# Patient Record
Sex: MALE | Race: WHITE | NOT HISPANIC OR LATINO | Employment: OTHER | URBAN - METROPOLITAN AREA
[De-identification: names, ages, dates, MRNs, and addresses within clinical notes are randomized per-mention and may not be internally consistent; named-entity substitution may affect disease eponyms.]

---

## 2019-04-22 ENCOUNTER — APPOINTMENT (OUTPATIENT)
Dept: CT IMAGING | Facility: CLINIC | Age: 59
DRG: 020 | End: 2019-04-22
Attending: PHYSICIAN ASSISTANT
Payer: COMMERCIAL

## 2019-04-22 ENCOUNTER — APPOINTMENT (OUTPATIENT)
Dept: CT IMAGING | Facility: CLINIC | Age: 59
DRG: 020 | End: 2019-04-22
Attending: EMERGENCY MEDICINE
Payer: COMMERCIAL

## 2019-04-22 ENCOUNTER — HOSPITAL ENCOUNTER (INPATIENT)
Facility: CLINIC | Age: 59
LOS: 7 days | Discharge: NURSING, REHAB OR LONG-TERM CARE | DRG: 020 | End: 2019-04-29
Attending: EMERGENCY MEDICINE | Admitting: INTERNAL MEDICINE
Payer: COMMERCIAL

## 2019-04-22 ENCOUNTER — APPOINTMENT (OUTPATIENT)
Dept: GENERAL RADIOLOGY | Facility: CLINIC | Age: 59
DRG: 020 | End: 2019-04-22
Attending: EMERGENCY MEDICINE
Payer: COMMERCIAL

## 2019-04-22 DIAGNOSIS — G89.29 CHRONIC BILATERAL LOW BACK PAIN WITHOUT SCIATICA: ICD-10-CM

## 2019-04-22 DIAGNOSIS — G40.909 SEIZURE DISORDER (H): ICD-10-CM

## 2019-04-22 DIAGNOSIS — R33.9 URINARY RETENTION: ICD-10-CM

## 2019-04-22 DIAGNOSIS — M54.50 CHRONIC BILATERAL LOW BACK PAIN WITHOUT SCIATICA: ICD-10-CM

## 2019-04-22 DIAGNOSIS — I62.9 INTRACRANIAL HEMORRHAGE (H): Primary | ICD-10-CM

## 2019-04-22 DIAGNOSIS — K59.00 CONSTIPATION, UNSPECIFIED CONSTIPATION TYPE: ICD-10-CM

## 2019-04-22 DIAGNOSIS — I61.9 LEFT-SIDED NONTRAUMATIC INTRACEREBRAL HEMORRHAGE, UNSPECIFIED CEREBRAL LOCATION (H): ICD-10-CM

## 2019-04-22 DIAGNOSIS — R68.2 DRY MOUTH: ICD-10-CM

## 2019-04-22 LAB
ALBUMIN SERPL-MCNC: 4.1 G/DL (ref 3.4–5)
ALBUMIN UR-MCNC: 30 MG/DL
ALP SERPL-CCNC: 101 U/L (ref 40–150)
ALT SERPL W P-5'-P-CCNC: 43 U/L (ref 0–70)
AMPHETAMINES UR QL SCN: NEGATIVE
ANION GAP SERPL CALCULATED.3IONS-SCNC: 24 MMOL/L (ref 3–14)
APPEARANCE UR: CLEAR
AST SERPL W P-5'-P-CCNC: 25 U/L (ref 0–45)
BARBITURATES UR QL: NEGATIVE
BASE DEFICIT BLDA-SCNC: 0 MMOL/L
BASOPHILS # BLD AUTO: 0.1 10E9/L (ref 0–0.2)
BASOPHILS NFR BLD AUTO: 0.6 %
BENZODIAZ UR QL: NEGATIVE
BILIRUB SERPL-MCNC: 0.4 MG/DL (ref 0.2–1.3)
BILIRUB UR QL STRIP: NEGATIVE
BUN SERPL-MCNC: 20 MG/DL (ref 7–30)
BURR CELLS BLD QL SMEAR: SLIGHT
CALCIUM SERPL-MCNC: 9.5 MG/DL (ref 8.5–10.1)
CANNABINOIDS UR QL SCN: NEGATIVE
CHLORIDE SERPL-SCNC: 106 MMOL/L (ref 94–109)
CO2 SERPL-SCNC: 14 MMOL/L (ref 20–32)
COCAINE UR QL: NEGATIVE
COLOR UR AUTO: YELLOW
CREAT SERPL-MCNC: 1.1 MG/DL (ref 0.66–1.25)
DIFFERENTIAL METHOD BLD: ABNORMAL
EOSINOPHIL # BLD AUTO: 0.6 10E9/L (ref 0–0.7)
EOSINOPHIL NFR BLD AUTO: 4.4 %
ERYTHROCYTE [DISTWIDTH] IN BLOOD BY AUTOMATED COUNT: 13.9 % (ref 10–15)
ETHANOL SERPL-MCNC: <0.01 G/DL
GFR SERPL CREATININE-BSD FRML MDRD: 73 ML/MIN/{1.73_M2}
GLUCOSE BLDC GLUCOMTR-MCNC: 142 MG/DL (ref 70–99)
GLUCOSE BLDC GLUCOMTR-MCNC: 151 MG/DL (ref 70–99)
GLUCOSE BLDC GLUCOMTR-MCNC: 95 MG/DL (ref 70–99)
GLUCOSE BLDC GLUCOMTR-MCNC: 98 MG/DL (ref 70–99)
GLUCOSE SERPL-MCNC: 178 MG/DL (ref 70–99)
GLUCOSE UR STRIP-MCNC: NEGATIVE MG/DL
GRAN CASTS #/AREA URNS LPF: 2 /LPF
HCO3 BLD-SCNC: 24 MMOL/L (ref 21–28)
HCT VFR BLD AUTO: 50.2 % (ref 40–53)
HGB BLD-MCNC: 16.8 G/DL (ref 13.3–17.7)
HGB UR QL STRIP: ABNORMAL
IMM GRANULOCYTES # BLD: 0.2 10E9/L (ref 0–0.4)
IMM GRANULOCYTES NFR BLD: 1.6 %
INR PPP: 1.17 (ref 0.86–1.14)
INTERPRETATION ECG - MUSE: NORMAL
KETONES UR STRIP-MCNC: 5 MG/DL
LACTATE BLD-SCNC: 1.6 MMOL/L (ref 0.7–2)
LEUKOCYTE ESTERASE UR QL STRIP: NEGATIVE
LYMPHOCYTES # BLD AUTO: 5.6 10E9/L (ref 0.8–5.3)
LYMPHOCYTES NFR BLD AUTO: 39.7 %
MCH RBC QN AUTO: 30.4 PG (ref 26.5–33)
MCHC RBC AUTO-ENTMCNC: 33.5 G/DL (ref 31.5–36.5)
MCV RBC AUTO: 91 FL (ref 78–100)
MONOCYTES # BLD AUTO: 1 10E9/L (ref 0–1.3)
MONOCYTES NFR BLD AUTO: 6.9 %
MUCOUS THREADS #/AREA URNS LPF: PRESENT /LPF
NEUTROPHILS # BLD AUTO: 6.6 10E9/L (ref 1.6–8.3)
NEUTROPHILS NFR BLD AUTO: 46.8 %
NITRATE UR QL: NEGATIVE
NRBC # BLD AUTO: 0.1 10*3/UL
NRBC BLD AUTO-RTO: 1 /100
O2/TOTAL GAS SETTING VFR VENT: ABNORMAL %
OPIATES UR QL SCN: NEGATIVE
OVALOCYTES BLD QL SMEAR: SLIGHT
PCO2 BLD: 38 MM HG (ref 35–45)
PCP UR QL SCN: NEGATIVE
PH BLD: 7.41 PH (ref 7.35–7.45)
PH UR STRIP: 6 PH (ref 5–7)
PLATELET # BLD AUTO: 191 10E9/L (ref 150–450)
PO2 BLD: 108 MM HG (ref 80–105)
POTASSIUM SERPL-SCNC: 3.8 MMOL/L (ref 3.4–5.3)
PROT SERPL-MCNC: 8.5 G/DL (ref 6.8–8.8)
RBC # BLD AUTO: 5.52 10E12/L (ref 4.4–5.9)
RBC #/AREA URNS AUTO: 78 /HPF (ref 0–2)
SALICYLATES SERPL-MCNC: <2 MG/DL
SODIUM SERPL-SCNC: 144 MMOL/L (ref 133–144)
SOURCE: ABNORMAL
SP GR UR STRIP: 1.02 (ref 1–1.03)
SQUAMOUS #/AREA URNS AUTO: <1 /HPF (ref 0–1)
UROBILINOGEN UR STRIP-MCNC: NORMAL MG/DL (ref 0–2)
WBC # BLD AUTO: 14.1 10E9/L (ref 4–11)
WBC #/AREA URNS AUTO: 1 /HPF (ref 0–5)

## 2019-04-22 PROCEDURE — 25000128 H RX IP 250 OP 636: Performed by: EMERGENCY MEDICINE

## 2019-04-22 PROCEDURE — 82803 BLOOD GASES ANY COMBINATION: CPT | Performed by: INTERNAL MEDICINE

## 2019-04-22 PROCEDURE — 99285 EMERGENCY DEPT VISIT HI MDM: CPT | Mod: 25

## 2019-04-22 PROCEDURE — 40000275 ZZH STATISTIC RCP TIME EA 10 MIN

## 2019-04-22 PROCEDURE — 25000128 H RX IP 250 OP 636

## 2019-04-22 PROCEDURE — 25800030 ZZH RX IP 258 OP 636: Performed by: INTERNAL MEDICINE

## 2019-04-22 PROCEDURE — 80320 DRUG SCREEN QUANTALCOHOLS: CPT | Performed by: EMERGENCY MEDICINE

## 2019-04-22 PROCEDURE — 99221 1ST HOSP IP/OBS SF/LOW 40: CPT | Performed by: PHYSICIAN ASSISTANT

## 2019-04-22 PROCEDURE — 71045 X-RAY EXAM CHEST 1 VIEW: CPT

## 2019-04-22 PROCEDURE — 85610 PROTHROMBIN TIME: CPT | Performed by: EMERGENCY MEDICINE

## 2019-04-22 PROCEDURE — 25000125 ZZHC RX 250: Performed by: INTERNAL MEDICINE

## 2019-04-22 PROCEDURE — 20000003 ZZH R&B ICU

## 2019-04-22 PROCEDURE — 96374 THER/PROPH/DIAG INJ IV PUSH: CPT

## 2019-04-22 PROCEDURE — 36415 COLL VENOUS BLD VENIPUNCTURE: CPT | Performed by: INTERNAL MEDICINE

## 2019-04-22 PROCEDURE — 36600 WITHDRAWAL OF ARTERIAL BLOOD: CPT

## 2019-04-22 PROCEDURE — 83605 ASSAY OF LACTIC ACID: CPT | Performed by: INTERNAL MEDICINE

## 2019-04-22 PROCEDURE — 70450 CT HEAD/BRAIN W/O DYE: CPT | Mod: 77

## 2019-04-22 PROCEDURE — 93005 ELECTROCARDIOGRAM TRACING: CPT

## 2019-04-22 PROCEDURE — 81001 URINALYSIS AUTO W/SCOPE: CPT | Performed by: EMERGENCY MEDICINE

## 2019-04-22 PROCEDURE — 25000125 ZZHC RX 250: Performed by: EMERGENCY MEDICINE

## 2019-04-22 PROCEDURE — 25000128 H RX IP 250 OP 636: Performed by: PHYSICIAN ASSISTANT

## 2019-04-22 PROCEDURE — 96375 TX/PRO/DX INJ NEW DRUG ADDON: CPT

## 2019-04-22 PROCEDURE — 99223 1ST HOSP IP/OBS HIGH 75: CPT | Performed by: PSYCHIATRY & NEUROLOGY

## 2019-04-22 PROCEDURE — 25800030 ZZH RX IP 258 OP 636: Performed by: EMERGENCY MEDICINE

## 2019-04-22 PROCEDURE — 80053 COMPREHEN METABOLIC PANEL: CPT | Performed by: EMERGENCY MEDICINE

## 2019-04-22 PROCEDURE — 80307 DRUG TEST PRSMV CHEM ANLYZR: CPT | Performed by: EMERGENCY MEDICINE

## 2019-04-22 PROCEDURE — 99223 1ST HOSP IP/OBS HIGH 75: CPT | Mod: AI | Performed by: INTERNAL MEDICINE

## 2019-04-22 PROCEDURE — 25000128 H RX IP 250 OP 636: Performed by: INTERNAL MEDICINE

## 2019-04-22 PROCEDURE — 00000146 ZZHCL STATISTIC GLUCOSE BY METER IP

## 2019-04-22 PROCEDURE — 80329 ANALGESICS NON-OPIOID 1 OR 2: CPT | Performed by: INTERNAL MEDICINE

## 2019-04-22 PROCEDURE — 85025 COMPLETE CBC W/AUTO DIFF WBC: CPT | Performed by: EMERGENCY MEDICINE

## 2019-04-22 PROCEDURE — 70498 CT ANGIOGRAPHY NECK: CPT | Mod: XS

## 2019-04-22 PROCEDURE — 70498 CT ANGIOGRAPHY NECK: CPT

## 2019-04-22 PROCEDURE — 70450 CT HEAD/BRAIN W/O DYE: CPT

## 2019-04-22 PROCEDURE — 96372 THER/PROPH/DIAG INJ SC/IM: CPT

## 2019-04-22 PROCEDURE — 25000125 ZZHC RX 250

## 2019-04-22 RX ORDER — NALOXONE HYDROCHLORIDE 0.4 MG/ML
.1-.4 INJECTION, SOLUTION INTRAMUSCULAR; INTRAVENOUS; SUBCUTANEOUS
Status: DISCONTINUED | OUTPATIENT
Start: 2019-04-22 | End: 2019-04-29 | Stop reason: HOSPADM

## 2019-04-22 RX ORDER — OLANZAPINE 10 MG/2ML
10 INJECTION, POWDER, FOR SOLUTION INTRAMUSCULAR DAILY PRN
Status: DISCONTINUED | OUTPATIENT
Start: 2019-04-22 | End: 2019-04-29 | Stop reason: HOSPADM

## 2019-04-22 RX ORDER — SODIUM CHLORIDE, SODIUM LACTATE, POTASSIUM CHLORIDE, CALCIUM CHLORIDE 600; 310; 30; 20 MG/100ML; MG/100ML; MG/100ML; MG/100ML
INJECTION, SOLUTION INTRAVENOUS CONTINUOUS
Status: DISCONTINUED | OUTPATIENT
Start: 2019-04-22 | End: 2019-04-25

## 2019-04-22 RX ORDER — PROCHLORPERAZINE 25 MG
25 SUPPOSITORY, RECTAL RECTAL EVERY 12 HOURS PRN
Status: DISCONTINUED | OUTPATIENT
Start: 2019-04-22 | End: 2019-04-29 | Stop reason: HOSPADM

## 2019-04-22 RX ORDER — AMLODIPINE BESYLATE 5 MG/1
5 TABLET ORAL DAILY
Status: ON HOLD | COMMUNITY
End: 2019-05-04

## 2019-04-22 RX ORDER — METOCLOPRAMIDE 10 MG/1
10 TABLET ORAL EVERY 6 HOURS PRN
Status: DISCONTINUED | OUTPATIENT
Start: 2019-04-22 | End: 2019-04-29 | Stop reason: HOSPADM

## 2019-04-22 RX ORDER — WATER 10 ML/10ML
INJECTION INTRAMUSCULAR; INTRAVENOUS; SUBCUTANEOUS
Status: COMPLETED
Start: 2019-04-22 | End: 2019-04-22

## 2019-04-22 RX ORDER — METFORMIN HCL 500 MG
500-1000 TABLET, EXTENDED RELEASE 24 HR ORAL
COMMUNITY

## 2019-04-22 RX ORDER — SODIUM CHLORIDE 9 MG/ML
1000 INJECTION, SOLUTION INTRAVENOUS CONTINUOUS
Status: DISCONTINUED | OUTPATIENT
Start: 2019-04-22 | End: 2019-04-23

## 2019-04-22 RX ORDER — OLANZAPINE 10 MG/2ML
INJECTION, POWDER, FOR SOLUTION INTRAMUSCULAR
Status: COMPLETED
Start: 2019-04-22 | End: 2019-04-22

## 2019-04-22 RX ORDER — LABETALOL 20 MG/4 ML (5 MG/ML) INTRAVENOUS SYRINGE
10-20 EVERY 10 MIN PRN
Status: DISCONTINUED | OUTPATIENT
Start: 2019-04-22 | End: 2019-04-29 | Stop reason: HOSPADM

## 2019-04-22 RX ORDER — ONDANSETRON 2 MG/ML
4 INJECTION INTRAMUSCULAR; INTRAVENOUS EVERY 6 HOURS PRN
Status: DISCONTINUED | OUTPATIENT
Start: 2019-04-22 | End: 2019-04-29 | Stop reason: HOSPADM

## 2019-04-22 RX ORDER — ONDANSETRON 4 MG/1
4 TABLET, ORALLY DISINTEGRATING ORAL EVERY 6 HOURS PRN
Status: DISCONTINUED | OUTPATIENT
Start: 2019-04-22 | End: 2019-04-29 | Stop reason: HOSPADM

## 2019-04-22 RX ORDER — LEVETIRACETAM 10 MG/ML
1000 INJECTION INTRAVASCULAR ONCE
Status: COMPLETED | OUTPATIENT
Start: 2019-04-22 | End: 2019-04-22

## 2019-04-22 RX ORDER — PROCHLORPERAZINE MALEATE 5 MG
10 TABLET ORAL EVERY 6 HOURS PRN
Status: DISCONTINUED | OUTPATIENT
Start: 2019-04-22 | End: 2019-04-29 | Stop reason: HOSPADM

## 2019-04-22 RX ORDER — METOCLOPRAMIDE HYDROCHLORIDE 5 MG/ML
10 INJECTION INTRAMUSCULAR; INTRAVENOUS EVERY 6 HOURS PRN
Status: DISCONTINUED | OUTPATIENT
Start: 2019-04-22 | End: 2019-04-29 | Stop reason: HOSPADM

## 2019-04-22 RX ORDER — MORPHINE SULFATE 4 MG/ML
INJECTION, SOLUTION INTRAMUSCULAR; INTRAVENOUS
Status: COMPLETED
Start: 2019-04-22 | End: 2019-04-22

## 2019-04-22 RX ORDER — HYDRALAZINE HYDROCHLORIDE 20 MG/ML
10-20 INJECTION INTRAMUSCULAR; INTRAVENOUS
Status: DISCONTINUED | OUTPATIENT
Start: 2019-04-22 | End: 2019-04-23

## 2019-04-22 RX ORDER — IOPAMIDOL 755 MG/ML
70 INJECTION, SOLUTION INTRAVASCULAR ONCE
Status: COMPLETED | OUTPATIENT
Start: 2019-04-22 | End: 2019-04-22

## 2019-04-22 RX ORDER — HYDROMORPHONE HYDROCHLORIDE 1 MG/ML
0.2 INJECTION, SOLUTION INTRAMUSCULAR; INTRAVENOUS; SUBCUTANEOUS
Status: DISCONTINUED | OUTPATIENT
Start: 2019-04-22 | End: 2019-04-23

## 2019-04-22 RX ORDER — LEVETIRACETAM 5 MG/ML
500 INJECTION INTRAVASCULAR EVERY 12 HOURS
Status: DISCONTINUED | OUTPATIENT
Start: 2019-04-22 | End: 2019-04-29

## 2019-04-22 RX ORDER — IOPAMIDOL 755 MG/ML
70 INJECTION, SOLUTION INTRAVASCULAR ONCE
Status: DISCONTINUED | OUTPATIENT
Start: 2019-04-22 | End: 2019-04-22

## 2019-04-22 RX ADMIN — WATER: 1 INJECTION INTRAMUSCULAR; INTRAVENOUS; SUBCUTANEOUS at 10:55

## 2019-04-22 RX ADMIN — Medication 0.2 MG: at 17:24

## 2019-04-22 RX ADMIN — MIDAZOLAM HYDROCHLORIDE 2 MG: 1 INJECTION, SOLUTION INTRAMUSCULAR; INTRAVENOUS at 11:09

## 2019-04-22 RX ADMIN — DEXMEDETOMIDINE 0.2 MCG/KG/HR: 100 INJECTION, SOLUTION, CONCENTRATE INTRAVENOUS at 12:02

## 2019-04-22 RX ADMIN — SODIUM CHLORIDE 1000 ML: 9 INJECTION, SOLUTION INTRAVENOUS at 11:37

## 2019-04-22 RX ADMIN — LEVETIRACETAM 500 MG: 5 INJECTION INTRAVENOUS at 21:21

## 2019-04-22 RX ADMIN — Medication 0.2 MG: at 13:50

## 2019-04-22 RX ADMIN — IOPAMIDOL 70 ML: 755 INJECTION, SOLUTION INTRAVENOUS at 18:16

## 2019-04-22 RX ADMIN — MIDAZOLAM HYDROCHLORIDE 2 MG: 1 INJECTION, SOLUTION INTRAMUSCULAR; INTRAVENOUS at 10:49

## 2019-04-22 RX ADMIN — SODIUM CHLORIDE 100 ML: 9 INJECTION, SOLUTION INTRAVENOUS at 18:16

## 2019-04-22 RX ADMIN — Medication 0.2 MG: at 20:20

## 2019-04-22 RX ADMIN — DEXMEDETOMIDINE 0.2 MCG/KG/HR: 100 INJECTION, SOLUTION, CONCENTRATE INTRAVENOUS at 16:35

## 2019-04-22 RX ADMIN — OLANZAPINE 10 MG: 10 INJECTION, POWDER, FOR SOLUTION INTRAMUSCULAR at 10:55

## 2019-04-22 RX ADMIN — LEVETIRACETAM 1000 MG: 10 INJECTION INTRAVENOUS at 12:01

## 2019-04-22 RX ADMIN — MORPHINE SULFATE 4 MG: 4 INJECTION INTRAVENOUS at 12:12

## 2019-04-22 RX ADMIN — SODIUM CHLORIDE, POTASSIUM CHLORIDE, SODIUM LACTATE AND CALCIUM CHLORIDE: 600; 310; 30; 20 INJECTION, SOLUTION INTRAVENOUS at 13:51

## 2019-04-22 RX ADMIN — Medication 0.2 MG: at 15:47

## 2019-04-22 ASSESSMENT — ENCOUNTER SYMPTOMS
CONFUSION: 1
HEADACHES: 0
AGITATION: 1
SHORTNESS OF BREATH: 0
ABDOMINAL PAIN: 0

## 2019-04-22 ASSESSMENT — ACTIVITIES OF DAILY LIVING (ADL)
RETIRED_EATING: 0-->INDEPENDENT
COGNITION: 0 - NO COGNITION ISSUES REPORTED
TRANSFERRING: 0-->INDEPENDENT
FALL_HISTORY_WITHIN_LAST_SIX_MONTHS: NO
BATHING: 0-->INDEPENDENT
AMBULATION: 0-->INDEPENDENT
ADLS_ACUITY_SCORE: 12
ADLS_ACUITY_SCORE: 12
SWALLOWING: 0-->SWALLOWS FOODS/LIQUIDS WITHOUT DIFFICULTY
DRESS: 0-->INDEPENDENT
RETIRED_COMMUNICATION: 0-->UNDERSTANDS/COMMUNICATES WITHOUT DIFFICULTY
TOILETING: 0-->INDEPENDENT

## 2019-04-22 NOTE — H&P
[facility]    History and Physical - Hospitalist Service       Date of Admission:  4/22/2019    Assessment & Plan   Apolinar Mcgregor is a 58 year old male with a history of HTN who presented to the ED with altered mental status and was found to have an acute parenchymal hemorrhage     Acute parenchymal hemorrhage  AMS due to above  Patient initially presented with AMS.  Found to have an acute parenchymal hemorrhage in the left parietal and occipital lobs measuring up to 4.7 cm with 4 mm rightward midline shift.  Wife does not know of any traumatic head injuries.  Does have a history of HTN but blood pressure well controlled on presentation.  UDS negative.  ED did speak with neurosurgery who recommended admission to ICU and continued monitoring  - Admission to ICU w/q1h neuro-checks  - Goal SBP <140 with PRN Labetalol and Hydralazine  - Repeat CT head  - NPO currently  - PT/OT/Speech consulted   - Neurosurgery and neurology consulted.  Appreciate their assistance.  No plans for surgical intervention at this time     Anion gap metabolic acidosis   Bicarb 14 and anion gap 24.  Unclear etiology at this time.  Given the bleed it is possible the patient had a seizure and this could have elevated his lactate.   - Lactic acid ordered  - ASA level   - Repeat BMP in AM     H/o HTN   Blood pressures well controlled at this time without intervention  - Blood pressure control as above        Diet: NPO for Medical/Clinical Reasons Except for: Other; Specify: Until Dysphagia/Stroke Swallow Screen Completed (except Status Epileptics patients)    DVT Prophylaxis: Pneumatic Compression Devices  Hinson Catheter: in place, indication:    Code Status: Full Code      Disposition Plan   Expected discharge: 2-5 days, recommended to transitional care unit once work up complete.  Entered: Bijan Hernandez DO 04/22/2019, 12:58 PM     The patient's care was discussed with the Patient, Patient's Family and ED physician  .    Bijan Hernandez,   [facility]    ______________________________________________________________________    Chief Complaint   Altered mental status    History is limited due to his altered mental status.  History was obtained from the patient's friend and wife     History of Present Illness   Apolinar Mcgregor is a 58 year old male with a history of HTN who presented to the ED with AMS. The patient's friend reports that this morning the patient called him and he felt he sounded confused so he went to  the patient to bring him to an urgent care.  By the time he arrived at the patient's house he felt as if the patient's confusion had worsened and he was now getting agitated so he brought him in to the ED instead.  On presentation the patient was given Versed x2 and placed in 5 point restraints and has since calmed down.  Patient is intermittently stating his back hurts but otherwise not answering questions appropriately or following commands.  Wife states that yesterday the patient was his normal self and had no complaints.  She also does not know of any injuries to his head.      In the ED the patient was seen by Dr. Covarrubias.  CT head showed the parenchymal hemorrhage.  Neurosurgery was consulted who did not feel surgical intervention was warranted at this time.  He was given Versed x2 and placed in 5 point restraints for agitation.  Admitted to ICU for further evaluation     Review of Systems    Unable to obtain due to the patient's altered mental status     Past Medical History    I have reviewed this patient's medical history and updated it with pertinent information if needed.   Past Medical History:   Diagnosis Date     Gastro-oesophageal reflux disease      Hypertension      Urinary retention        Past Surgical History   I have reviewed this patient's surgical history and updated it with pertinent information if needed.  Past Surgical History:   Procedure Laterality Date      CHOLECYSTECTOMY  2000     URETHROPLASTY WITH BUCCAL GRAFT  6/12/2013    Procedure: URETHROPLASTY WITH BUCCAL GRAFT;  Anterior Urethroplasty with Buccal Graft ;  Surgeon: Dmitry Moore MD;  Location: [facility] OR       Social History   I have reviewed this patient's social history and updated it with pertinent information if needed.  Social History     Tobacco Use     Smoking status: Former Smoker     Smokeless tobacco: Never Used   Substance Use Topics     Alcohol use: Yes     Comment: occasional     Drug use: No       Family History   I have reviewed this patient's family history and updated it with pertinent information if needed.   Mother- Lung cancer     Prior to Admission Medications   Prior to Admission Medications   Prescriptions Last Dose Informant Patient Reported? Taking?   Omeprazole (PRILOSEC PO) 4/21/2019 at Unknown time Self Yes Yes   Sig: Take 20 mg by mouth every morning.   amLODIPine (NORVASC) 5 MG tablet 4/21/2019 at Unknown time Pharmacy Yes Yes   Sig: Take 5 mg by mouth daily   metFORMIN (GLUCOPHAGE-XR) 500 MG 24 hr tablet 4/21/2019 at Unknown time Pharmacy Yes Yes   Sig: Take 500-1,000 mg by mouth daily (with breakfast)      Facility-Administered Medications: None     Allergies   No Known Allergies    Physical Exam   Vital Signs: Temp: 98.4  F (36.9  C) Temp src: Axillary BP: 103/69 Pulse: 101 Heart Rate: 96 Resp: 23 SpO2: 96 % O2 Device: Nasal cannula Oxygen Delivery: 3 LPM  Weight: 211 lbs 13.79 oz    General Appearance: Resting in bed.  In 5 point restraints  Eyes: Eyes shut but will occasionally look in the direction of speaker.  Normal conjunctiva  HEENT: NC/AT  Respiratory: Clear to auscultation.  No respiratory distress  Cardiovascular: Tachycardia.  No murmurs  GI: Bowel sounds present.  Non-distended  Skin: No obvious rashes  Musculoskeletal: No edema  Neurologic: Moving all extremities grossly but unable to follow commands  Psychiatric: Pleasant and alert    Data   Data reviewed  today: I reviewed all medications, new labs and imaging results over the last 24 hours. I personally reviewed   CT Head:  Left parenchymal hemorrhage seen     Recent Labs   Lab 04/22/19  1050   WBC 14.1*   HGB 16.8   MCV 91      INR 1.17*      POTASSIUM 3.8   CHLORIDE 106   CO2 14*   BUN 20   CR 1.10   ANIONGAP 24*   ALAN 9.5   *   ALBUMIN 4.1   PROTTOTAL 8.5   BILITOTAL 0.4   ALKPHOS 101   ALT 43   AST 25     Recent Results (from the past 24 hour(s))   XR Chest 1 View    Narrative    CHEST ONE VIEW SEMI-UPRIGHT 4/22/2019 11:25 AM     HISTORY: Altered level of consciousness.    COMPARISON: March 10, 2013      Impression    IMPRESSION: Low lung volumes. No definite acute infiltrates.    ALBERT MON MD   CT Head w/o Contrast    Narrative    CT SCAN OF THE HEAD WITHOUT CONTRAST   4/22/2019 11:39 AM     HISTORY: Acute confusion. Unresponsive.    TECHNIQUE:  Axial images of the head and coronal reformations without  IV contrast material. Radiation dose for this scan was reduced using  automated exposure control, adjustment of the mA and/or kV according  to patient size, or iterative reconstruction technique.    COMPARISON: None.    FINDINGS: There is an acute hyperdense parenchymal hemorrhage in the  left parietal and occipital lobes measuring 3.9 x 1.9 x 4.7 cm.  Moderate surrounding hypodensity likely representing vasogenic edema.  There is mild mass effect on the posterior horn of the left lateral  ventricle without significant dilatation of the ventricles or evidence  of hydrocephalus. There is 4 mm of rightward midline shift.    Mild scattered mucosal thickening in the paranasal sinuses. The bony  calvarium and bones of the skull base appear intact.       Impression    IMPRESSION:    1. Acute parenchymal hemorrhage in the left parietal and occipital  lobes measuring up to 4.7 cm as detailed above. Moderate surrounding  hypodense edema with mass effect on the posterior horn of the left  lateral  ventricle. No evidence of hydrocephalus.   2. 4 mm rightward midline shift.    Results discussed with Tiago Covarrubias at 11:44 AM on 4/22/2019.    GIA STRATTON MD

## 2019-04-22 NOTE — CONSULTS
"[facility]      Stroke Consult Note    Reason for Consult:  Non-emergent consult request for \"Intracranial hemorrhage\"    HPI  Apolinar Mcgregor is a 58 year old man with a history of hypertension who was in his usual state of health until this morning when a friend called him and found him to be altered, then went to his house and found him very altered, drove him to urgent care but he deteriorated and was \"flailing around\" in the car so went to the ED instead. On arrival to the ED he was still extremely agitated and restrained and given versed.  On my exam in the patient's ICU room he complained of no headache and agitation was improving a little per his family. He did complain of back pain. He has no prior neurologic history and is no no blood thinners. BP on arrival to the ED was only in the 120s.    Impression  Intracerebral Hemorrhage:   L parietal and occipital intraparenchymal hemorrhage, ICH score 0, 17cc, bleed day #1  Cerebral edema  Seizure on presentation - has R tongue bite  Agitation due to postictal state  Back pain due to seizure  Anion gap metabolic acidosis - due to seizure    Recommendations  Acute Hemorrhagic Stroke Recommendations  - Neurochecks q1 hr  - Recheck head CT 6 hrs from first scan (will be 1715 tonight). Add CTA/CTV head at that time if calm.  - Head CT also in the morning  - Agree with checking lactate level, expect will be elevated  - Seizure prophylaxis with Keppra 500mg q12 hours for at least a month. Can switch to po when taking po consistently.  - Systolic BP Goal: < 140  - Maintain normoglycemia, normothermia, normonatremia  - Head of bed elevated at least 30 degrees  - PT/OT/SLP  - Stroke Education    Patient Follow-up    - final recommendation pending work-up      Please contact the Stroke Service with any questions. Will follow.    Shayna Koch PA-C  Neurology  04/22/2019 1:07 PM  Pager: [phone]    Text Page " ([phone])  __________________________________________________    Past Medical History:   Diagnosis Date     Gastro-oesophageal reflux disease      Hypertension      Urinary retention        Past Surgical History:   Procedure Laterality Date     CHOLECYSTECTOMY  2000     URETHROPLASTY WITH BUCCAL GRAFT  6/12/2013    Procedure: URETHROPLASTY WITH BUCCAL GRAFT;  Anterior Urethroplasty with Buccal Graft ;  Surgeon: Dmitry Moore MD;  Location: [facility] OR       Medications   Home Meds  Medication Sig   amLODIPine (NORVASC) 5 MG tablet Take 5 mg by mouth daily   metFORMIN (GLUCOPHAGE-XR) 500 MG 24 hr tablet Take 500-1,000 mg by mouth daily (with breakfast)   Omeprazole (PRILOSEC PO) Take 20 mg by mouth every morning.       Scheduled meds    iopamidol  70 mL Intravenous Once     levETIRAcetam  500 mg Intravenous Q12H     sodium chloride 0.9 %  90 mL Intravenous Once       Infusion meds    dexmedetomidine Stopped (04/22/19 1213)     - MEDICATION INSTRUCTIONS -       sodium chloride         PRN meds  sodium chloride 0.9%, hydrALAZINE, labetalol, - MEDICATION INSTRUCTIONS -, metoclopramide **OR** metoclopramide, naloxone, OLANZapine, ondansetron **OR** ondansetron, prochlorperazine **OR** prochlorperazine **OR** prochlorperazine      Allergies   No Known Allergies    Family History       Social History      Social History     Tobacco Use     Smoking status: Former Smoker     Smokeless tobacco: Never Used   Substance Use Topics     Alcohol use: Yes     Comment: occasional     Drug use: No         ROS  Review of systems not obtained due to patient factors - lack of cooperation    PHYSICAL EXAMINATION  Temp:  [98.4  F (36.9  C)] 98.4  F (36.9  C)  Pulse:  [] 89  Heart Rate:  [] 90  Resp:  [14-30] 14  BP: ()/() 128/82  SpO2:  [94 %-99 %] 94 %    General: Lying in bed in NAD, gets very restless when stimulated  Heent: Has R side tongue bite consistent with seizure    Neurologic  Mental Status:  follows 1  step commands, does not answer orientation questions  Cranial Nerves:  PERRL, facial movements symmetric, hearing not formally tested but intact to conversation, shoulder shrug strong bilaterally  Motor:  participates very poorly, may have a mild R arm hemiparesis, left seems strong and legs seem strong  Reflexes:  deferred  Sensory:  unable to test as he does not participate  Coordination:  unable to test as he does not participate  Station/Gait:  deferred due to acute illness    Stroke Scales      ICH Score (at admission)  Scoring Tool Score   Age ? 80 years 1 point No   GCS score  3-4   5-12   13-15   2 point  1 point  0 point GCS 13-15   Hematoma volume, cm 3 ? 30 1 point No   Intraventricular extension 1 point No   Infratentorial location 1 point No   Total 0         Labs/Imaging  Labs and imaging were reviewed and used in developing plan; pertinent results included.  Data   CBC  WBC (10e9/L)   Date Value   04/22/2019 14.1 (H)   04/30/2013 6.6   03/10/2013 7.9    RBC Count (10e12/L)   Date Value   04/22/2019 5.52   04/30/2013 5.28   03/10/2013 5.62    Hemoglobin (g/dL)   Date Value   04/22/2019 16.8   04/30/2013 16.8   03/10/2013 17.8 (H)      Hematocrit (%)   Date Value   04/22/2019 50.2   04/30/2013 47.3   03/10/2013 50.2    Platelet Count (10e9/L)   Date Value   04/22/2019 191   04/30/2013 169   03/10/2013 168         BMP  Sodium (mmol/L)   Date Value   04/22/2019 144   04/30/2013 143   03/10/2013 141    Potassium (mmol/L)   Date Value   04/22/2019 3.8   06/12/2013 4.1   04/30/2013 4.0    Chloride (mmol/L)   Date Value   04/22/2019 106   04/30/2013 104   03/10/2013 104      Carbon Dioxide (mmol/L)   Date Value   04/22/2019 14 (L)   04/30/2013 24   03/10/2013 25    Glucose (mg/dL)   Date Value   04/22/2019 178 (H)   04/30/2013 103 (H)   03/10/2013 109 (H)    Urea Nitrogen (mg/dL)   Date Value   04/22/2019 20   04/30/2013 15   03/10/2013 17      Creatinine (mg/dL)   Date Value   04/22/2019 1.10   04/30/2013  0.89   03/10/2013 0.96    Calcium (mg/dL)   Date Value   04/22/2019 9.5   04/30/2013 9.4   03/10/2013 9.9         INR Troponin I A1C   INR (no units)   Date Value   04/22/2019 1.17 (H)   04/30/2013 1.01    Troponin I ES (ug/L)   Date Value   03/11/2013 <0.012   03/10/2013 <0.012   03/10/2013 <0.012    No results found for: A1C     Liver Panel  Protein Total (g/dL)   Date Value   04/22/2019 8.5   05/11/2008 7.7    Albumin (g/dL)   Date Value   04/22/2019 4.1   05/11/2008 4.5    Bilirubin Total (mg/dL)   Date Value   04/22/2019 0.4   05/11/2008 0.5      Alkaline Phosphatase (U/L)   Date Value   04/22/2019 101   05/11/2008 79    AST (U/L)   Date Value   04/22/2019 25   05/11/2008 40    ALT (U/L)   Date Value   04/22/2019 43   05/11/2008 60      No results found for: BILIDIRECT       Lipid Profile  No results found for: CHOL No results found for: HDL No results found for: LDL   No results found for: TRIG No results found for: CHOLHDLRATIO           I have personally spent a total of 50 minutes providing care and consulting with this patient's medical providers today, with more than 50% of this time spent in consultation, coordination of care, and discussion with the patient and/or family regarding diagnostic results, prognosis, symptom management, risks and benefits of management options, and development of plan of care.     This was a non-emergent, non-tele stroke consult.

## 2019-04-22 NOTE — CONSULTS
[facility]    Neurosurgery Consultation     Date of Admission:  4/22/2019  Date of Consult (When I saw the patient): 04/22/19    Assessment & Plan   Apolinar Mcgregor is a 58 year old male who was admitted on 4/22/2019. I was asked to see the patient for acute parenchymal hemorrhage.    Active Problems:  Parenchymal hemorrhage  Assessment: acute parenchymal hemorrhage in left parietal and occipital lobes measuring 4.7cm with moderate surrounding edema and mass effect with 4mm midline shift. Initially unable to obtain CTA due to patient agitation, but much calmer now.  Plan:  - CTA  -  Keep SBP < 160  -  Elevate HOB 30-60 degrees  -  Discontinue all blood thinners and anti-inflammatories      I have discussed the following assessment and plan with Dr. Aashish Banegas who is in agreement with initial plan and will follow up with further consultation recommendations.    Stephany Islas PA-C  [facility]  [address]  [address] [place] [id number]    Tel [phone]  Pager [phone]        Code Status    Prior    Reason for Consult   Reason for consult: I was asked by Dr. Covarrubias to evaluate this patient for parenchymal hemorrhage.    Primary Care Physician   Luis Fernando Rodriguez    Chief Complaint   AMS    History is obtained from the patient, electronic health record, emergency department physician and patient's family    History of Present Illness   Apolinar Mcgregor is a 58 year old male who presents with AMS. Was contacted by friend this AM to ask him out for drinks later, but patient did not answer his phone. He called back 30 minutes later and patient sounded odd, so he presented to house and found patient thrashing and yelling not making sense. Was brought to ED and found to have left parenchymal hemorrhage. Per family at bedside patient was feeling well yesterday and does not remember him having any falls or trauma. Was initially agitated and restless on  arrival, but is much calmer on exam at current. Unable to get CTA earlier, but will need to obtain CTA at this time. Patient is aware he is in hospital ([facility]), current year, and his . Denies headache.    Past Medical History   I have reviewed this patient's medical history and updated it with pertinent information if needed.   Past Medical History:   Diagnosis Date     Gastro-oesophageal reflux disease      Hypertension      Urinary retention        Past Surgical History   I have reviewed this patient's surgical history and updated it with pertinent information if needed.  Past Surgical History:   Procedure Laterality Date     CHOLECYSTECTOMY       URETHROPLASTY WITH BUCCAL GRAFT  2013    Procedure: URETHROPLASTY WITH BUCCAL GRAFT;  Anterior Urethroplasty with Buccal Graft ;  Surgeon: Dmitry Moore MD;  Location: [facility] OR       Prior to Admission Medications   Prior to Admission Medications   Prescriptions Last Dose Informant Patient Reported? Taking?   Omeprazole (PRILOSEC PO)  Self Yes No   Sig: Take 20 mg by mouth every morning.   UNKNOWN TO PATIENT   Yes No   Sig: Blood pressure medication      Facility-Administered Medications: None     Allergies   No Known Allergies    Social History   I have reviewed this patient's social history and updated it with pertinent information if needed. Apolinar HECTOR Mcgregor  reports that he has quit smoking. He has never used smokeless tobacco. He reports that he drinks alcohol. He reports that he does not use drugs.    Family History   I have reviewed this patient's family history and updated it with pertinent information if needed.   No family history on file.    Review of Systems   CONSTITUTIONAL: NEGATIVE for fever, chills, change in weight  INTEGUMENTARY/SKIN: NEGATIVE for worrisome rashes, moles or lesions  EYES: NEGATIVE for vision changes or irritation  ENT/MOUTH: NEGATIVE for ear, mouth and throat problems  RESP: NEGATIVE for  significant cough or SOB  BREAST: NEGATIVE for masses, tenderness or discharge  CV: NEGATIVE for chest pain, palpitations or peripheral edema  GI: NEGATIVE for nausea, abdominal pain, heartburn, or change in bowel habits  : NEGATIVE for frequency, dysuria, or hematuria  MUSCULOSKELETAL: NEGATIVE for significant arthralgias or myalgia  NEURO: NEGATIVE for weakness, dizziness or paresthesias  ENDOCRINE: NEGATIVE for temperature intolerance, skin/hair changes  HEME: NEGATIVE for bleeding problems  PSYCHIATRIC: NEGATIVE for changes in mood or affect    Physical Exam   Temp: 98.4  F (36.9  C) Temp src: Oral BP: 118/70 Pulse: 101 Heart Rate: 103 Resp: 25 SpO2: 96 % O2 Device: Nasal cannula Oxygen Delivery: 3 LPM  Vital Signs with Ranges  Temp:  [98.4  F (36.9  C)] 98.4  F (36.9  C)  Pulse:  [101-140] 101  Heart Rate:  [101-138] 103  Resp:  [18-30] 25  BP: ()/() 118/70  SpO2:  [96 %-99 %] 96 %  212 lbs 0 oz    Heart Rate: 103, Blood pressure 118/70, pulse 101, temperature 98.4  F (36.9  C), temperature source Oral, resp. rate 25, weight 212 lb (96.2 kg), SpO2 96 %.  212 lbs 0 oz  HEENT:  Normocephalic, atraumatic.  PERRL.  EOM s intact.   Neck:  Supple, non-tender, without lymphadenopathy.  Heart:  No peripheral edema  Lungs:  No SOB  Abdomen:  Soft, non-tender, non-distended.    Skin:  Warm and dry.  Extremities:  Good radial and dorsalis pedis pulses bilaterally, no edema, cyanosis or clubbing.    NEUROLOGICAL EXAMINATION:   Mental status:  Alert and Oriented x 3, speech is fluent. Some intermittent confusion.  Cranial nerves:  II-XII intact.   Motor: Moves all extremities equally, in restraints  Sensation:  intact  Reflexes:   Negative Babinski.  Negative Clonus.    Gait:  Deferred      Data   CBC RESULTS:   Recent Labs   Lab Test 04/22/19  1050   WBC 14.1*   RBC 5.52   HGB 16.8   HCT 50.2   MCV 91   MCH 30.4   MCHC 33.5   RDW 13.9        Basic Metabolic Panel:  Lab Results   Component Value Date      04/22/2019      Lab Results   Component Value Date    POTASSIUM 3.8 04/22/2019     Lab Results   Component Value Date    CHLORIDE 106 04/22/2019     Lab Results   Component Value Date    ALAN 9.5 04/22/2019     Lab Results   Component Value Date    CO2 14 04/22/2019     Lab Results   Component Value Date    BUN 20 04/22/2019     Lab Results   Component Value Date    CR 1.10 04/22/2019     Lab Results   Component Value Date     04/22/2019     INR:  Lab Results   Component Value Date    INR 1.17 04/22/2019    INR 1.01 04/30/2013

## 2019-04-22 NOTE — PHARMACY-ADMISSION MEDICATION HISTORY
Admission medication history interview status for the 4/22/2019  admission is complete. See EPIC admission navigator for prior to admission medications     Medication history source reliability:Good    Actions taken by pharmacist (provider contacted, etc):Called [facility] for doses of meds because pt and wife didn't know.     Additional medication history information not noted on PTA med list :None    Medication reconciliation/reorder completed by provider prior to medication history? No    Time spent in this activity: 10 min    Prior to Admission medications    Medication Sig Last Dose Taking? Auth Provider   amLODIPine (NORVASC) 5 MG tablet Take 5 mg by mouth daily 4/21/2019 at Unknown time Yes Unknown, Entered By History   metFORMIN (GLUCOPHAGE-XR) 500 MG 24 hr tablet Take 500-1,000 mg by mouth daily (with breakfast) 4/21/2019 at Unknown time Yes Unknown, Entered By History   Omeprazole (PRILOSEC PO) Take 20 mg by mouth every morning. 4/21/2019 at Unknown time Yes Reported, Patient

## 2019-04-22 NOTE — PLAN OF CARE
SLP: Orders received and chart reviewed. Patient admitted with a left parietal and occipital bleed. Per his nurse hold off on evaluation today. Will reschedule for 4/23/19.

## 2019-04-22 NOTE — ED PROVIDER NOTES
"  History     Chief Complaint:  Altered mental status     HPI   Apolinar Mcgregor is a 58 year old male with a history of diabetes mellitus, hypertension, and gastro esopageal reflux disease who presents with altered mental status. The patient was on the phone with a friend to schedule plans to meet up this evening when he mentioned not feeling right and sounding confused. The patient's friend visited him and found him confused and screaming nonsensical statements. He called 911. Due to concern, the patient has been taken to the ED for evaluation and treatment via ambulance. Upon arrival, the patient denies any headache, chest pain, shortness of breath, or abdominal pain. The patient is not a heavy drinker according to his friend.     Allergies:  The patient has no known drug allergies.    Medications:    Omeprazole     Past Medical History:    Gastro esopageal reflux disease  Hypertension  Urinary retention  Urethral stricture     Past Surgical History:    cholecystectomy   urethroplasty with buccal graft     Family History:    No past pertinent family history.    Social History:  Marital Status:     Smoker:   Former   Smokeless:   Never   Alcohol:   Yes   Drugs:   No   Arrives with:   Friend      Review of Systems   Unable to perform ROS: Mental status change   Respiratory: Negative for shortness of breath.    Cardiovascular: Negative for chest pain.   Gastrointestinal: Negative for abdominal pain.   Neurological: Negative for headaches.   Psychiatric/Behavioral: Positive for agitation and confusion.     Physical Exam     Patient Vitals for the past 24 hrs:   BP Temp Temp src Pulse Heart Rate Resp SpO2 Height Weight   04/22/19 1500 123/81 -- -- 79 80 18 97 % -- --   04/22/19 1430 99/65 -- -- 84 85 18 97 % -- --   04/22/19 1400 116/68 -- -- 93 99 17 100 % -- --   04/22/19 1330 122/81 -- -- 87 82 14 93 % -- --   04/22/19 1300 128/82 -- -- 89 90 14 94 % 1.778 m (5' 10\") --   04/22/19 1245 103/69 98.4  F (36.9  C) " Axillary -- 96 23 96 % -- 96.1 kg (211 lb 13.8 oz)   04/22/19 1210 118/70 -- -- 101 103 25 96 % -- --   04/22/19 1200 126/72 -- -- 104 101 22 97 % -- --   04/22/19 1150 108/65 -- -- 105 106 24 96 % -- --   04/22/19 1140 101/68 -- -- 109 107 21 99 % -- 96.2 kg (212 lb)   04/22/19 1137 -- 98.4  F (36.9  C) Oral -- -- -- -- -- --   04/22/19 1135 108/69 -- -- 121 116 18 99 % -- --   04/22/19 1105 99/61 -- -- 125 125 30 -- -- --   04/22/19 1100 103/67 -- -- 129 130 21 97 % -- --   04/22/19 1055 117/71 -- -- 134 135 21 -- -- --   04/22/19 1050 (!) 120/111 -- -- 140 138 26 -- -- --     Physical Exam  Constitutional: Middle aged white male thrashing about. Intermittently yelling out.  HENT: No signs of trauma.   Eyes: Unable to cooperate with EOM. Pupils are equal, round, and reactive to light.   Neck: Normal range of motion. No JVD present. No cervical adenopathy.  Cardiovascular: Regular rhythm.  Exam reveals no gallop and no friction rub.    No murmur heard.  Pulmonary/Chest: Bilateral breath sounds normal. No wheezes, rhonchi or rales.  Abdominal: Soft. No tenderness. No rebound or guarding.   Musculoskeletal: No edema. No tenderness.   Lymphadenopathy: No lymphadenopathy.   Neurological: Awake, alert, able to state his name. Normal strength. Coordination normal. Does not answer questions about time or place. No facial asymmetry. Moves all extremities well.  Skin: Skin is warm and dry. No rash noted. No erythema.   Psych: Yelling out, confused, agitated. No overt psychosis.     Emergency Department Course   ECG:  Indication: altered mental status   Time: 1054  Vent. Rate 136 bpm. VA interval 134. QRS duration 94. QT/QTc 306/460. P-R-T axis 45 -33 40. Sinus tachycardia. Left axis deviation. Voltage criteria for left ventricular hypertrophy. Read time: 1055    Imaging:  Radiographic findings were communicated with the patient and family who voiced understanding of the findings.    XR Chest 1 view:   Low lung volumes. No  definite acute infiltrates. as per radiology.     CT Head without contrast:   1. Acute parenchymal hemorrhage in the left parietal and occipital  lobes measuring up to 4.7 cm as detailed above. Moderate surrounding  hypodense edema with mass effect on the posterior horn of the left  lateral ventricle. No evidence of hydrocephalus.   2. 4 mm rightward midline shift. as per radiology.    Laboratory:  INR: 1.17  Glucose by meter: 151  CMP: Glucose 178, carbon dioxide 14, anion gap 24, o/w WNL (Creatinine: 1.10)  Alcohol ethyl: <0.01  CBC: WBC: 14.1, HGB: 16.8, PLT: 191  UA with Microscopic: ketones 5, blood moderate, albumin 30, RBC 78, mucous present, granular casts 2, o/w WNL  Drug abuse screen: negative     Interventions:  1055: Zyprexa 10 mg IM  1109: Versed 2 mg IV  1137: NS 1L IV Bolus  1201: Keppra 1000 mg IV infusion  1212: Morphine 4 mg IV  1212: Versed 2 mg IV    Emergency Department Course:  (1040) I performed an exam of the patient as documented above.   (1042)  The patient began yelling and fighting against staff.  (1043)  The patient held down by multiple staff.  (1044)  The patient restrained.  (1044)  IV attempted in the right arm. Unsuccessful.   (1046)  Zyprexa given IM  (1048)  The patient's lungs auscultated.   (1049)  IV attempted in left arm. Successful.   (1130)  Head bleed recognized.  (1131)  Visited CT to observe scan.  (1141) I consulted with radiology.  (1144) I consulted with Cristobal Islas, neurosurgery PA, regarding the patient's history and presentation here in the emergency department.  (1151)  I consulted with Dr. Hernandez of the hospitalist services. They are in agreement to accept the patient for admission.  (1154) I consulted with Dr. Tavarez, , regarding the patient's history and presentation here in the emergency department.  (1158) I rechecked the patient and discussed the results of their workup thus far.   Findings and plan explained to the Patient and spouse and friend who consents  to admission. Discussed the patient with Dr. Hernandez, who will admit the patient to a ICU bed for further monitoring, evaluation, and treatment.    Impression & Plan    Medical Decision Making:  Apolinar Mcgregor is a 58 years old male that presents to the ED with his friend. The patient's friend called him at about 0930 this morning to try to set up a time to go out tonight. The patient stated he was feeling confused and not right. His friend came and noticed that he was very agitated and brought him to the ED. The patient's wife had gone to work an hour earlier. She states he had been sleeping when she left and did not notice anything different either this morning or last night. There is no reported trauma. The patient on arrival here was very agitated. He had to be sedated and restrained. I ordered a CT which did show a left occipital bleed in his cerebral. Labs were obtained without remarkable findings with IV Versed, IM Zyprexa, and Precedex drip available, the patient is more stable. We did attempt originally to get a CT but he became more agitated when he was moved around. I discussed the patient with neurosurgery, neuro stroke team, and a hospitalist and the patient will be admitted to the ICU for further evaluation and treatment.     Critical Care time:  was 35 minutes for this patient excluding procedures.    Diagnosis:    ICD-10-CM    1. Left-sided nontraumatic intracerebral hemorrhage, unspecified cerebral location (H) I61.9 CBC with platelets differential     Blood gas arterial     Lactic acid whole blood     Salicylate level     Glucose by meter     Glucose by meter     Disposition:  Admitted to ICU    Scribe Disclosure:  I, Godfrey Arceo, am serving as a scribe on 4/22/2019 at 10:40 PM to personally document services performed by Bijan Hernandez,* based on my observations and the provider's statements to me.     Godfrey Arceo  4/22/2019    EMERGENCY DEPARTMENT       Tiago Covarrubias,  MD  04/22/19 8247

## 2019-04-23 ENCOUNTER — APPOINTMENT (OUTPATIENT)
Dept: MRI IMAGING | Facility: CLINIC | Age: 59
DRG: 020 | End: 2019-04-23
Attending: PHYSICIAN ASSISTANT
Payer: COMMERCIAL

## 2019-04-23 ENCOUNTER — APPOINTMENT (OUTPATIENT)
Dept: SPEECH THERAPY | Facility: CLINIC | Age: 59
DRG: 020 | End: 2019-04-23
Payer: COMMERCIAL

## 2019-04-23 LAB
ANION GAP SERPL CALCULATED.3IONS-SCNC: 5 MMOL/L (ref 3–14)
BUN SERPL-MCNC: 11 MG/DL (ref 7–30)
CALCIUM SERPL-MCNC: 8.8 MG/DL (ref 8.5–10.1)
CHLORIDE SERPL-SCNC: 111 MMOL/L (ref 94–109)
CO2 SERPL-SCNC: 28 MMOL/L (ref 20–32)
CREAT SERPL-MCNC: 0.8 MG/DL (ref 0.66–1.25)
ERYTHROCYTE [DISTWIDTH] IN BLOOD BY AUTOMATED COUNT: 14.1 % (ref 10–15)
GFR SERPL CREATININE-BSD FRML MDRD: >90 ML/MIN/{1.73_M2}
GLUCOSE BLDC GLUCOMTR-MCNC: 107 MG/DL (ref 70–99)
GLUCOSE BLDC GLUCOMTR-MCNC: 114 MG/DL (ref 70–99)
GLUCOSE BLDC GLUCOMTR-MCNC: 135 MG/DL (ref 70–99)
GLUCOSE SERPL-MCNC: 117 MG/DL (ref 70–99)
HCT VFR BLD AUTO: 42.8 % (ref 40–53)
HGB BLD-MCNC: 15.1 G/DL (ref 13.3–17.7)
MCH RBC QN AUTO: 30.6 PG (ref 26.5–33)
MCHC RBC AUTO-ENTMCNC: 35.3 G/DL (ref 31.5–36.5)
MCV RBC AUTO: 87 FL (ref 78–100)
PLATELET # BLD AUTO: 140 10E9/L (ref 150–450)
POTASSIUM SERPL-SCNC: 3.8 MMOL/L (ref 3.4–5.3)
RBC # BLD AUTO: 4.94 10E12/L (ref 4.4–5.9)
SODIUM SERPL-SCNC: 144 MMOL/L (ref 133–144)
WBC # BLD AUTO: 7 10E9/L (ref 4–11)

## 2019-04-23 PROCEDURE — 25000128 H RX IP 250 OP 636: Performed by: PHYSICIAN ASSISTANT

## 2019-04-23 PROCEDURE — 92610 EVALUATE SWALLOWING FUNCTION: CPT | Mod: GN | Performed by: SPEECH-LANGUAGE PATHOLOGIST

## 2019-04-23 PROCEDURE — 70553 MRI BRAIN STEM W/O & W/DYE: CPT

## 2019-04-23 PROCEDURE — 99233 SBSQ HOSP IP/OBS HIGH 50: CPT | Performed by: INTERNAL MEDICINE

## 2019-04-23 PROCEDURE — 25000128 H RX IP 250 OP 636: Performed by: INTERNAL MEDICINE

## 2019-04-23 PROCEDURE — A9585 GADOBUTROL INJECTION: HCPCS | Performed by: INTERNAL MEDICINE

## 2019-04-23 PROCEDURE — 80048 BASIC METABOLIC PNL TOTAL CA: CPT | Performed by: INTERNAL MEDICINE

## 2019-04-23 PROCEDURE — 00000146 ZZHCL STATISTIC GLUCOSE BY METER IP

## 2019-04-23 PROCEDURE — 25800030 ZZH RX IP 258 OP 636: Performed by: INTERNAL MEDICINE

## 2019-04-23 PROCEDURE — 25500064 ZZH RX 255 OP 636: Performed by: INTERNAL MEDICINE

## 2019-04-23 PROCEDURE — 25000132 ZZH RX MED GY IP 250 OP 250 PS 637: Performed by: INTERNAL MEDICINE

## 2019-04-23 PROCEDURE — 92526 ORAL FUNCTION THERAPY: CPT | Mod: GN | Performed by: SPEECH-LANGUAGE PATHOLOGIST

## 2019-04-23 PROCEDURE — 99291 CRITICAL CARE FIRST HOUR: CPT | Performed by: PSYCHIATRY & NEUROLOGY

## 2019-04-23 PROCEDURE — 36415 COLL VENOUS BLD VENIPUNCTURE: CPT | Performed by: INTERNAL MEDICINE

## 2019-04-23 PROCEDURE — 85027 COMPLETE CBC AUTOMATED: CPT | Performed by: INTERNAL MEDICINE

## 2019-04-23 PROCEDURE — 99232 SBSQ HOSP IP/OBS MODERATE 35: CPT | Performed by: NEUROLOGICAL SURGERY

## 2019-04-23 PROCEDURE — 12000000 ZZH R&B MED SURG/OB

## 2019-04-23 RX ORDER — AMLODIPINE BESYLATE 5 MG/1
5 TABLET ORAL DAILY
Status: DISCONTINUED | OUTPATIENT
Start: 2019-04-23 | End: 2019-04-29 | Stop reason: HOSPADM

## 2019-04-23 RX ORDER — HYDROMORPHONE HYDROCHLORIDE 1 MG/ML
.3-.5 INJECTION, SOLUTION INTRAMUSCULAR; INTRAVENOUS; SUBCUTANEOUS
Status: DISCONTINUED | OUTPATIENT
Start: 2019-04-23 | End: 2019-04-29 | Stop reason: HOSPADM

## 2019-04-23 RX ORDER — HYDRALAZINE HYDROCHLORIDE 20 MG/ML
10-20 INJECTION INTRAMUSCULAR; INTRAVENOUS EVERY 4 HOURS PRN
Status: DISCONTINUED | OUTPATIENT
Start: 2019-04-23 | End: 2019-04-29 | Stop reason: HOSPADM

## 2019-04-23 RX ORDER — GADOBUTROL 604.72 MG/ML
10 INJECTION INTRAVENOUS ONCE
Status: COMPLETED | OUTPATIENT
Start: 2019-04-23 | End: 2019-04-23

## 2019-04-23 RX ADMIN — AMLODIPINE BESYLATE 5 MG: 5 TABLET ORAL at 16:40

## 2019-04-23 RX ADMIN — Medication 0.2 MG: at 16:40

## 2019-04-23 RX ADMIN — LABETALOL 20 MG/4 ML (5 MG/ML) INTRAVENOUS SYRINGE 10 MG: at 23:16

## 2019-04-23 RX ADMIN — LEVETIRACETAM 500 MG: 5 INJECTION INTRAVENOUS at 09:10

## 2019-04-23 RX ADMIN — GADOBUTROL 10 ML: 604.72 INJECTION INTRAVENOUS at 21:00

## 2019-04-23 RX ADMIN — Medication 0.2 MG: at 20:19

## 2019-04-23 RX ADMIN — SODIUM CHLORIDE, POTASSIUM CHLORIDE, SODIUM LACTATE AND CALCIUM CHLORIDE: 600; 310; 30; 20 INJECTION, SOLUTION INTRAVENOUS at 22:31

## 2019-04-23 RX ADMIN — LEVETIRACETAM 500 MG: 5 INJECTION INTRAVENOUS at 22:43

## 2019-04-23 RX ADMIN — Medication 0.2 MG: at 14:03

## 2019-04-23 RX ADMIN — SODIUM CHLORIDE, POTASSIUM CHLORIDE, SODIUM LACTATE AND CALCIUM CHLORIDE: 600; 310; 30; 20 INJECTION, SOLUTION INTRAVENOUS at 00:21

## 2019-04-23 ASSESSMENT — ACTIVITIES OF DAILY LIVING (ADL)
ADLS_ACUITY_SCORE: 20
ADLS_ACUITY_SCORE: 15

## 2019-04-23 ASSESSMENT — MIFFLIN-ST. JEOR: SCORE: 1814.25

## 2019-04-23 NOTE — PLAN OF CARE
N: Pt disoriented to time, place, and situation. PERRLA. Intermittently follows commands and moves all extremities. Remains on precedex for agitation.    CV: Tele shows SR/SB. BPs WDL.    Pulm: Lung sounds clear/diminished. 3L nasal cannula.    GI/: Incontinent of urine. NPO pending swallow eval.    Skin: Intact.    Family present last evening, updated on plan of care. Will continue to monitor closely.

## 2019-04-23 NOTE — PROGRESS NOTES
04/23/19 1424   General Information   Onset Date 04/22/19   Start of Care Date 04/23/19   Referring Physician Bijan Starkey   Patient Profile Review/OT: Additional Occupational Profile Info See Profile for full history and prior level of function   Patient/Family Goals Statement Patient is thirsty.   Swallowing Evaluation Bedside swallow evaluation   Behaviorial Observations Alert;Distractible   Mode of current nutrition NPO   Respiratory Status Room air   Comments Apolinar Mcgregor is a 58 year old male with a history of HTN who presented to the ED with altered mental status and was found to have an acute parenchymal hemorrhage    Clinical Swallow Evaluation   Oral Musculature generally intact   Structural Abnormalities none present   Dentition present and adequate   Mucosal Quality adequate   Mandibular Strength and Mobility intact   Oral Labial Strength and Mobility WFL   Lingual Strength and Mobility WFL   Velar Elevation intact   Buccal Strength and Mobility intact   Laryngeal Function Cough;Throat clear;Swallow;Voicing initiated;Dry swallow palpated   Additional Documentation Yes   Swallow Eval   Feeding Assistance frequent cues/help required   Clinical Swallow Eval: Thin Liquid Texture Trial   Mode of Presentation, Thin Liquids cup;spoon;straw;self-fed;fed by clinician   Volume of Liquid or Food Presented 6 oz of water   Oral Phase of Swallow Premature pharyngeal entry   Pharyngeal Phase of Swallow impaired;reduction in laryngeal movement;repeated swallows;throat clearing   Diagnostic Statement Tolerated single small sips via the cup despite delay.    Clinical Swallow Eval: Puree Solid Texture Trial   Mode of Presentation, Puree spoon;fed by clinician   Volume of Puree Presented 4 teaspoons of pudding   Oral Phase, Puree WFL   Pharyngeal Phase, Puree impaired;reduction in laryngeal movement;repeated swallows   Diagnostic Statement decreased laryngeal elevation.   Clinical Swallow Eval: Solid Food  Texture Trial   Mode of Presentation, Solid self-fed   Volume of Solid Food Presented 1 russell cracker   Oral Phase, Solid Residue in oral cavity   Oral Residue, Solid mid posterior tongue   Pharyngeal Phase, Solid impaired;reduction in laryngeal movement;throat clearing;repeated swallows   Diagnostic Statement Minimal to mild oral residue and throat clearing.    Swallow Compensations   Swallow Compensations Alternate viscosity of consistencies;Pacing;Reduce amounts;Multiple swallow   Results Suspect silent aspiration;Oral difficulties only   General Therapy Interventions   Planned Therapy Interventions Dysphagia Treatment   Dysphagia treatment Modified diet education;Instruction of safe swallow strategies   Swallow Eval: Clinical Impressions   Skilled Criteria for Therapy Intervention Skilled criteria met.  Treatment indicated.   Functional Assessment Scale (FAS) 4   Treatment Diagnosis Mild to moderate oral and pharyngeal dysphagia   Diet texture recommendations Dysphagia diet level 2;Thin liquids   Recommended Feeding/Eating Techniques alternate between small bites and sips of food/liquid;check mouth frequently for oral residue/pocketing;hard swallow w/ each bite or sip;maintain upright posture during/after eating for 30 mins;no straws;small sips/bites   Therapy Frequency daily   Predicted Duration of Therapy Intervention (days/wks) 1 week   Anticipated Discharge Disposition inpatient rehabilitation facility   Risks and Benefits of Treatment have been explained. Yes   Patient, family and/or staff in agreement with Plan of Care Yes   Clinical Impression Comments Patient presents with mild to moderate oral and pharyngeal dysphagia at bedside secondary to a left bleed. Deficits include; reduced laryngeal elevation, premature entry and mild delay in his swallow response suspected. Premature entry of thin liquids with reduced laryngeal elevation with mild throat clearing noted via the straw and with larger bolus by  the cup. Tolerated with single small swallows via the cup and spoon. Pudding was tolerated without difficulty. Mastication was mildly prolonged for a solid with minimal to mild oral residue, that he completed lingual searching independently. Reduced laryngeal elevation with throat clearing x2. He reports mild discomfort when swallowing dry textures on the left side. Recommend: 1. DDL 2 with thin liquids. 2. Feed only if fully alert, sitting upright, no straws, small bites/sips, double swallow and alternate liquids/solids. Hold diet if Sx of aspiration present or decline in his respiratory status.    Total Evaluation Time   Total Evaluation Time (Minutes) 15

## 2019-04-23 NOTE — PLAN OF CARE
Discharge Planner SLP   Patient plan for discharge: Not addressed.  Current status: Bedside swallow evaluation completed. Patient presents with mild to moderate oral and pharyngeal dysphagia at bedside secondary to a left bleed. Deficits include; reduced laryngeal elevation, premature entry and mild delay in his swallow response suspected. Premature entry of thin liquids with reduced laryngeal elevation with mild throat clearing noted via the straw and with larger bolus by the cup. Tolerated with single small swallows via the cup and spoon. Pudding was tolerated without difficulty. Mastication was mildly prolonged for a solid with minimal to mild oral residue, that he completed lingual searching independently. Reduced laryngeal elevation with throat clearing x2. He reports mild discomfort when swallowing dry textures on the left side. Recommend: 1. DDL 2 with thin liquids. 2. Feed only if fully alert, sitting upright, no straws, small bites/sips, double swallow and alternate liquids/solids. Hold diet if Sx of aspiration present or decline in his respiratory status.   Barriers to return to prior living situation: Acuity of his illness.  Recommendations for discharge: [facility]  Rationale for recommendations: Anticipate swallow goals to be met by discharge. He would be able to tolerate 3 hours of therapy per day. He is improving and motivated with good family support.        Entered by: Claudia Ryder 04/23/2019 3:02 PM

## 2019-04-23 NOTE — PROGRESS NOTES
Vascular Neurology Progress Note      Chief complaint: Altered Mental Status (pt found unresponsive in car, became confused flailing around and screaming non sensical)      Overnight events: None, seems more calm and is on the lowest dose of precedex. Denies any headache or back pain. BP has been in 120-140 range without frequent antihypertensive pushes      Impression:  Intracerebral Hemorrhage:   L parietal and occipital spontaneous intraparenchymal hemorrhage, ICH score 0, 17cc, bleed day #2  Cerebral edema  Seizure on presentation - has R tongue bite  Agitation due to postictal state - resolved    Recommendations:  Acute Hemorrhagic Stroke Recommendations  - Neurochecks q1 hr - ok to change to q2 if leaves ICU today  - MRI brain tonight  - Wean off precedex  - NPO for tentative cerebral angiogram tomorrow unless brain mri explanatory for ICH  - Seizure prophylaxis with Keppra 500mg q12 hours for at least a month. Can switch to po when taking po consistently.  - Systolic BP Goal: < 140  - Maintain normoglycemia, normothermia, normonatremia  - Head of bed elevated at least 30 degrees  - PT/OT/SLP  - Stroke Education        Patient follow up:  - final recommendation pending work-up    Shayna Koch PA-C  Neurology  04/23/2019 11:31 AM  Pager: [phone]    ____________________________________________________________________      Medications    Scheduled medications    levETIRAcetam  500 mg Intravenous Q12H     sodium chloride 0.9 %  90 mL Intravenous Once       Infusion medications    dexmedetomidine 0.2 mcg/kg/hr (04/22/19 2030)     lactated ringers 100 mL/hr at 04/23/19 0021     - MEDICATION INSTRUCTIONS -       sodium chloride         PRN medications  Current Facility-Administered Medications   Medication Dose Route Frequency     sodium chloride 0.9%  250 mL Intravenous Once PRN     hydrALAZINE  10-20 mg Intravenous Q1H PRN     HYDROmorphone  0.2 mg Intravenous Q1H PRN     labetalol  10-20 mg Intravenous  Q10 Min PRN     - MEDICATION INSTRUCTIONS -   Does not apply Continuous PRN     metoclopramide  10 mg Oral Q6H PRN    Or     metoclopramide  10 mg Intravenous Q6H PRN     naloxone  0.1-0.4 mg Intravenous Q2 Min PRN     OLANZapine  10 mg Intramuscular Daily PRN     ondansetron  4 mg Oral Q6H PRN    Or     ondansetron  4 mg Intravenous Q6H PRN     prochlorperazine  10 mg Intravenous Q6H PRN    Or     prochlorperazine  10 mg Oral Q6H PRN    Or     prochlorperazine  25 mg Rectal Q12H PRN       Allergies  No Known Allergies      Physical Examination    Vital Signs:  Temp:  [97.7  F (36.5  C)-98.9  F (37.2  C)] 98.3  F (36.8  C)  Pulse:  [] 54  Heart Rate:  [] 55  Resp:  [10-32] 14  BP: ()/(65-95) 110/71  SpO2:  [93 %-100 %] 98 %     General: Lying in bed in NAD, keeps eyes closed  Heent: Has R side tongue bite consistent with seizure    (examined on Precedex 0.2)   Neurologic  Mental Status:  follows 1 step commands, oriented to self, place, situation but thinks month is March  Cranial Nerves:  PERRL, facial movements symmetric, hearing not formally tested but intact to conversation, shoulder shrug strong bilaterally  Motor:  5/5 throughout  Reflexes:  deferred  Sensory:  intacto to light touch throughout, no sensory extinction  Coordination:  unable to assess, keeps eyes closed  Station/Gait:  deferred due to acute illness      Labs/Studies:  CBC:     Recent Labs   Lab 04/23/19  0459 04/22/19  1050   WBC 7.0 14.1*   RBC 4.94 5.52   HGB 15.1 16.8   HCT 42.8 50.2   * 191     Basic Metabolic Panel:   Recent Labs   Lab 04/23/19  0459 04/22/19  1050    144   POTASSIUM 3.8 3.8   CHLORIDE 111* 106   CO2 28 14*   BUN 11 20   CR 0.80 1.10   * 178*   ALAN 8.8 9.5     Liver panel:  Recent Labs   Lab Test 04/22/19  1050   PROTTOTAL 8.5   ALBUMIN 4.1   BILITOTAL 0.4   ALKPHOS 101   AST 25   ALT 43     INR:  Recent Labs   Lab Test 04/22/19  1050 04/30/13  1332   INR 1.17* 1.01      Lipid  Profile:No lab results found.  A1C: No lab results found.  Troponin I: No results for input(s): TROPI in the last 168 hours.      Imaging:  I personally reviewed all of the following studies:    CT head 4/23:  IMPRESSION:  1. No change in the size of the hematoma in left parietal and  occipital lobes or the adjacent vasogenic edema.  2. Slight increase in midline shift to the right.  3. Brain atrophy is unchanged.    CTA head/neck 4/23:  IMPRESSION:   1. Normal CT angiogram of the head and neck.  2. No evidence of an underlying vascular lesion or tumor adjacent to  the left parieto-occipital hematoma.    CTV head/neck 4/23:  IMPRESSION: Normal neck and head CT venogram. No evidence for dural  sinus thrombosis.

## 2019-04-23 NOTE — PROGRESS NOTES
[facility]    Medicine Progress Note - Hospitalist Service       Date of Admission:  4/22/2019  Assessment & Plan   Apolinar Mcgregor is a 58 year old male with a history of HTN who presented to the ED with altered mental status and was found to have an acute parenchymal hemorrhage      Acute parenchymal hemorrhage  AMS due to above.  Resolved   Patient initially presented with AMS.  Found to have an acute parenchymal hemorrhage in the left parietal and occipital lobs measuring up to 4.7 cm with 4 mm rightward midline shift.  Wife does not know of any traumatic head injuries.  Does have a history of HTN but blood pressure well controlled on presentation.  UDS negative.  ED did speak with neurosurgery who recommended admission to ICU and continued monitoring  Repeat CT head showed stable hemorrhage   - Neuro checks q2h if leaves ICU   - Goal SBP <140 with PRN Labetalol and Hydralazine  - PT/OT/Speech consulted   - Head of bed at 30 degrees  - Neurosurgery and neurology consulted.  Appreciate their assistance.  No plans for surgical intervention at this time.  Plan for MRI      Anion gap metabolic acidosis. Resolved   Bicarb 14 and anion gap 24.  Unclear etiology at this time.  Given the bleed it is possible the patient had a seizure and this could have elevated his lactate.      H/o HTN   Blood pressures well controlled at this time without intervention  - Restarted PTA Norvasc       Diet: NPO for Medical/Clinical Reasons Except for: Other; Specify: Until Dysphagia/Stroke Swallow Screen Completed (except Status Epileptics patients)    DVT Prophylaxis: Pneumatic Compression Devices  Hinson Catheter: in place, indication:    Code Status: Full Code      Disposition Plan   Expected discharge: 1-2 days.  Still needs PT/OT evaluation.  May need TCU or ARU depending on therapy evaluation   Entered: Bijan Hernandez DO 04/23/2019, 10:28 AM       The patient's care was discussed with the Bedside Nurse, Patient  and Patient's Family.    Bijan Hernandez, DO  Hospitalist Service  [facility]    ______________________________________________________________________    Interval History   Patient seen and examined.  Feeling improved and we were able to remove restraints.  No headache at this time.  No new focal deficits.  No chest pain or SOB.     Data reviewed today: I reviewed all medications, new labs and imaging results over the last 24 hours. I personally reviewed no images or EKG's today.    Physical Exam   Vital Signs: Temp: 98.3  F (36.8  C) Temp src: Axillary BP: 110/71 Pulse: 54 Heart Rate: 55 Resp: 14 SpO2: 98 % O2 Device: Nasal cannula Oxygen Delivery: 3 LPM  Weight: 217 lbs 13.03 oz  General Appearance: Resting comfortably.  NAD.  Alert and answering questions appopriately  Respiratory: Clear to auscultation.  No respiratory distress on NC   Cardiovascular: RRR.  No obvious murmurs  GI: Bowel sounds present.  Non-tender  Skin: No rashes.  No cyanosis  Other: No edema.  Moving all 4 extremities grossly      Data   Recent Labs   Lab 04/23/19  0459 04/22/19  1050   WBC 7.0 14.1*   HGB 15.1 16.8   MCV 87 91   * 191   INR  --  1.17*    144   POTASSIUM 3.8 3.8   CHLORIDE 111* 106   CO2 28 14*   BUN 11 20   CR 0.80 1.10   ANIONGAP 5 24*   ALAN 8.8 9.5   * 178*   ALBUMIN  --  4.1   PROTTOTAL  --  8.5   BILITOTAL  --  0.4   ALKPHOS  --  101   ALT  --  43   AST  --  25     Recent Results (from the past 24 hour(s))   CT Head w/o Contrast    Narrative    CT SCAN OF THE HEAD WITHOUT CONTRAST   4/22/2019 6:42 PM     HISTORY: Intracranial hemorrhage, known, follow up.    TECHNIQUE:  Axial images of the head and coronal reformations without  IV contrast material. Radiation dose for this scan was reduced using  automated exposure control, adjustment of the mA and/or kV according  to patient size, or iterative reconstruction technique.    COMPARISON: 4/22/2019 at 11:27 AM    FINDINGS:  The  hematoma in the medial aspect of the left parietal and  occipital lobes measures 4.9 x 5.0 x 2.3 cm diameter, unchanged  accounting for differences in angulation of the current CT scan with  the previous  one. There is surrounding vasogenic edema and mild mass effect on the  left lateral ventricle. Midline structures are shifted to the right by  0.6 cm, slightly worse. No new areas of hemorrhage are seen. There is  generalized atrophy of the brain, unchanged.     The visualized portions of the sinuses and mastoids appear normal. No  fracture is seen.      Impression    IMPRESSION:  1. No change in the size of the hematoma in left parietal and  occipital lobes or the adjacent vasogenic edema.  2. Slight increase in midline shift to the right.  3. Brain atrophy is unchanged.    DAJUAN MALDONADO MD   CTA Head Neck with Contrast    Narrative    CT ANGIOGRAM OF THE HEAD AND NECK WITHOUT AND WITH CONTRAST  4/22/2019   9:30 PM     HISTORY: Left parieto-occipital hematoma. Rule out underlying vascular  malformation or tumor.    TECHNIQUE: Precontrast localizing scans were followed by CT  angiography with an injection of 70mL Isovue-370 IV with scans through  the head and neck. Images were transferred to a separate 3-D  workstation where multiplanar reformations and 3-D images were  created. Estimates of carotid stenoses are made relative to the distal  internal carotid artery diameters except as noted. Radiation dose for  this scan was reduced using automated exposure control, adjustment of  the mA and/or kV according to patient size, or iterative  reconstruction technique.    COMPARISON: CT head today.    CT HEAD FINDINGS: No contrast enhancing lesions. There is specifically  no evidence of enhancing neoplasm or arteriovenous malformation in the  region of the left parieto-occipital hematoma. Cerebral blood flow is  grossly normal.    CT ANGIOGRAM HEAD FINDINGS: Arteries are widely patent with no  aneurysm, significant  stenosis, occlusion or intraarterial thrombus.  Venous circulation is unremarkable. No evidence of dural venous sinus  thrombosis.    CT ANGIOGRAM NECK FINDINGS:   Right carotid artery: No significant stenosis.     Left carotid artery: No significant stenosis.     Vertebral arteries: No significant stenosis.     Other findings: None.      Impression    IMPRESSION:   1. Normal CT angiogram of the head and neck.  2. No evidence of an underlying vascular lesion or tumor adjacent to  the left parieto-occipital hematoma.    DAJUAN MALDONADO MD   CTV Head Neck w Contrast    Narrative    CTV HEAD/NECK WITH CONTRAST April 22, 2019 9:31 PM     HISTORY: Dural venous sinus thrombosis suspected.    TECHNIQUE: Thin section axial CT images of the head and neck were  acquired during the delayed arterial, early venous phase of  intravenous contrast (70 mL Isovue-370 that was administered for the  early arterial CT angiogram of the head and neck). Multiplanar  reconstructions, maximum intensity projection reconstructions and  shaded surface 3-D reconstructions were created.    COMPARISON: Head CT same day.    FINDINGS: The superior sagittal, straight, bilateral transverse and  bilateral sigmoid sinuses are patent and unremarkable. The bilateral  internal cerebral veins, bilateral basal veins of Jamie and vein  of Harsh are patent and unremarkable. No definite cortical vein  occlusions identified. The dominant right internal jugular and small  left internal jugular veins are patent without filling defect.      Impression    IMPRESSION: Normal neck and head CT venogram. No evidence for dural  sinus thrombosis.

## 2019-04-23 NOTE — PLAN OF CARE
SLP: Attempted to see patient for a bedside swallow evaluation but per his nurse stated he was to confused to participate this morning. Will try again in the afternoon.

## 2019-04-23 NOTE — PROGRESS NOTES
"[facility]    Neurosurgery Progress Note    Date of Service (when I saw the patient): 04/23/2019     Assessment & Plan   Apolinar Mcgregor is a 58 year old male who was admitted on 4/22/2019.     Active Problems:    Intracranial hemorrhage (H)    Assessment: Left parietal-occipital spontaneous intraparenchymal hemorrhage    Plan:     Appreciate NICU care  No surgical intervention recommended  Could consider diagnostic angiogram for further evaluation    Interval History   Continued lethargy this AM on precedex    Physical Exam   Temp: 97.8  F (36.6  C) Temp src: Oral BP: 127/82 Pulse: 62 Heart Rate: 56 Resp: 10 SpO2: 99 % O2 Device: Nasal cannula Oxygen Delivery: 3 LPM  Vitals:    04/22/19 1140 04/22/19 1245 04/23/19 0400   Weight: 96.2 kg (212 lb) 96.1 kg (211 lb 13.8 oz) 98.8 kg (217 lb 13 oz)     Vital Signs with Ranges  Temp:  [97.7  F (36.5  C)-98.9  F (37.2  C)] 97.8  F (36.6  C)  Pulse:  [] 62  Heart Rate:  [] 56  Resp:  [10-32] 10  BP: ()/() 127/82  SpO2:  [93 %-100 %] 99 %  I/O last 3 completed shifts:  In: 1789.96 [I.V.:1789.96]  Out: 825 [Urine:825]    Heart Rate: 56, Blood pressure 127/82, pulse 62, temperature 97.8  F (36.6  C), temperature source Oral, resp. rate 10, height 1.778 m (5' 10\"), weight 98.8 kg (217 lb 13 oz), SpO2 99 %.  217 lbs 13.03 oz  HEENT:  Normocephalic, atraumatic.  PERRLA.  EOM s intact.    Neck:  Supple, non-tender, without lymphadenopathy.  Heart:  No peripheral edema  Lungs:  No SOB  Abdomen:  Non-distended.   Skin:  Warm and dry.  Extremities:  No edema, cyanosis or clubbing.    NEUROLOGICAL EXAMINATION:     Mental status:  Alert and Oriented x 3, speech is fluent.  Eyes closed  Cranial nerves:  II-XII intact.   Motor:  Strength is 5/5 throughout the upper and lower extremities  Shoulder Abduction:  Right:  5/5   Left:  5/5  Biceps:                      Right:  5/5   Left:  5/5  Triceps:                     Right:  5/5   Left:  5/5  Wrist " Extensors:       Right:  5/5   Left:  5/5  Wrist Flexors:           Right:  5/5   Left:  5/5  interosseus :            Right:  5/5   Left:  5/5   Hip Flexor:                Right: 5/5  Left:  5/5  Hip Adductor:             Right:  5/5  Left:  5/5  Hip Abductor:             Right:  5/5  Left:  5/5  Gastroc Soleus:        Right:  5/5  Left:  5/5  Tib/Ant:                      Right:  5/5  Left:  5/5  EHL:                     Right:  5/5  Left:  5/5  Sensation:  Intact  Reflexes:  Negative Babinski.  Negative Clonus.    Coordination:  Smooth finger to nose testing.   Negative pronator drift.      Medications     dexmedetomidine 0.2 mcg/kg/hr (04/22/19 2030)     lactated ringers 100 mL/hr at 04/23/19 0021     - MEDICATION INSTRUCTIONS -       sodium chloride         levETIRAcetam  500 mg Intravenous Q12H     sodium chloride 0.9 %  90 mL Intravenous Once       Data     All new lab and imaging data was personally reviewed by me.    CBC RESULTS:   Recent Labs   Lab Test 04/23/19  0459   WBC 7.0   RBC 4.94   HGB 15.1   HCT 42.8   MCV 87   MCH 30.6   MCHC 35.3   RDW 14.1   *     Basic Metabolic Panel:  Lab Results   Component Value Date     04/23/2019      Lab Results   Component Value Date    POTASSIUM 3.8 04/23/2019     Lab Results   Component Value Date    CHLORIDE 111 04/23/2019     Lab Results   Component Value Date    ALAN 8.8 04/23/2019     Lab Results   Component Value Date    CO2 28 04/23/2019     Lab Results   Component Value Date    BUN 11 04/23/2019     Lab Results   Component Value Date    CR 0.80 04/23/2019     Lab Results   Component Value Date     04/23/2019     INR:  Lab Results   Component Value Date    INR 1.17 04/22/2019    INR 1.01 04/30/2013

## 2019-04-23 NOTE — PLAN OF CARE
Lethargic and confused this AM. Mentation improving. Neurologic status improved. Pupils PERRL. Calm and cooperative, Precedex stopped at 1130.   LS clear. 5/5 strength in all extremities. B/P maintained >140. NPO. Voiding in urinal at bedside. Bedrest. Family at bedside and updated on plan of care, supportive.  Neurology, Neuro Surg following. Possible transfer this afternoon.     Update 1838.. No acute changes. Alert but confused, forgetful. Able to follow all commands. MRI tonight. OK to transfer per Neurology team.  Tolerating PO well, No N/V. Straight cath x1, Frequency and hesitancy with voiding. IV dilaudid given x2 for back spasms, relief of S/Sy seen

## 2019-04-24 ENCOUNTER — APPOINTMENT (OUTPATIENT)
Dept: INTERVENTIONAL RADIOLOGY/VASCULAR | Facility: CLINIC | Age: 59
DRG: 020 | End: 2019-04-24
Attending: INTERNAL MEDICINE
Payer: COMMERCIAL

## 2019-04-24 LAB
ANION GAP SERPL CALCULATED.3IONS-SCNC: 5 MMOL/L (ref 3–14)
APTT PPP: 34 SEC (ref 22–37)
BUN SERPL-MCNC: 9 MG/DL (ref 7–30)
CALCIUM SERPL-MCNC: 9 MG/DL (ref 8.5–10.1)
CHLORIDE SERPL-SCNC: 107 MMOL/L (ref 94–109)
CHOLEST SERPL-MCNC: 116 MG/DL
CO2 SERPL-SCNC: 28 MMOL/L (ref 20–32)
CREAT SERPL-MCNC: 0.91 MG/DL (ref 0.66–1.25)
CREAT SERPL-MCNC: 0.94 MG/DL (ref 0.66–1.25)
ERYTHROCYTE [DISTWIDTH] IN BLOOD BY AUTOMATED COUNT: 14.1 % (ref 10–15)
ERYTHROCYTE [DISTWIDTH] IN BLOOD BY AUTOMATED COUNT: 14.1 % (ref 10–15)
GFR SERPL CREATININE-BSD FRML MDRD: 89 ML/MIN/{1.73_M2}
GFR SERPL CREATININE-BSD FRML MDRD: >90 ML/MIN/{1.73_M2}
GLUCOSE SERPL-MCNC: 110 MG/DL (ref 70–99)
HBA1C MFR BLD: 6.3 % (ref 0–5.6)
HCT VFR BLD AUTO: 44.5 % (ref 40–53)
HCT VFR BLD AUTO: 44.9 % (ref 40–53)
HDLC SERPL-MCNC: 49 MG/DL
HGB BLD-MCNC: 15.4 G/DL (ref 13.3–17.7)
HGB BLD-MCNC: 15.5 G/DL (ref 13.3–17.7)
INR PPP: 1.04 (ref 0.86–1.14)
LDLC SERPL CALC-MCNC: 50 MG/DL
MCH RBC QN AUTO: 30.1 PG (ref 26.5–33)
MCH RBC QN AUTO: 30.3 PG (ref 26.5–33)
MCHC RBC AUTO-ENTMCNC: 34.5 G/DL (ref 31.5–36.5)
MCHC RBC AUTO-ENTMCNC: 34.6 G/DL (ref 31.5–36.5)
MCV RBC AUTO: 87 FL (ref 78–100)
MCV RBC AUTO: 87 FL (ref 78–100)
NONHDLC SERPL-MCNC: 67 MG/DL
PLATELET # BLD AUTO: 150 10E9/L (ref 150–450)
PLATELET # BLD AUTO: 156 10E9/L (ref 150–450)
POTASSIUM SERPL-SCNC: 3.7 MMOL/L (ref 3.4–5.3)
RBC # BLD AUTO: 5.09 10E12/L (ref 4.4–5.9)
RBC # BLD AUTO: 5.15 10E12/L (ref 4.4–5.9)
SODIUM SERPL-SCNC: 140 MMOL/L (ref 133–144)
TRIGL SERPL-MCNC: 84 MG/DL
WBC # BLD AUTO: 6.5 10E9/L (ref 4–11)
WBC # BLD AUTO: 7.7 10E9/L (ref 4–11)

## 2019-04-24 PROCEDURE — C1769 GUIDE WIRE: HCPCS

## 2019-04-24 PROCEDURE — 25000128 H RX IP 250 OP 636: Performed by: INTERNAL MEDICINE

## 2019-04-24 PROCEDURE — 36415 COLL VENOUS BLD VENIPUNCTURE: CPT | Performed by: PSYCHIATRY & NEUROLOGY

## 2019-04-24 PROCEDURE — 25000125 ZZHC RX 250: Performed by: INTERNAL MEDICINE

## 2019-04-24 PROCEDURE — 25000132 ZZH RX MED GY IP 250 OP 250 PS 637: Performed by: PSYCHIATRY & NEUROLOGY

## 2019-04-24 PROCEDURE — 85027 COMPLETE CBC AUTOMATED: CPT | Performed by: PSYCHIATRY & NEUROLOGY

## 2019-04-24 PROCEDURE — 80061 LIPID PANEL: CPT | Performed by: PSYCHIATRY & NEUROLOGY

## 2019-04-24 PROCEDURE — 25000128 H RX IP 250 OP 636: Performed by: PSYCHIATRY & NEUROLOGY

## 2019-04-24 PROCEDURE — 27210894 ZZH CATH CR6

## 2019-04-24 PROCEDURE — 27210805 ZZH SHEATH CR4

## 2019-04-24 PROCEDURE — 27210732 ZZH ACCESSORY CR1

## 2019-04-24 PROCEDURE — B31Q1ZZ FLUOROSCOPY OF CERVICO-CEREBRAL ARCH USING LOW OSMOLAR CONTRAST: ICD-10-PCS | Performed by: PSYCHIATRY & NEUROLOGY

## 2019-04-24 PROCEDURE — 12000000 ZZH R&B MED SURG/OB

## 2019-04-24 PROCEDURE — 85027 COMPLETE CBC AUTOMATED: CPT | Performed by: INTERNAL MEDICINE

## 2019-04-24 PROCEDURE — 85730 THROMBOPLASTIN TIME PARTIAL: CPT | Performed by: PSYCHIATRY & NEUROLOGY

## 2019-04-24 PROCEDURE — 99232 SBSQ HOSP IP/OBS MODERATE 35: CPT | Performed by: INTERNAL MEDICINE

## 2019-04-24 PROCEDURE — 82565 ASSAY OF CREATININE: CPT | Performed by: PSYCHIATRY & NEUROLOGY

## 2019-04-24 PROCEDURE — 99233 SBSQ HOSP IP/OBS HIGH 50: CPT | Performed by: PSYCHIATRY & NEUROLOGY

## 2019-04-24 PROCEDURE — C1760 CLOSURE DEV, VASC: HCPCS

## 2019-04-24 PROCEDURE — 25000132 ZZH RX MED GY IP 250 OP 250 PS 637: Performed by: INTERNAL MEDICINE

## 2019-04-24 PROCEDURE — 25800030 ZZH RX IP 258 OP 636: Performed by: INTERNAL MEDICINE

## 2019-04-24 PROCEDURE — 36415 COLL VENOUS BLD VENIPUNCTURE: CPT | Performed by: INTERNAL MEDICINE

## 2019-04-24 PROCEDURE — 27210886 ZZH ACCESSORY CR5

## 2019-04-24 PROCEDURE — 80048 BASIC METABOLIC PNL TOTAL CA: CPT | Performed by: INTERNAL MEDICINE

## 2019-04-24 PROCEDURE — 25500064 ZZH RX 255 OP 636: Performed by: INTERNAL MEDICINE

## 2019-04-24 PROCEDURE — 25000128 H RX IP 250 OP 636

## 2019-04-24 PROCEDURE — 25000125 ZZHC RX 250

## 2019-04-24 PROCEDURE — 83036 HEMOGLOBIN GLYCOSYLATED A1C: CPT | Performed by: PSYCHIATRY & NEUROLOGY

## 2019-04-24 PROCEDURE — 85610 PROTHROMBIN TIME: CPT | Performed by: PSYCHIATRY & NEUROLOGY

## 2019-04-24 PROCEDURE — 25800030 ZZH RX IP 258 OP 636: Performed by: PSYCHIATRY & NEUROLOGY

## 2019-04-24 PROCEDURE — 36224 PLACE CATH CAROTD ART: CPT | Mod: 50

## 2019-04-24 PROCEDURE — 27210906 ZZH KIT CR8

## 2019-04-24 RX ORDER — DEXTROSE MONOHYDRATE 25 G/50ML
25-50 INJECTION, SOLUTION INTRAVENOUS
Status: DISCONTINUED | OUTPATIENT
Start: 2019-04-24 | End: 2019-04-24

## 2019-04-24 RX ORDER — LIDOCAINE HYDROCHLORIDE 10 MG/ML
INJECTION, SOLUTION INFILTRATION; PERINEURAL
Status: COMPLETED
Start: 2019-04-24 | End: 2019-04-24

## 2019-04-24 RX ORDER — SODIUM CHLORIDE 9 MG/ML
INJECTION, SOLUTION INTRAVENOUS CONTINUOUS
Status: DISCONTINUED | OUTPATIENT
Start: 2019-04-24 | End: 2019-04-24

## 2019-04-24 RX ORDER — HEPARIN SODIUM 1000 [USP'U]/ML
2000 INJECTION, SOLUTION INTRAVENOUS; SUBCUTANEOUS ONCE
Status: COMPLETED | OUTPATIENT
Start: 2019-04-24 | End: 2019-04-24

## 2019-04-24 RX ORDER — LIDOCAINE HYDROCHLORIDE 20 MG/ML
JELLY TOPICAL
Status: DISCONTINUED
Start: 2019-04-24 | End: 2019-04-24

## 2019-04-24 RX ORDER — FENTANYL CITRATE 50 UG/ML
INJECTION, SOLUTION INTRAMUSCULAR; INTRAVENOUS
Status: COMPLETED
Start: 2019-04-24 | End: 2019-04-24

## 2019-04-24 RX ORDER — LIDOCAINE 40 MG/G
CREAM TOPICAL
Status: DISCONTINUED | OUTPATIENT
Start: 2019-04-24 | End: 2019-04-26 | Stop reason: HOSPADM

## 2019-04-24 RX ORDER — NICOTINE POLACRILEX 4 MG
15-30 LOZENGE BUCCAL
Status: DISCONTINUED | OUTPATIENT
Start: 2019-04-24 | End: 2019-04-26 | Stop reason: HOSPADM

## 2019-04-24 RX ORDER — HEPARIN SODIUM 1000 [USP'U]/ML
INJECTION, SOLUTION INTRAVENOUS; SUBCUTANEOUS
Status: COMPLETED
Start: 2019-04-24 | End: 2019-04-24

## 2019-04-24 RX ORDER — SODIUM CHLORIDE 9 MG/ML
INJECTION, SOLUTION INTRAVENOUS CONTINUOUS
Status: DISCONTINUED | OUTPATIENT
Start: 2019-04-24 | End: 2019-04-25

## 2019-04-24 RX ORDER — ACETAMINOPHEN 500 MG
500 TABLET ORAL EVERY 6 HOURS PRN
Status: COMPLETED | OUTPATIENT
Start: 2019-04-24 | End: 2019-04-25

## 2019-04-24 RX ORDER — HEPARIN SODIUM 200 [USP'U]/100ML
500 INJECTION, SOLUTION INTRAVENOUS CONTINUOUS PRN
Status: DISCONTINUED | OUTPATIENT
Start: 2019-04-24 | End: 2019-04-24

## 2019-04-24 RX ORDER — IOPAMIDOL 612 MG/ML
150 INJECTION, SOLUTION INTRAVASCULAR ONCE
Status: COMPLETED | OUTPATIENT
Start: 2019-04-24 | End: 2019-04-24

## 2019-04-24 RX ORDER — FLUMAZENIL 0.1 MG/ML
0.2 INJECTION, SOLUTION INTRAVENOUS
Status: ACTIVE | OUTPATIENT
Start: 2019-04-24 | End: 2019-04-25

## 2019-04-24 RX ORDER — DEXTROSE MONOHYDRATE 25 G/50ML
25-50 INJECTION, SOLUTION INTRAVENOUS
Status: DISCONTINUED | OUTPATIENT
Start: 2019-04-24 | End: 2019-04-26 | Stop reason: HOSPADM

## 2019-04-24 RX ORDER — FENTANYL CITRATE 50 UG/ML
25-50 INJECTION, SOLUTION INTRAMUSCULAR; INTRAVENOUS EVERY 5 MIN PRN
Status: DISCONTINUED | OUTPATIENT
Start: 2019-04-24 | End: 2019-04-24

## 2019-04-24 RX ORDER — NALOXONE HYDROCHLORIDE 0.4 MG/ML
.1-.4 INJECTION, SOLUTION INTRAMUSCULAR; INTRAVENOUS; SUBCUTANEOUS
Status: DISCONTINUED | OUTPATIENT
Start: 2019-04-24 | End: 2019-04-24

## 2019-04-24 RX ORDER — NICOTINE POLACRILEX 4 MG
15-30 LOZENGE BUCCAL
Status: DISCONTINUED | OUTPATIENT
Start: 2019-04-24 | End: 2019-04-24

## 2019-04-24 RX ADMIN — FENTANYL CITRATE 25 MCG: 50 INJECTION, SOLUTION INTRAMUSCULAR; INTRAVENOUS at 11:33

## 2019-04-24 RX ADMIN — HYDROMORPHONE HYDROCHLORIDE 0.5 MG: 1 INJECTION, SOLUTION INTRAMUSCULAR; INTRAVENOUS; SUBCUTANEOUS at 14:30

## 2019-04-24 RX ADMIN — HEPARIN SODIUM 10000 UNITS: 10000 INJECTION INTRAVENOUS; SUBCUTANEOUS at 11:03

## 2019-04-24 RX ADMIN — LEVETIRACETAM 500 MG: 5 INJECTION INTRAVENOUS at 22:26

## 2019-04-24 RX ADMIN — AMLODIPINE BESYLATE 5 MG: 5 TABLET ORAL at 08:10

## 2019-04-24 RX ADMIN — SODIUM CHLORIDE: 9 INJECTION, SOLUTION INTRAVENOUS at 18:58

## 2019-04-24 RX ADMIN — MIDAZOLAM HYDROCHLORIDE 0.5 MG: 1 INJECTION, SOLUTION INTRAMUSCULAR; INTRAVENOUS at 11:33

## 2019-04-24 RX ADMIN — MIDAZOLAM HYDROCHLORIDE 1 MG: 1 INJECTION, SOLUTION INTRAMUSCULAR; INTRAVENOUS at 11:03

## 2019-04-24 RX ADMIN — HYDROMORPHONE HYDROCHLORIDE 0.3 MG: 1 INJECTION, SOLUTION INTRAMUSCULAR; INTRAVENOUS; SUBCUTANEOUS at 10:27

## 2019-04-24 RX ADMIN — HEPARIN SODIUM 2000 UNITS: 1000 INJECTION, SOLUTION INTRAVENOUS; SUBCUTANEOUS at 11:14

## 2019-04-24 RX ADMIN — FENTANYL CITRATE 50 MCG: 50 INJECTION, SOLUTION INTRAMUSCULAR; INTRAVENOUS at 11:04

## 2019-04-24 RX ADMIN — MIDAZOLAM HYDROCHLORIDE 0.5 MG: 1 INJECTION, SOLUTION INTRAMUSCULAR; INTRAVENOUS at 11:16

## 2019-04-24 RX ADMIN — IOPAMIDOL 96 ML: 612 INJECTION, SOLUTION INTRAVENOUS at 11:46

## 2019-04-24 RX ADMIN — SODIUM CHLORIDE: 9 INJECTION, SOLUTION INTRAVENOUS at 10:32

## 2019-04-24 RX ADMIN — FENTANYL CITRATE 25 MCG: 50 INJECTION, SOLUTION INTRAMUSCULAR; INTRAVENOUS at 11:17

## 2019-04-24 RX ADMIN — HYDROMORPHONE HYDROCHLORIDE 0.3 MG: 1 INJECTION, SOLUTION INTRAMUSCULAR; INTRAVENOUS; SUBCUTANEOUS at 03:43

## 2019-04-24 RX ADMIN — LEVETIRACETAM 500 MG: 5 INJECTION INTRAVENOUS at 11:39

## 2019-04-24 RX ADMIN — LIDOCAINE HYDROCHLORIDE 6 ML: 20 JELLY TOPICAL at 09:28

## 2019-04-24 RX ADMIN — SODIUM CHLORIDE, POTASSIUM CHLORIDE, SODIUM LACTATE AND CALCIUM CHLORIDE: 600; 310; 30; 20 INJECTION, SOLUTION INTRAVENOUS at 08:45

## 2019-04-24 RX ADMIN — ACETAMINOPHEN 500 MG: 500 TABLET, FILM COATED ORAL at 18:15

## 2019-04-24 RX ADMIN — LIDOCAINE HYDROCHLORIDE 10 ML: 10 INJECTION, SOLUTION INFILTRATION; PERINEURAL at 11:09

## 2019-04-24 ASSESSMENT — MIFFLIN-ST. JEOR: SCORE: 1805.11

## 2019-04-24 ASSESSMENT — ACTIVITIES OF DAILY LIVING (ADL)
ADLS_ACUITY_SCORE: 16
ADLS_ACUITY_SCORE: 20
ADLS_ACUITY_SCORE: 15
ADLS_ACUITY_SCORE: 19
ADLS_ACUITY_SCORE: 19
ADLS_ACUITY_SCORE: 15

## 2019-04-24 NOTE — IR NOTE
Interventional Radiology Intra-procedural Nursing Note    Patient Name: Apolinar Mcgregor  Medical Record Number: [id number]  Today's Date: April 24, 2019    Start Time: 1110  End of procedure time: 1146  Procedure: bilateral cerebral angiogram, diagnostic  Report given to: Station [address] LOLITA Aleman  Time pt departs:  1150  : n/a    Other Notes:   Patient arrives to Ir suite 3. Identification confirmed and consent verified. Patient was then assisted onto procedure table, positioned safely and connected to monitoring equipment. Access site bilateral groin was prepped and patient draped under sterile technique.     Versed 2mg  Fentanyl 100mcg    Patient tolerated procedure well. VSS. Patient alert, respirations regular and unlabored, no c/o pain at this time.     Patient transferred back to [id number] in stable condition accompanied by IR LOLITA Byrd.    Nabila Kohli RN on 4/24/2019 at 12:02 PM          5f RFA sheath removed at 1143, angioseal placed at that time, site is c/d/i with sterile dressing, no hematoma

## 2019-04-24 NOTE — PROVIDER NOTIFICATION
"Text page sent to Dr. Hernandez: \"Pt retaining urine. Has been straight cathed 2x. PVR > 500 ml. should we continue to straight cath or would you like a tracy placed?\"  "

## 2019-04-24 NOTE — PLAN OF CARE
"Pt here with ICH L parietal/occipital w/ spontaneous intraparenchymal hem. D/t L occ dural AVF. A&Ox2, disoriented to time and situation. Forgetful at times. R groin site, WDL but weak +1 bilat popliteal pulses. VSS, last frequent vital check at 1920.  Tele NSR. DD2 diet, thin liquids. Takes pills whole with water. Hinson patent, adequate output in place d/t retention. SZR precautions, no szr activity noted. Orders to rest for remainder of the day in bed. NaCl infusing at 100 ml/hr. C/o lower back pain decreased with rest and tylenol x1, states \"dilaudid does nothing for me\". Plan pending dAVF embolization on Friday.    "

## 2019-04-24 NOTE — BRIEF OP NOTE
[facility]     Endovascular Surgical Neuroradiology Post-Procedure Note    Pre-Procedure Diagnosis:  Left parieto-occipital ICH  Post-Procedure Diagnosis:  Same as above    Procedure(s):   Diagnostic cervicocerebral angiography    Findings:  Left occipital (possibly minor contribution from posterior MMA) dAVF Cognard type 3. Drainage into multiple cortical veins. Right CFA closure with Angio-Seal.    Primary Surgeon:  Raffy Thompson MD  Secondary Surgeon:  Not applicable  Secondary Surgeon Review:  None  Fellow:  None  Additional Assistants:  None    Prior to the start of the procedure and with procedural staff participation, I verbally confirmed: the patient s identity using two indicators, relevant allergies, that the procedure was appropriate and matched the consent or emergent situation, and that the correct equipment/implants were available. Immediately prior to starting the procedure I conducted the Time Out with the procedural staff and re-confirmed the patient s name, procedure, and site/side. (The Joint Commission universal protocol was followed.)  Yes    PRU value: Not applicable    Anesthesia:  Conscious Sedation  Medications:  Heparin, Lidocaine  Puncture site:  Right Femoral Artery    Fluoroscopy time (minutes):  10.0   Radiation dose (mGy):  1960.53    Contrast amount (mL):  96     Estimated blood loss (mL):  Minimal    Closure:  Device      Sedation Post-Procedure Summary    Sedatives: Fentanyl and Midazolam (Versed)    Vital signs and pulse oximetry were monitored and remained stable throughout the procedure, and sedation was maintained until the procedure was complete.  The patient was monitored by staff until sedation discharge criteria were met.    Patient tolerance:  Patient tolerated the procedure well with no immediate complications.    Time of sedation in minutes:  44 minutes from beginning to end of physician one to one monitoring.    Raffy Thompson  Pager: [phone]

## 2019-04-24 NOTE — PRE-PROCEDURE
Endovascular Surgical Neuroradiology Pre-Procedure Note      Reason for procedure: Left parietal intracerebral hemorrhage    HPI:     Patient presents with new onset seizure/AMS and found to have left parietal-occipital intracerebral hemorrhage. MRI brain and CTA was unrevealing as to etiology of bleed.        History and physical reviewed and no updates needed.     Past Medical History:   Diagnosis Date     Gastro-oesophageal reflux disease      Hypertension      Urinary retention      Past Surgical History:   Procedure Laterality Date     CHOLECYSTECTOMY 2000     URETHROPLASTY WITH BUCCAL GRAFT  6/12/2013    Procedure: URETHROPLASTY WITH BUCCAL GRAFT;  Anterior Urethroplasty with Buccal Graft ;  Surgeon: Dmitry Moore MD;  Location: [facility] OR       Imaging:   MRI brain/CTA brain reviewed.    Medications:    Current Facility-Administered Medications:      0.9% sodium chloride BOLUS, 250 mL, Intravenous, Once PRN, Orville Sorensen MD     amLODIPine (NORVASC) tablet 5 mg, 5 mg, Oral, Daily, Orville Sorensen MD, 5 mg at 04/24/19 0810     glucose gel 15-30 g, 15-30 g, Oral, Q15 Min PRN **OR** dextrose 50 % injection 25-50 mL, 25-50 mL, Intravenous, Q15 Min PRN **OR** glucagon injection 1 mg, 1 mg, Subcutaneous, Q15 Min PRN, Raffy Thompson MD     hydrALAZINE (APRESOLINE) injection 10-20 mg, 10-20 mg, Intravenous, Q4H PRN, Orville Sorensen MD     HYDROmorphone (PF) (DILAUDID) injection 0.3-0.5 mg, 0.3-0.5 mg, Intravenous, Q2H PRN, Orville Sorensen MD, 0.3 mg at 04/24/19 1027     hypromellose-dextran (ARTIFICAL TEARS) 0.1-0.3 % ophthalmic solution 2 drop, 2 drop, Ophthalmic, Q1H PRN, Orville Sorensen MD     labetalol (NORMODYNE/TRANDATE) syringe 10-20 mg, 10-20 mg, Intravenous, Q10 Min PRN, Orville Sorensen MD, 10 mg at 04/23/19 2316     lactated ringers infusion, , Intravenous, Continuous, Orville Sorensen MD, Last Rate: 100 mL/hr at 04/24/19  "0845     levETIRAcetam (KEPPRA) intermittent infusion 500 mg, 500 mg, Intravenous, Q12H, Orville Sorensen MD, 500 mg at 04/23/19 2243     lidocaine (LMX4) cream, , Topical, Q1H PRN, Raffy Thompson MD     lidocaine (XYLOCAINE) 2 % topical gel, , , ,      lidocaine 1 % 1 mL, 1 mL, Other, Q1H PRN, Raffy Thompson MD     lidocaine 1 % injection, , , ,      medication instruction, , Does not apply, Continuous PRN, Raffy Thompson MD     Medication Instructions: No D5W IV solutions unless patient hypoglycemic., , Does not apply, Continuous PRN, Orville Sorensen MD     metoclopramide (REGLAN) tablet 10 mg, 10 mg, Oral, Q6H PRN **OR** metoclopramide (REGLAN) injection 10 mg, 10 mg, Intravenous, Q6H PRN, Orville Sorensen MD     naloxone (NARCAN) injection 0.1-0.4 mg, 0.1-0.4 mg, Intravenous, Q2 Min PRN, Orville Sorensen MD     OLANZapine (zyPREXA) injection 10 mg, 10 mg, Intramuscular, Daily PRN, Orville Sorensen MD, 10 mg at 04/22/19 1055     ondansetron (ZOFRAN-ODT) ODT tab 4 mg, 4 mg, Oral, Q6H PRN **OR** ondansetron (ZOFRAN) injection 4 mg, 4 mg, Intravenous, Q6H PRN, Orville Sorensen MD     prochlorperazine (COMPAZINE) injection 10 mg, 10 mg, Intravenous, Q6H PRN **OR** prochlorperazine (COMPAZINE) tablet 10 mg, 10 mg, Oral, Q6H PRN **OR** prochlorperazine (COMPAZINE) Suppository 25 mg, 25 mg, Rectal, Q12H PRN, Orville Sorensen MD     sodium chloride (PF) 0.9% PF flush 3 mL, 3 mL, Intracatheter, Q1H PRN, Raffy Thompson MD     sodium chloride (PF) 0.9% PF flush 3 mL, 3 mL, Intracatheter, Q8H, Raffy Thompson MD, 3 mL at 04/24/19 1027     sodium chloride 0.9% infusion, , Intravenous, Continuous, Raffy Thompson MD, Last Rate: 100 mL/hr at 04/24/19 1032    Allergies:   No Known Allergies    Pre-Procedure Assessment done at 1030.  BP (!) 152/93 (BP Location: Left arm)   Pulse 81   Temp 98.7  F (37.1  C) (Oral)   Resp 16   Ht 1.778 m (5' 10\")   Wt 97.9 kg (215 lb 12.8 " oz)   SpO2 98%   BMI 30.96 kg/m      Heart: Normal heart sounds and rate  Lungs: Lungs Clear with good breath sounds bilaterally.  Mallampati:  Grade 2:  Soft palate, base of uvula, tonsillar pillars, and portion of posterior pharyngeal wall visible  ASA Class:  Class 2 - MILD SYSTEMIC DISEASE, NO ACUTE PROBLEMS, NO FUNCTIONAL LIMITATIONS.  Abnormal reaction to sedation in the past: No   Expected Level:  Moderate Sedation  PO Intake:  Appropriately NPO for procedure  Sleep apnea: No    Labs:  Lab Results   Component Value Date    HGB 15.5 04/24/2019     Lab Results   Component Value Date     04/24/2019     Recent Labs   Lab Test 04/24/19  0737 04/23/19  0459   CR 0.94 0.80     Lab Results   Component Value Date    INR 1.17 04/22/2019    INR 1.01 04/30/2013       Impression:     Left parietal-occipital ICH    Plan:     - Diagnostic cerebral angiogram    Cerebral angiogram procedure its risks, benefits, and alternatives including but not limited to stroke, bleeding, renal failure, contrast dye or medication reaction, vessel injury, infection were discussed with the patient's wife. Questions answered and the patient's wife gives written and verbal consent to proceed.      Raffy Thompson

## 2019-04-24 NOTE — PLAN OF CARE
"PT: PT orders received, chart reviewed, discussed with pt and RN. Noting pt s/p cerebral angiogram today, has completed his 2 hours flat bedrest; orders for \"rest remainder of day, BRP with assistance\". Discussed with pt and offered to dangle EOB or stand at bedside, pt politely declines requesting to continue to rest. RN updated.  "

## 2019-04-24 NOTE — PLAN OF CARE
OT: Attempted to see pt for initial OT eval. Per nursing, pt not appropriate to be seen this am due to bedrest orders and procedure this am.

## 2019-04-24 NOTE — PLAN OF CARE
VSS. Tele SR. Pt forgetful at times, oriented x2. Able to move all extremities purposefully and following commands. Other neuros unchanged from previous. Continues to report back pain, given dilaudid with some relief. Pt sent to MRI with flyer; report given to 73 for transfer. All belongings sent to 73.

## 2019-04-24 NOTE — PROGRESS NOTES
Vascular Neurology Progress Note      Chief complaint: Altered Mental Status (pt found unresponsive in car, became confused flailing around and screaming non sensical)      Overnight events: Went for cerebral angio this morning, dAVF found      Impression:  Intracerebral Hemorrhage L parietal and occipital spontaneous intraparenchymal hemorrhage due to left occipital dural AVF, ICH score 0, 17cc, bleed day #3  Cerebral edema  Seizure on presentation - has R tongue bite  Agitation due to postictal state - resolved    Recommendations:  Acute Hemorrhagic Stroke Recommendations  - Neurochecks q4hr  - dAVF embolization Friday  - Seizure prophylaxis with Keppra 500mg q12 hours for at least a month. Can switch to po when taking po consistently.  - Systolic BP Goal: < 160  - Maintain normoglycemia, normothermia, normonatremia  - PT/OT/SLP  - Stroke Education        Patient follow up:  - final recommendation pending work-up    Shayna Koch PA-C  Neurology  04/24/2019 4:02 PM  Pager: [phone]    ____________________________________________________________________      Medications    Scheduled medications    amLODIPine  5 mg Oral Daily     levETIRAcetam  500 mg Intravenous Q12H     sodium chloride (PF)  3 mL Intracatheter Q8H       Infusion medications    lactated ringers 100 mL/hr at 04/24/19 3015     - MEDICATION INSTRUCTIONS -       - MEDICATION INSTRUCTIONS -       sodium chloride 100 mL/hr at 04/24/19 1032       PRN medications  Current Facility-Administered Medications   Medication Dose Route Frequency     sodium chloride 0.9%  250 mL Intravenous Once PRN     acetaminophen  500 mg Oral Q6H PRN     glucose  15-30 g Oral Q15 Min PRN    Or     dextrose  25-50 mL Intravenous Q15 Min PRN    Or     glucagon  1 mg Subcutaneous Q15 Min PRN     flumazenil  0.2 mg Intravenous q1 min prn     hydrALAZINE  10-20 mg Intravenous Q4H PRN     HYDROmorphone  0.3-0.5 mg Intravenous Q2H PRN     hypromellose-dextran  2 drop  Ophthalmic Q1H PRN     labetalol  10-20 mg Intravenous Q10 Min PRN     lidocaine 4%   Topical Q1H PRN     lidocaine (buffered or not buffered)  1 mL Other Q1H PRN     - MEDICATION INSTRUCTIONS -   Does not apply Continuous PRN     - MEDICATION INSTRUCTIONS -   Does not apply Continuous PRN     metoclopramide  10 mg Oral Q6H PRN    Or     metoclopramide  10 mg Intravenous Q6H PRN     naloxone  0.1-0.4 mg Intravenous Q2 Min PRN     OLANZapine  10 mg Intramuscular Daily PRN     ondansetron  4 mg Oral Q6H PRN    Or     ondansetron  4 mg Intravenous Q6H PRN     prochlorperazine  10 mg Intravenous Q6H PRN    Or     prochlorperazine  10 mg Oral Q6H PRN    Or     prochlorperazine  25 mg Rectal Q12H PRN     sodium chloride (PF)  3 mL Intracatheter Q1H PRN       Allergies  No Known Allergies      Physical Examination    Vital Signs:  Temp:  [98.4  F (36.9  C)-100  F (37.8  C)] (P) 98.8  F (37.1  C)  Pulse:  [67-96] 88  Heart Rate:  [78-92] 80  Resp:  [11-29] (P) 18  BP: (117-165)/() 117/77  SpO2:  [92 %-99 %] 94 %     General: Lying in bed in NAD   Heent: Has R side tongue bite consistent with seizure    Neurologic  Mental Status:  follows commands, oriented to self, place, situation  Cranial Nerves:  PERRL, facial movements symmetric, hearing not formally tested but intact to conversation, shoulder shrug strong bilaterally  Motor:  5/5 in arms, leg no tested due to straight leg after angio  Reflexes:  deferred  Sensory:  intacto to light touch throughout  Coordination:  no obvious upper extremity ataxia  Station/Gait:  deferred due to acute illness      Labs/Studies:  CBC:     Recent Labs   Lab 04/24/19  1025 04/24/19  0737 04/23/19  0459   WBC 6.5 7.7 7.0   RBC 5.15 5.09 4.94   HGB 15.5 15.4 15.1   HCT 44.9 44.5 42.8    156 140*     Basic Metabolic Panel:   Recent Labs   Lab 04/24/19  1025 04/24/19  0737 04/23/19  0459 04/22/19  1050   NA  --  140 144 144   POTASSIUM  --  3.7 3.8 3.8   CHLORIDE  --  107 111*  106   CO2  --  28 28 14*   BUN  --  9 11 20   CR 0.91 0.94 0.80 1.10   GLC  --  110* 117* 178*   ALAN  --  9.0 8.8 9.5     Liver panel:  Recent Labs   Lab Test 04/22/19  1050   PROTTOTAL 8.5   ALBUMIN 4.1   BILITOTAL 0.4   ALKPHOS 101   AST 25   ALT 43     INR:  Recent Labs   Lab Test 04/24/19  1025 04/22/19  1050 04/30/13  1332   INR 1.04 1.17* 1.01      Lipid Profile:  Recent Labs   Lab Test 04/24/19  1025   CHOL 116   HDL 49   LDL 50   TRIG 84     A1C:   Recent Labs   Lab Test 04/24/19  1025   A1C 6.3*     Troponin I: No results for input(s): TROPI in the last 168 hours.      Imaging:  I personally reviewed all of the following studies:    MRI brain:  IMPRESSION:    1. Parenchymal hemorrhage centered in the left parietal/occipital  region is again seen and not significantly changed size. No obvious  underlying mass is appreciated although evaluation is limited given  the intrinsic T1 hyperintensity of the hemorrhage.  2. Unchanged mass effect on the occipital horn of the left lateral  ventricle and 5 mm rightward midline shift.  3. Prominent enhancement of sulcal vessels in the left parietal and  occipital lobes which may be due to vascular congestion. There may  also be a developmental venous anomaly present. Of note, developmental  venous anomalies commonly occur with cavernous malformations.    Conventional cerebral angio:  Findings:  Left occipital (possibly minor contribution from posterior MMA) dAVF Cognard type 3. Drainage into multiple cortical veins.

## 2019-04-24 NOTE — PLAN OF CARE
Pt here with ICH. Disoriented to time. Forgetful. Neuros intact. VSS ex IV labetelol given x1 for SBP > 140. On seizure precautions, no seizure activity noted.Did not ambulate OOB this shift. Pt voiding 100-200mL at a time, bladder scan 530mL, straight cath for 620mL, refused bladder scan this AM, incontinent of urine at times. Reports back pain w/ movement, ice applied, IV dilaudid given X1 w/ relief. Tele NSR. NPO at midnight for cerebral angiogram today.

## 2019-04-24 NOTE — PROGRESS NOTES
[facility]    Medicine Progress Note - Hospitalist Service       Date of Admission:  4/22/2019  Assessment & Plan   Apolinar Mcgregor is a 58 year old male with a history of HTN who presented to the ED with altered mental status and was found to have an acute parenchymal hemorrhage      Acute parenchymal hemorrhage  AMS due to above.  Resolved   Patient initially presented with AMS.  Found to have an acute parenchymal hemorrhage in the left parietal and occipital lobs measuring up to 4.7 cm with 4 mm rightward midline shift.  Wife does not know of any traumatic head injuries.  Does have a history of HTN but blood pressure well controlled on presentation.  UDS negative.  ED did speak with neurosurgery who recommended admission to ICU and continued monitoring  Repeat CT head showed stable hemorrhage   MRI brain on 4/23 showed the hemorrhage and mass effect were stable and without any obvious masses or abnormalities  - Neuro checks q2h   - Goal SBP <140 with PRN Labetalol and Hydralazine  - PT/OT/Speech consulted   - Head of bed at 30 degrees  - Neurosurgery and neurology consulted.  Appreciate their assistance.  No plans for surgical intervention at this time.  Underwent cerebral angiogram today and results pending     Anion gap metabolic acidosis. Resolved   Bicarb 14 and anion gap 24.  Unclear etiology at this time.  Given the bleed it is possible the patient had a seizure and this could have elevated his lactate.      H/o HTN   Blood pressures well controlled at this time without intervention  - Restarted PTA Norvasc       Diet: Room Service  Combination Diet Dysphagia Diet Level 2: Mechan Altered; Thin Liquids (water, ice chips, juice, milk gelatin, ice cream, etc)  NPO per Anesthesia Guidelines for Procedure/Surgery Except for: Meds    DVT Prophylaxis: Pneumatic Compression Devices  Hinson Catheter: in place, indication:    Code Status: Full Code      Disposition Plan   Expected discharge: 2 - 3  days, recommended to TCU vs home once neurologic work up completed.  Entered: Bijan Hernandez DO 04/24/2019, 1:42 PM       The patient's care was discussed with the Bedside Nurse, Patient and Patient's Family.    Bijan Hernandez DO  Hospitalist Service  [facility]    ______________________________________________________________________    Interval History   Patient seen and examined.  No complaints at this time.  No pain or difficulty breathing.  No fevers.  No new neurologic symptoms.     Data reviewed today: I reviewed all medications, new labs and imaging results over the last 24 hours. I personally reviewed no images or EKG's today.    Physical Exam   Vital Signs: Temp: 98.7  F (37.1  C) Temp src: Oral BP: (!) 130/91 Pulse: 83 Heart Rate: 90 Resp: 16 SpO2: 97 % O2 Device: None (Room air) Oxygen Delivery: 2 LPM  Weight: 215 lbs 12.8 oz  General Appearance: Resting comfortably.  NAD   Respiratory: Clear to auscultation.  No respiratory distress  Cardiovascular: RRR.  No murmurs  GI: Bowel sounds present.  No tenderness  Skin: No rashes  Other: No edema    Data   Recent Labs   Lab 04/24/19  1025 04/24/19  0737 04/23/19  0459 04/22/19  1050   WBC 6.5 7.7 7.0 14.1*   HGB 15.5 15.4 15.1 16.8   MCV 87 87 87 91    156 140* 191   INR 1.04  --   --  1.17*   NA  --  140 144 144   POTASSIUM  --  3.7 3.8 3.8   CHLORIDE  --  107 111* 106   CO2  --  28 28 14*   BUN  --  9 11 20   CR 0.91 0.94 0.80 1.10   ANIONGAP  --  5 5 24*   ALAN  --  9.0 8.8 9.5   GLC  --  110* 117* 178*   ALBUMIN  --   --   --  4.1   PROTTOTAL  --   --   --  8.5   BILITOTAL  --   --   --  0.4   ALKPHOS  --   --   --  101   ALT  --   --   --  43   AST  --   --   --  25     Recent Results (from the past 24 hour(s))   MR Brain w/o & w Contrast    Narrative    MRI BRAIN WITHOUT AND WITH CONTRAST  4/23/2019 10:01 PM     HISTORY:  Followup ICH, question underlying lesion.    TECHNIQUE:  Multiplanar, multisequence MRI of the brain  without and  with 10 mL Gadavist.     COMPARISON: Head CT 4/22/2019.     FINDINGS: Parenchymal hemorrhage centered in the left parietal  occipital region is again seen. The hemorrhage is heterogeneous on T1  with both hypo- and hyperintense components but predominantly T2  hypointense. Moderate surrounding T2 hyperintense edema. There is  persistent mass effect on the occipital horn of the left lateral  ventricle without evidence of hydrocephalus or ventricular entrapment.  5 mm of rightward midline shift is unchanged.    No other significant parenchymal signal abnormality. No restricted  diffusion to suggest infarct.    Evaluation for underlying enhancing mass is limited given the  intrinsic T1 hyperintensity of the hemorrhage.    There are prominent enhancing vessels in the sulci of the left  posterior parietal and occipital lobes which could be due to vascular  congestion.      Impression    IMPRESSION:    1. Parenchymal hemorrhage centered in the left parietal/occipital  region is again seen and not significantly changed size. No obvious  underlying mass is appreciated although evaluation is limited given  the intrinsic T1 hyperintensity of the hemorrhage.  2. Unchanged mass effect on the occipital horn of the left lateral  ventricle and 5 mm rightward midline shift.  3. Prominent enhancement of sulcal vessels in the left parietal and  occipital lobes which may be due to vascular congestion. There may  also be a developmental venous anomaly present. Of note, developmental  venous anomalies commonly occur with cavernous malformations.      GIA STRATTON MD

## 2019-04-24 NOTE — PROGRESS NOTES
Attempted to see patient today. He was on his way to IR for cerebral angiogram. No surgical intervention recommended. Continue plan per hospitalist and neurology.

## 2019-04-25 ENCOUNTER — APPOINTMENT (OUTPATIENT)
Dept: OCCUPATIONAL THERAPY | Facility: CLINIC | Age: 59
DRG: 020 | End: 2019-04-25
Attending: INTERNAL MEDICINE
Payer: COMMERCIAL

## 2019-04-25 ENCOUNTER — APPOINTMENT (OUTPATIENT)
Dept: PHYSICAL THERAPY | Facility: CLINIC | Age: 59
DRG: 020 | End: 2019-04-25
Attending: INTERNAL MEDICINE
Payer: COMMERCIAL

## 2019-04-25 LAB
ANION GAP SERPL CALCULATED.3IONS-SCNC: 8 MMOL/L (ref 3–14)
BUN SERPL-MCNC: 11 MG/DL (ref 7–30)
CALCIUM SERPL-MCNC: 8.9 MG/DL (ref 8.5–10.1)
CHLORIDE SERPL-SCNC: 109 MMOL/L (ref 94–109)
CO2 SERPL-SCNC: 24 MMOL/L (ref 20–32)
CREAT SERPL-MCNC: 0.9 MG/DL (ref 0.66–1.25)
ERYTHROCYTE [DISTWIDTH] IN BLOOD BY AUTOMATED COUNT: 14 % (ref 10–15)
GFR SERPL CREATININE-BSD FRML MDRD: >90 ML/MIN/{1.73_M2}
GLUCOSE SERPL-MCNC: 114 MG/DL (ref 70–99)
HCT VFR BLD AUTO: 43.5 % (ref 40–53)
HGB BLD-MCNC: 15 G/DL (ref 13.3–17.7)
MCH RBC QN AUTO: 29.9 PG (ref 26.5–33)
MCHC RBC AUTO-ENTMCNC: 34.5 G/DL (ref 31.5–36.5)
MCV RBC AUTO: 87 FL (ref 78–100)
PLATELET # BLD AUTO: 136 10E9/L (ref 150–450)
POTASSIUM SERPL-SCNC: 3.9 MMOL/L (ref 3.4–5.3)
RBC # BLD AUTO: 5.02 10E12/L (ref 4.4–5.9)
SODIUM SERPL-SCNC: 141 MMOL/L (ref 133–144)
WBC # BLD AUTO: 6 10E9/L (ref 4–11)

## 2019-04-25 PROCEDURE — 80048 BASIC METABOLIC PNL TOTAL CA: CPT | Performed by: INTERNAL MEDICINE

## 2019-04-25 PROCEDURE — 25000132 ZZH RX MED GY IP 250 OP 250 PS 637: Performed by: INTERNAL MEDICINE

## 2019-04-25 PROCEDURE — 97166 OT EVAL MOD COMPLEX 45 MIN: CPT | Mod: GO

## 2019-04-25 PROCEDURE — 97530 THERAPEUTIC ACTIVITIES: CPT | Mod: GP | Performed by: PHYSICAL THERAPIST

## 2019-04-25 PROCEDURE — 25000132 ZZH RX MED GY IP 250 OP 250 PS 637: Performed by: PSYCHIATRY & NEUROLOGY

## 2019-04-25 PROCEDURE — 99232 SBSQ HOSP IP/OBS MODERATE 35: CPT | Performed by: PSYCHIATRY & NEUROLOGY

## 2019-04-25 PROCEDURE — 97535 SELF CARE MNGMENT TRAINING: CPT | Mod: GO

## 2019-04-25 PROCEDURE — 97116 GAIT TRAINING THERAPY: CPT | Mod: GP | Performed by: PHYSICAL THERAPIST

## 2019-04-25 PROCEDURE — 85027 COMPLETE CBC AUTOMATED: CPT | Performed by: INTERNAL MEDICINE

## 2019-04-25 PROCEDURE — 25000132 ZZH RX MED GY IP 250 OP 250 PS 637: Performed by: PHYSICIAN ASSISTANT

## 2019-04-25 PROCEDURE — 97162 PT EVAL MOD COMPLEX 30 MIN: CPT | Mod: GP | Performed by: PHYSICAL THERAPIST

## 2019-04-25 PROCEDURE — 97530 THERAPEUTIC ACTIVITIES: CPT | Mod: GO

## 2019-04-25 PROCEDURE — 36415 COLL VENOUS BLD VENIPUNCTURE: CPT | Performed by: INTERNAL MEDICINE

## 2019-04-25 PROCEDURE — 99232 SBSQ HOSP IP/OBS MODERATE 35: CPT | Performed by: INTERNAL MEDICINE

## 2019-04-25 PROCEDURE — 12000000 ZZH R&B MED SURG/OB

## 2019-04-25 PROCEDURE — 25000128 H RX IP 250 OP 636: Performed by: INTERNAL MEDICINE

## 2019-04-25 PROCEDURE — 25800030 ZZH RX IP 258 OP 636: Performed by: PSYCHIATRY & NEUROLOGY

## 2019-04-25 RX ORDER — ACETAMINOPHEN 650 MG/1
650 SUPPOSITORY RECTAL EVERY 4 HOURS PRN
Status: DISCONTINUED | OUTPATIENT
Start: 2019-04-25 | End: 2019-04-29 | Stop reason: HOSPADM

## 2019-04-25 RX ORDER — CYCLOBENZAPRINE HCL 10 MG
10 TABLET ORAL 3 TIMES DAILY
Status: DISCONTINUED | OUTPATIENT
Start: 2019-04-25 | End: 2019-04-29 | Stop reason: HOSPADM

## 2019-04-25 RX ORDER — AMOXICILLIN 250 MG
1-2 CAPSULE ORAL 2 TIMES DAILY PRN
Status: DISCONTINUED | OUTPATIENT
Start: 2019-04-25 | End: 2019-04-29 | Stop reason: HOSPADM

## 2019-04-25 RX ORDER — LIDOCAINE 4 G/G
1 PATCH TOPICAL
Status: DISCONTINUED | OUTPATIENT
Start: 2019-04-25 | End: 2019-04-29 | Stop reason: HOSPADM

## 2019-04-25 RX ORDER — BISACODYL 10 MG
10 SUPPOSITORY, RECTAL RECTAL DAILY PRN
Status: DISCONTINUED | OUTPATIENT
Start: 2019-04-25 | End: 2019-04-29 | Stop reason: HOSPADM

## 2019-04-25 RX ORDER — ACETAMINOPHEN 325 MG/1
650 TABLET ORAL EVERY 4 HOURS PRN
Status: DISCONTINUED | OUTPATIENT
Start: 2019-04-25 | End: 2019-04-29 | Stop reason: HOSPADM

## 2019-04-25 RX ADMIN — CYCLOBENZAPRINE HYDROCHLORIDE 10 MG: 10 TABLET, FILM COATED ORAL at 21:12

## 2019-04-25 RX ADMIN — CYCLOBENZAPRINE HYDROCHLORIDE 10 MG: 10 TABLET, FILM COATED ORAL at 16:01

## 2019-04-25 RX ADMIN — BISACODYL 10 MG: 10 SUPPOSITORY RECTAL at 14:46

## 2019-04-25 RX ADMIN — ACETAMINOPHEN 500 MG: 500 TABLET, FILM COATED ORAL at 00:35

## 2019-04-25 RX ADMIN — LEVETIRACETAM 500 MG: 5 INJECTION INTRAVENOUS at 21:12

## 2019-04-25 RX ADMIN — ACETAMINOPHEN 500 MG: 500 TABLET, FILM COATED ORAL at 08:58

## 2019-04-25 RX ADMIN — CYCLOBENZAPRINE HYDROCHLORIDE 10 MG: 10 TABLET, FILM COATED ORAL at 10:58

## 2019-04-25 RX ADMIN — ACETAMINOPHEN 650 MG: 325 TABLET, FILM COATED ORAL at 21:38

## 2019-04-25 RX ADMIN — SENNOSIDES AND DOCUSATE SODIUM 2 TABLET: 8.6; 5 TABLET ORAL at 10:58

## 2019-04-25 RX ADMIN — SODIUM CHLORIDE: 9 INJECTION, SOLUTION INTRAVENOUS at 03:47

## 2019-04-25 RX ADMIN — LIDOCAINE 1 PATCH: 560 PATCH PERCUTANEOUS; TOPICAL; TRANSDERMAL at 04:45

## 2019-04-25 RX ADMIN — ACETAMINOPHEN 500 MG: 500 TABLET, FILM COATED ORAL at 16:02

## 2019-04-25 RX ADMIN — AMLODIPINE BESYLATE 5 MG: 5 TABLET ORAL at 08:58

## 2019-04-25 RX ADMIN — LEVETIRACETAM 500 MG: 5 INJECTION INTRAVENOUS at 10:58

## 2019-04-25 ASSESSMENT — ACTIVITIES OF DAILY LIVING (ADL)
ADLS_ACUITY_SCORE: 16

## 2019-04-25 NOTE — PROVIDER NOTIFICATION
Brief update:    Page regarding low back pain.  Patient attributes this to uncomfortable bed.  Nursing requests attempt with lidocaine patch    Lidocaine patch ordered    Bandar Story MD  1:15 AM

## 2019-04-25 NOTE — PROGRESS NOTES
04/25/19 1333   Quick Adds   Type of Visit Initial Occupational Therapy Evaluation   Living Environment   Lives With spouse   Living Arrangements house   Home Accessibility stairs to enter home;stairs within home   Number of Stairs, Main Entrance 2   Number of Stairs, Within Home, Primary other (see comments)  (13)   Stair Railings, Within Home, Primary railing on left side (ascending)   Transportation Anticipated car, drives self   Self-Care   Usual Activity Tolerance good   Current Activity Tolerance fair   Regular Exercise Yes   Activity/Exercise Type walking   Exercise Amount/Frequency 3-5 times/wk   Equipment Currently Used at Home none   Functional Level   Ambulation 0-->independent   Transferring 0-->independent   Toileting 0-->independent   Bathing 0-->independent   Dressing 0-->independent   Eating 0-->independent   Communication 0-->understands/communicates without difficulty   Swallowing 0-->swallows foods/liquids without difficulty   Cognition 0 - no cognition issues reported   Fall history within last six months no   Which of the above functional risks had a recent onset or change? ambulation;transferring;toileting;bathing;dressing   General Information   Onset of Illness/Injury or Date of Surgery - Date 04/22/19   Referring Physician Orville Sorensen MD   Patient/Family Goals Statement Return Home   Additional Occupational Profile Info/Pertinent History of Current Problem Apolinar Mcgregor is a 58 year old male with a history of HTN who presented to the ED with altered mental status and was found to have an acute parenchymal hemorrhage d/t AVF   Precautions/Limitations fall precautions;seizure precautions   Cognitive Status Examination   Orientation orientation to person, place and time   Level of Consciousness alert   Follows Commands (Cognition) WNL   Memory intact   Attention No deficits were identified   Organization/Problem Solving Problem solving impaired;Sequencing impaired;Reports  problems with problem solving during evaluation   Executive Function Planning ability impaired;Impulsive   Visual Perception   Visual Perception Wears glasses   Sensory Examination   Sensory Quick Adds No deficits were identified   Pain Assessment   Patient Currently in Pain No   Integumentary/Edema   Integumentary/Edema no deficits were identifed   Posture   Posture forward head position   Range of Motion (ROM)   ROM Quick Adds No deficits were identified   Strength   Manual Muscle Testing Quick Adds No deficits were identified   Hand Strength   Hand Strength Comments No defecits were identified   Muscle Tone Assessment   Muscle Tone Quick Adds No deficits were identified   Mobility   Bed Mobility Bed mobility skill: Sit to supine;Bed mobility skill: Supine to sit   Bed Mobility Skill: Sit to Supine   Level of [place]: Sit/Supine minimum assist (75% patients effort)   Physical Assist/Nonphysical Assist: Sit/Supine 1 person + 1 person to manage equipment;verbal cues;nonverbal cues (demo/gestures)   Assistive Device: Sit/Supine bedrail   Bed Mobility Skill: Supine to Sit   Level of [place]: Supine/Sit moderate assist (50% patients effort)   Physical Assist/Nonphysical Assist: Supine/Sit 1 person + 1 person to manage equipment;verbal cues;nonverbal cues (demo/gestures)   Assistive Device: Supine/Sit bedrail   Transfer Skill: Bed to Chair/Chair to Bed   Level of [place]: Bed to Chair minimum assist (75% patients effort)   Physical Assist/Nonphysical Assist: Bed to Chair 2 persons;1 person + 1 person to manage equipment;verbal cues;nonverbal cues (demo/gestures)   Assistive Device - Transfer Skill Bed to Chair Chair to Bed Rehab Eval standard walker   Transfer Skill: Sit to Stand   Level of [place]: Sit/Stand moderate assist (50% patients effort)   Physical Assist/Nonphysical Assist: Sit/Stand 2 persons;1 person + 1 person to manage equipment;verbal cues;nonverbal cues (demo/gestures)   Assistive  "Device for Transfer: Sit/Stand standard walker   Tub/Shower Transfer   Tub/Shower Transfer Comments tub shower w doors   Instrumental Activities of Daily Living (IADL)   Previous Responsibilities finances;yardwork;driving;housekeeping;medication management   IADL Comments shares some responsibilities w spouse   Activities of Daily Living Analysis   Impairments Contributing to Impaired Activities of Daily Living cognition impaired;balance impaired;motor control impaired;pain;postural control impaired;strength decreased   General Therapy Interventions   Planned Therapy Interventions ADL retraining;IADL retraining;cognition;neuromuscular re-education;strengthening;home program guidelines;progressive activity/exercise   Clinical Impression   Criteria for Skilled Therapeutic Interventions Met yes, treatment indicated   OT Diagnosis ADL, IADL, cognition and mobility impaired   Influenced by the following impairments cognition, pain, strength, mobility   Assessment of Occupational Performance 3-5 Performance Deficits   Identified Performance Deficits ADL, IADL, cognition and mobility impaired   Clinical Decision Making (Complexity) Moderate complexity   Therapy Frequency daily   Predicted Duration of Therapy Intervention (days/wks) one week   Anticipated Discharge Disposition Acute Rehabilitation Facility   Risks and Benefits of Treatment have been explained. Yes   Patient, Family & other staff in agreement with plan of care Yes   [facility] TM \"6 Clicks\"   2016, Trustees of [facility], under license to [employer].  All rights reserved.   6 Clicks Short Forms Daily Activity Inpatient Short Form   [facility]  \"6 Clicks\" Daily Activity Inpatient Short Form   1. Putting on and taking off regular lower body clothing? 2 - A Lot   2. Bathing (including washing, rinsing, drying)? 2 - A Lot   3. Toileting, which includes using toilet, bedpan or urinal? 2 - A Lot   4. Putting on and taking off " regular upper body clothing? 2 - A Lot   5. Taking care of personal grooming such as brushing teeth? 2 - A Lot   6. Eating meals? 3 - A Little   Daily Activity Raw Score (Score out of 24.Lower scores equate to lower levels of function) 13   Total Evaluation Time   Total Evaluation Time (Minutes) 15

## 2019-04-25 NOTE — PROGRESS NOTES
Vascular Neurology Progress Note      Chief complaint: Altered Mental Status (pt found unresponsive in car, became confused flailing around and screaming non sensical)      Overnight events: Has abdominal pain, constipation. No headache.      Impression:  Intracerebral Hemorrhage L parietal and occipital spontaneous intraparenchymal hemorrhage due to left occipital dural AVF, ICH score 0, 17cc, bleed day #4  Cerebral edema- improved  Seizure on presentation   Agitation due to postictal state - resolved    Recommendations:  Acute Hemorrhagic Stroke Recommendations  - Neurochecks q4hr  - dAVF embolization tomorrow, npo after MN  - Seizure prophylaxis with Keppra 500mg q12 hours for at least a month. Can switch to po when taking po consistently.  - Systolic BP Goal: < 160  - Maintain normoglycemia, normothermia, normonatremia  - PT/OT/SLP  - Stroke Education        Patient follow up:  - final recommendation pending work-up    Shayna Koch PA-C  Neurology  04/25/2019 4:43 PM  Pager: [phone]    ____________________________________________________________________      Medications    Scheduled medications    amLODIPine  5 mg Oral Daily     cyclobenzaprine  10 mg Oral TID     levETIRAcetam  500 mg Intravenous Q12H     lidocaine  1 patch Transdermal Q24h    And     [START ON 4/26/2019] lidocaine   Transdermal Q24H    And     lidocaine   Transdermal Q8H     sodium chloride (PF)  3 mL Intracatheter Q8H       Infusion medications    - MEDICATION INSTRUCTIONS -       - MEDICATION INSTRUCTIONS -         PRN medications  Current Facility-Administered Medications   Medication Dose Route Frequency     sodium chloride 0.9%  250 mL Intravenous Once PRN     bisacodyl  10 mg Rectal Daily PRN     glucose  15-30 g Oral Q15 Min PRN    Or     dextrose  25-50 mL Intravenous Q15 Min PRN    Or     glucagon  1 mg Subcutaneous Q15 Min PRN     hydrALAZINE  10-20 mg Intravenous Q4H PRN     HYDROmorphone  0.3-0.5 mg Intravenous Q2H PRN      hypromellose-dextran  2 drop Ophthalmic Q1H PRN     labetalol  10-20 mg Intravenous Q10 Min PRN     lidocaine 4%   Topical Q1H PRN     lidocaine (buffered or not buffered)  1 mL Other Q1H PRN     - MEDICATION INSTRUCTIONS -   Does not apply Continuous PRN     - MEDICATION INSTRUCTIONS -   Does not apply Continuous PRN     metoclopramide  10 mg Oral Q6H PRN    Or     metoclopramide  10 mg Intravenous Q6H PRN     naloxone  0.1-0.4 mg Intravenous Q2 Min PRN     OLANZapine  10 mg Intramuscular Daily PRN     ondansetron  4 mg Oral Q6H PRN    Or     ondansetron  4 mg Intravenous Q6H PRN     prochlorperazine  10 mg Intravenous Q6H PRN    Or     prochlorperazine  10 mg Oral Q6H PRN    Or     prochlorperazine  25 mg Rectal Q12H PRN     senna-docusate  1-2 tablet Oral BID PRN     sodium chloride (PF)  3 mL Intracatheter Q1H PRN       Allergies  No Known Allergies      Physical Examination    Vital Signs:  Temp:  [97.9  F (36.6  C)-98.8  F (37.1  C)] 98.2  F (36.8  C)  Pulse:  [76] 76  Heart Rate:  [74-91] 85  Resp:  [16-18] 16  BP: (124-140)/(77-92) 139/92  SpO2:  [94 %-98 %] 96 %     General: Lying in bed in NAD   Heent: Has R side tongue bite consistent with seizure    Neurologic  Mental Status:  follows commands, oriented to self, place, situation  Cranial Nerves:  PERRL, facial movements symmetric, hearing not formally tested but intact to conversation, shoulder shrug strong bilaterally  Motor:  5/5 throughout  Reflexes:  deferred  Sensory:  intacto to light touch throughout  Coordination:  no obvious upper extremity ataxia, HTS deferred due to abdominal pain  Station/Gait:  deferred due to acute illness      Labs/Studies:  CBC:     Recent Labs   Lab 04/25/19 0718 04/24/19  1025 04/24/19  0737   WBC 6.0 6.5 7.7   RBC 5.02 5.15 5.09   HGB 15.0 15.5 15.4   HCT 43.5 44.9 44.5   * 150 156     Basic Metabolic Panel:   Recent Labs   Lab 04/25/19 0718 04/24/19  1025 04/24/19  0737 04/23/19  0459     --  140 144    POTASSIUM 3.9  --  3.7 3.8   CHLORIDE 109  --  107 111*   CO2 24  --  28 28   BUN 11  --  9 11   CR 0.90 0.91 0.94 0.80   *  --  110* 117*   ALAN 8.9  --  9.0 8.8     Liver panel:  Recent Labs   Lab Test 04/22/19  1050   PROTTOTAL 8.5   ALBUMIN 4.1   BILITOTAL 0.4   ALKPHOS 101   AST 25   ALT 43     INR:  Recent Labs   Lab Test 04/24/19  1025 04/22/19  1050 04/30/13  1332   INR 1.04 1.17* 1.01      Lipid Profile:  Recent Labs   Lab Test 04/24/19  1025   CHOL 116   HDL 49   LDL 50   TRIG 84     A1C:   Recent Labs   Lab Test 04/24/19  1025   A1C 6.3*     Troponin I: No results for input(s): TROPI in the last 168 hours.      Imaging:  I personally reviewed all of the following studies:    MRI brain:  IMPRESSION:    1. Parenchymal hemorrhage centered in the left parietal/occipital  region is again seen and not significantly changed size. No obvious  underlying mass is appreciated although evaluation is limited given  the intrinsic T1 hyperintensity of the hemorrhage.  2. Unchanged mass effect on the occipital horn of the left lateral  ventricle and 5 mm rightward midline shift.  3. Prominent enhancement of sulcal vessels in the left parietal and  occipital lobes which may be due to vascular congestion. There may  also be a developmental venous anomaly present. Of note, developmental  venous anomalies commonly occur with cavernous malformations.    Conventional cerebral angio:  Findings:  Left occipital (possibly minor contribution from posterior MMA) dAVF Cognard type 3. Drainage into multiple cortical veins.

## 2019-04-25 NOTE — PROVIDER NOTIFICATION
"Text page sent to hospitalist: \"Pt c/o of low back pain 10/10 with movement and when sitting up. Reports minimal relief with dilaudid yesterday. Can we try a muscle relaxant? Would you please also order bowel meds? Thank you.\"  "

## 2019-04-25 NOTE — PROGRESS NOTES
04/25/19 1008   Quick Adds   Type of Visit Initial PT Evaluation   Living Environment   Lives With spouse   Living Arrangements house   Home Accessibility stairs to enter home   Transportation Anticipated car, drives self   Self-Care   Usual Activity Tolerance good   Current Activity Tolerance fair   Regular Exercise Yes   Activity/Exercise Type walking   Exercise Amount/Frequency 3-5 times/wk   Equipment Currently Used at Home none   Activity/Exercise/Self-Care Comment independent with ADLs,    Functional Level Prior   Ambulation 0-->independent   Transferring 0-->independent   Toileting 0-->independent   Bathing 0-->independent   Communication 0-->understands/communicates without difficulty   Swallowing 0-->swallows foods/liquids without difficulty   Cognition 0 - no cognition issues reported   Fall history within last six months no   Which of the above functional risks had a recent onset or change? ambulation;transferring   General Information   Onset of Illness/Injury or Date of Surgery - Date 04/22/19   Referring Physician Dr. 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    Precautions/Limitations fall precautions;seizure precautions   Weight-Bearing Status - LLE full weight-bearing   Weight-Bearing Status - RLE full weight-bearing   General Info Comments Pt sitting up with back pain   Cognitive Status Examination   Orientation person;place   Level of Consciousness alert   Follows Commands and Answers Questions 100% of the time;other (see comments)  (delay with multi step instructions)   Personal Safety and Judgment impaired   Cognitive Comment Pt demonstrates decreased motor planning   Pain Assessment   Patient Currently in Pain Yes, see Vital Sign flowsheet  (back pain)    Posture    Posture Comments forward shoulders and head   Range of Motion (ROM)   ROM Comment WFL   Strength   Strength Comments Decreased strength LE compared to UE. Decreased right LE compared to left   Bed Mobility   Bed Mobility Comments sit to supine: educated on via sidelying and pt assisted iwth LE.   Transfer Skills   Transfer Comments sit to stand: modA for cues and instructions and balance in standing.   Gait   Gait Comments Pt ambulated in room with walker and modA and assist of second person for IV pole   Balance   Balance Comments Warren with walker for static standing. Pt has wide MANOJ. ModA with dynamic   Sensory Examination   Sensory Perception no deficits were identified   Coordination   Coordination Comments Decreased LE coordination and decreased motor plannning.Pt needs specific cues and pt repeats cues out loud while attempting task. Decreased heel to shin bilat   General Therapy Interventions   Planned Therapy Interventions balance training;bed mobility training;gait training;neuromuscular re-education;strengthening;transfer training;home program guidelines;progressive activity/exercise   Clinical Impression   Criteria for Skilled Therapeutic Intervention yes, treatment indicated   PT Diagnosis Difficulty with gait   Influenced by the following impairments Decreased motor planning, back pain, decreased coordination, decreased balance, decreased tolerance for activity, decreased strength   Functional limitations due to impairments increased assist for functional mobility   Clinical Presentation Evolving/Changing   Clinical Presentation Rationale clinical judgment   Clinical Decision Making (Complexity) Moderate complexity   Therapy Frequency` 2 times/day   Predicted Duration of Therapy Intervention (days/wks) 5 days   Anticipated Equipment Needs at Discharge walker   Anticipated Discharge Disposition Acute Rehabilitation Facility   Risk & Benefits of therapy have been explained Yes   Patient,  "Family & other staff in agreement with plan of care Yes   [facility] AM-PAC TM \"6 Clicks\"   2016, Trustees of [facility], under license to [employer].  All rights reserved.   6 Clicks Short Forms Basic Mobility Inpatient Short Form   [facility] AM-PAC  \"6 Clicks\" V.2 Basic Mobility Inpatient Short Form   1. Turning from your back to your side while in a flat bed without using bedrails? 3 - A Little   2. Moving from lying on your back to sitting on the side of a flat bed without using bedrails? 3 - A Little   3. Moving to and from a bed to a chair (including a wheelchair)? 2 - A Lot   4. Standing up from a chair using your arms (e.g., wheelchair, or bedside chair)? 2 - A Lot   5. To walk in hospital room? 2 - A Lot   6. Climbing 3-5 steps with a railing? 2 - A Lot   Basic Mobility Raw Score (Score out of 24.Lower scores equate to lower levels of function) 14   Total Evaluation Time   Total Evaluation Time (Minutes) 17     "

## 2019-04-25 NOTE — PLAN OF CARE
SLP: Pt working with other providers and talking to numerous family members on times of attempt. Will continue per POC

## 2019-04-25 NOTE — PLAN OF CARE
Discharge Planner OT   Patient plan for discharge: None stated, anticipate rehab  Current status: Pt with a history of HTN who presented to the ED with altered mental status and was found to have an acute parenchymal hemorrhage d/t AVF. Pt prior level of function was (I) for mobility, ADL and IADLs.     OT eval completed, treatment initiated. Pt in bed on arrival. Agreeable to therapy c/o lower back pain (10/10) and cramping. Pt supine > sit w mod A x1, mod verbal cues, use of bed rail and one person to manage lines, Pt moaned and c/o sever back pain. Pt EOB > stand w 2WW, mod A x1, min A x1 - Pt felt LOB due to pain required mod A x1, min A x1 to remain standing. Pt ambulated around bed to chair ~10 ft w 2WW, max verbal cues, physical redirection of walker and min A x2. Pt transferred stand > chair w mod A x1, min A x1, mod verbal cues, 2WW.  Pt able to doff sock (total A for donning due to pain), performing grooming and hygiene tasks w set-up and SBA. Pt transferred chair > stand > EOB w mod A x1, min A x1, 2WW mod verbal cues, and physical redirection of 2WW. Pt required mod A EOB > supine. Pt felt less pain when left sidelying for pillow. Session ended w nursing in the room.   Barriers to return to prior living situation: Cognition, motor planning, mobility, pain, ADL and IADL performance  Recommendations for discharge: TCU  Rationale for recommendations: Pt is well below baseline and experiencing severe lower back pain limiting ability to participate in mobility and ADLs. And will require continued therapy to improve safety and independence in ADLs, IADLs and mobility.       Entered by: Sasha Rogers 04/25/2019 3:26 PM

## 2019-04-25 NOTE — PLAN OF CARE
Pt here with spontaneous ICH. A& O x2, forgetful. Neuros: some WFD, otherwise intact. VSS on RA. Groin site CDI. Tele NSR. DD2 diet, thin liquids. Takes pills whole. Bedrest, frequent repos/weight shifts. Hinson patent, good UO. C/O back pain stating that it is from the mattress, declined initial intervention. Tylenol, ice and repos not effective later in shift, order received for lido patch, pt refused until later in shift. Resting comfortably this AM with lido patch in place. Plan for dAVF Friday, nursing cont to monitor until then. Discharge home pending.

## 2019-04-25 NOTE — PLAN OF CARE
Pt alert and oriented x3, d/o to date. VSS, Tele NSR. Neuros with a forgetfulness/confusion (improving), o/w intact. CMS intact. Lung sounds clear. BS hypoactive, c/o of constipation. Started on bowel medications. Hinson patent with adequate o/p. Low back pain improving with scheduled muscle relaxer. Ambulated in room with asst of 1-2 and GB - needs cues for safe ambulation. Plan for embolization of dural A/V fistula tomorrow at 1300.    1900 update: suppository administered, no results. BS improved. NPO after midnight.

## 2019-04-25 NOTE — PLAN OF CARE
1500 note: Pt alert and oriented x2-3, d/o to time and situation. Orientation improving throughout the day. VS within ordered parameters. Tele NSR. Neuros with mild confusion,  otherwise intact. Had diagnostic cerebral angiogram, returned to floor at 1200. R groin site WDL. Frequent VS, groin site and neuro checks completed per order. Lung sounds clear. Retaining urine, tracy placed. C/o low back pain, decreased with ice, repositioning and prn dilaudid. Tolerating DD2 diet with thin liquids. Plan for A/V fistula repair on 4/26 at 1300 with Dr. Thompson.

## 2019-04-25 NOTE — PLAN OF CARE
Discharge Planner PT   Patient plan for discharge: Pt wants to get moving and go home.  Current status: Seated at rest in chair: 151/103, after standing with some dizziness in seated 134/94, in standing 117/82 with some symptoms. Pt was able to stand and walk around bed with walker modA and assist of second person with IV. Pt has decreased motor planning and coordination. Pt needs specific cues and instructions and repeats task while doing it. Pt had delay and difficulty following multi step instructions.   Barriers to return to prior living situation: stairs, time alone while spouse at work  Recommendations for discharge: ARU  Rationale for recommendations: Given pt's high independence prior to hospitalization and pt's need for multiple therapies, pt would be a good candidate for ARU. The pt is motivated and will be able to tolerate 3 hrs of therapy a day and has potential to improve to more independent mobility with skilled care.       Entered by: Christel Bains 04/25/2019 11:10 AM

## 2019-04-25 NOTE — PROGRESS NOTES
[facility]    Medicine Progress Note - Hospitalist Service       Date of Admission:  4/22/2019  Assessment & Plan      Apolinar Mcgregor is a 58 year old male with a history of HTN who presented to the ED with altered mental status and was found to have an acute parenchymal hemorrhage      Acute parenchymal hemorrhage  AMS due to above.  Resolved   Suspected seizure related to above   Patient initially presented with AMS.  Found to have an acute parenchymal hemorrhage in the left parietal and occipital lobs measuring up to 4.7 cm with 4 mm rightward midline shift.  Wife does not know of any traumatic head injuries.  Does have a history of HTN but blood pressure well controlled on presentation.  UDS negative.  ED did speak with neurosurgery who recommended admission to ICU and continued monitoring  Repeat CT head showed stable hemorrhage   MRI brain on 4/23 showed the hemorrhage and mass effect were stable and without any obvious masses or abnormalities  Angiogram showed a left occipital dural AVF   - Neuro checks q2h   - Goal SBP <140 with PRN Labetalol and Hydralazine  - PT/OT/Speech consulted.  Likely ARU vs TCU at discharge   - Neurosurgery and neurology consulted and appreciate their assistance. Continue Keppra infusion.  Plan for dAVF embolization tomorrow      Anion gap metabolic acidosis. Resolved   Bicarb 14 and anion gap 24 on presentation.  Likely related to seizure     H/o HTN   Blood pressures well controlled  - PTA Norvasc     Chronic back pain  Constipation  Patient having worsening of his chronic back pain, suspect it is related to him being bedridden.  Has also developed constipation and believe it is related to the opiates  - Added Flexeril TID  - Bowel regimen  - Dilaudid PRN       Diet: Room Service  Combination Diet Dysphagia Diet Level 2: Mechan Altered; Thin Liquids (water, ice chips, juice, milk gelatin, ice cream, etc)  NPO per Anesthesia Guidelines for Procedure/Surgery Except  for: Meds  DVT Prophylaxis: Pneumatic Compression Devices  Hinson Catheter: in place, indication: Retention  Code Status: Full Code      Disposition Plan   Expected discharge: 2 - 3 days, recommended to TCU vs ARU once neurologic work up complete.  Entered: Bijan Hernandez DO 04/25/2019, 1:11 PM       The patient's care was discussed with the Bedside Nurse and Patient.    Bijan Hernandez DO  Hospitalist Service  [employer]    ______________________________________________________________________    Interval History   Patient seen and examined.  Having some issues with chronic back pain and constipation.  No new neurologic deficits.  No chest pain or SOB.  No fevers or chills.      Data reviewed today: I reviewed all medications, new labs and imaging results over the last 24 hours. I personally reviewed no images or EKG's today.    Physical Exam   Vital Signs: Temp: 97.9  F (36.6  C) Temp src: Oral BP: 137/88 Pulse: 76 Heart Rate: 75 Resp: 16 SpO2: 95 % O2 Device: None (Room air)    Weight: 215 lbs 12.8 oz  General Appearance: Resting in bed.  NAD   Respiratory: Clear to auscultation.  No respiratory distress  Cardiovascular: RRR.  No murmurs  GI: Bowel sounds present.  No tenderness  Skin: No rashes.  No cyanosis  Other: No edema.  Generalized weakness noted     Data   Recent Labs   Lab 04/25/19  0718 04/24/19  1025 04/24/19  0737 04/23/19  0459 04/22/19  1050   WBC 6.0 6.5 7.7 7.0 14.1*   HGB 15.0 15.5 15.4 15.1 16.8   MCV 87 87 87 87 91   * 150 156 140* 191   INR  --  1.04  --   --  1.17*     --  140 144 144   POTASSIUM 3.9  --  3.7 3.8 3.8   CHLORIDE 109  --  107 111* 106   CO2 24  --  28 28 14*   BUN 11  --  9 11 20   CR 0.90 0.91 0.94 0.80 1.10   ANIONGAP 8  --  5 5 24*   ALAN 8.9  --  9.0 8.8 9.5   *  --  110* 117* 178*   ALBUMIN  --   --   --   --  4.1   PROTTOTAL  --   --   --   --  8.5   BILITOTAL  --   --   --   --  0.4   ALKPHOS  --   --   --   --  101   ALT  --    --   --   --  43   AST  --   --   --   --  25     No results found for this or any previous visit (from the past 24 hour(s)).

## 2019-04-26 ENCOUNTER — APPOINTMENT (OUTPATIENT)
Dept: INTERVENTIONAL RADIOLOGY/VASCULAR | Facility: CLINIC | Age: 59
DRG: 020 | End: 2019-04-26
Attending: PSYCHIATRY & NEUROLOGY
Payer: COMMERCIAL

## 2019-04-26 ENCOUNTER — APPOINTMENT (OUTPATIENT)
Dept: OCCUPATIONAL THERAPY | Facility: CLINIC | Age: 59
DRG: 020 | End: 2019-04-26
Payer: COMMERCIAL

## 2019-04-26 ENCOUNTER — ANESTHESIA EVENT (OUTPATIENT)
Dept: SURGERY | Facility: CLINIC | Age: 59
DRG: 020 | End: 2019-04-26
Payer: COMMERCIAL

## 2019-04-26 ENCOUNTER — ANESTHESIA (OUTPATIENT)
Dept: SURGERY | Facility: CLINIC | Age: 59
DRG: 020 | End: 2019-04-26
Payer: COMMERCIAL

## 2019-04-26 LAB
ANION GAP SERPL CALCULATED.3IONS-SCNC: 6 MMOL/L (ref 3–14)
BUN SERPL-MCNC: 14 MG/DL (ref 7–30)
CALCIUM SERPL-MCNC: 8.9 MG/DL (ref 8.5–10.1)
CHLORIDE SERPL-SCNC: 108 MMOL/L (ref 94–109)
CO2 SERPL-SCNC: 25 MMOL/L (ref 20–32)
CREAT SERPL-MCNC: 0.91 MG/DL (ref 0.66–1.25)
ERYTHROCYTE [DISTWIDTH] IN BLOOD BY AUTOMATED COUNT: 14.1 % (ref 10–15)
GFR SERPL CREATININE-BSD FRML MDRD: >90 ML/MIN/{1.73_M2}
GLUCOSE BLDC GLUCOMTR-MCNC: 143 MG/DL (ref 70–99)
GLUCOSE BLDC GLUCOMTR-MCNC: 168 MG/DL (ref 70–99)
GLUCOSE SERPL-MCNC: 127 MG/DL (ref 70–99)
HCT VFR BLD AUTO: 43.6 % (ref 40–53)
HGB BLD-MCNC: 14.9 G/DL (ref 13.3–17.7)
MCH RBC QN AUTO: 29.7 PG (ref 26.5–33)
MCHC RBC AUTO-ENTMCNC: 34.2 G/DL (ref 31.5–36.5)
MCV RBC AUTO: 87 FL (ref 78–100)
PLATELET # BLD AUTO: 158 10E9/L (ref 150–450)
POTASSIUM SERPL-SCNC: 3.8 MMOL/L (ref 3.4–5.3)
RBC # BLD AUTO: 5.02 10E12/L (ref 4.4–5.9)
SODIUM SERPL-SCNC: 139 MMOL/L (ref 133–144)
WBC # BLD AUTO: 7 10E9/L (ref 4–11)

## 2019-04-26 PROCEDURE — 25000132 ZZH RX MED GY IP 250 OP 250 PS 637: Performed by: INTERNAL MEDICINE

## 2019-04-26 PROCEDURE — 37000009 ZZH ANESTHESIA TECHNICAL FEE, EACH ADDTL 15 MIN

## 2019-04-26 PROCEDURE — 37000008 ZZH ANESTHESIA TECHNICAL FEE, 1ST 30 MIN

## 2019-04-26 PROCEDURE — C1769 GUIDE WIRE: HCPCS

## 2019-04-26 PROCEDURE — 27210906 ZZH KIT CR8

## 2019-04-26 PROCEDURE — 25000566 ZZH SEVOFLURANE, EA 15 MIN

## 2019-04-26 PROCEDURE — 25800030 ZZH RX IP 258 OP 636: Performed by: PSYCHIATRY & NEUROLOGY

## 2019-04-26 PROCEDURE — C1760 CLOSURE DEV, VASC: HCPCS

## 2019-04-26 PROCEDURE — 80048 BASIC METABOLIC PNL TOTAL CA: CPT | Performed by: INTERNAL MEDICINE

## 2019-04-26 PROCEDURE — 25000128 H RX IP 250 OP 636: Performed by: NURSE ANESTHETIST, CERTIFIED REGISTERED

## 2019-04-26 PROCEDURE — 97530 THERAPEUTIC ACTIVITIES: CPT | Mod: GO | Performed by: OCCUPATIONAL THERAPY ASSISTANT

## 2019-04-26 PROCEDURE — 05LL3DZ OCCLUSION OF INTRACRANIAL VEIN WITH INTRALUMINAL DEVICE, PERCUTANEOUS APPROACH: ICD-10-PCS | Performed by: PSYCHIATRY & NEUROLOGY

## 2019-04-26 PROCEDURE — 03LG3DZ OCCLUSION OF INTRACRANIAL ARTERY WITH INTRALUMINAL DEVICE, PERCUTANEOUS APPROACH: ICD-10-PCS | Performed by: PSYCHIATRY & NEUROLOGY

## 2019-04-26 PROCEDURE — 27210732 ZZH ACCESSORY CR1

## 2019-04-26 PROCEDURE — 25000125 ZZHC RX 250

## 2019-04-26 PROCEDURE — 36217 PLACE CATHETER IN ARTERY: CPT

## 2019-04-26 PROCEDURE — 99233 SBSQ HOSP IP/OBS HIGH 50: CPT | Performed by: PSYCHIATRY & NEUROLOGY

## 2019-04-26 PROCEDURE — 25000128 H RX IP 250 OP 636: Performed by: ANESTHESIOLOGY

## 2019-04-26 PROCEDURE — 25000132 ZZH RX MED GY IP 250 OP 250 PS 637: Performed by: PHYSICIAN ASSISTANT

## 2019-04-26 PROCEDURE — 25000128 H RX IP 250 OP 636: Performed by: INTERNAL MEDICINE

## 2019-04-26 PROCEDURE — 85027 COMPLETE CBC AUTOMATED: CPT | Performed by: INTERNAL MEDICINE

## 2019-04-26 PROCEDURE — 25000125 ZZHC RX 250: Performed by: NURSE ANESTHETIST, CERTIFIED REGISTERED

## 2019-04-26 PROCEDURE — 99232 SBSQ HOSP IP/OBS MODERATE 35: CPT | Performed by: INTERNAL MEDICINE

## 2019-04-26 PROCEDURE — 25800030 ZZH RX IP 258 OP 636: Performed by: NURSE ANESTHETIST, CERTIFIED REGISTERED

## 2019-04-26 PROCEDURE — 40000170 ZZH STATISTIC PRE-PROCEDURE ASSESSMENT II

## 2019-04-26 PROCEDURE — 25800030 ZZH RX IP 258 OP 636: Performed by: ANESTHESIOLOGY

## 2019-04-26 PROCEDURE — C1887 CATHETER, GUIDING: HCPCS

## 2019-04-26 PROCEDURE — 20000003 ZZH R&B ICU

## 2019-04-26 PROCEDURE — 71000014 ZZH RECOVERY PHASE 1 LEVEL 2 FIRST HR

## 2019-04-26 PROCEDURE — 36415 COLL VENOUS BLD VENIPUNCTURE: CPT | Performed by: INTERNAL MEDICINE

## 2019-04-26 PROCEDURE — 25500064 ZZH RX 255 OP 636: Performed by: PSYCHIATRY & NEUROLOGY

## 2019-04-26 PROCEDURE — 27210738 ZZH ACCESSORY CR2

## 2019-04-26 PROCEDURE — 25000125 ZZHC RX 250: Performed by: PSYCHIATRY & NEUROLOGY

## 2019-04-26 PROCEDURE — 27211140 ZZHC CATH NEURO CR17

## 2019-04-26 PROCEDURE — 27210886 ZZH ACCESSORY CR5

## 2019-04-26 PROCEDURE — 00000146 ZZHCL STATISTIC GLUCOSE BY METER IP

## 2019-04-26 PROCEDURE — 97535 SELF CARE MNGMENT TRAINING: CPT | Mod: GO | Performed by: OCCUPATIONAL THERAPY ASSISTANT

## 2019-04-26 PROCEDURE — 27211078 ZZH GLUE EMBOLI CR28

## 2019-04-26 PROCEDURE — 71000015 ZZH RECOVERY PHASE 1 LEVEL 2 EA ADDTL HR

## 2019-04-26 PROCEDURE — 25000128 H RX IP 250 OP 636: Performed by: PSYCHIATRY & NEUROLOGY

## 2019-04-26 RX ORDER — PROPOFOL 10 MG/ML
INJECTION, EMULSION INTRAVENOUS PRN
Status: DISCONTINUED | OUTPATIENT
Start: 2019-04-26 | End: 2019-04-26

## 2019-04-26 RX ORDER — DEXAMETHASONE SODIUM PHOSPHATE 4 MG/ML
4 INJECTION, SOLUTION INTRA-ARTICULAR; INTRALESIONAL; INTRAMUSCULAR; INTRAVENOUS; SOFT TISSUE
Status: DISCONTINUED | OUTPATIENT
Start: 2019-04-26 | End: 2019-04-26 | Stop reason: HOSPADM

## 2019-04-26 RX ORDER — LIDOCAINE HYDROCHLORIDE 10 MG/ML
INJECTION, SOLUTION INFILTRATION; PERINEURAL
Status: COMPLETED
Start: 2019-04-26 | End: 2019-04-26

## 2019-04-26 RX ORDER — SODIUM CHLORIDE, SODIUM LACTATE, POTASSIUM CHLORIDE, CALCIUM CHLORIDE 600; 310; 30; 20 MG/100ML; MG/100ML; MG/100ML; MG/100ML
INJECTION, SOLUTION INTRAVENOUS CONTINUOUS PRN
Status: DISCONTINUED | OUTPATIENT
Start: 2019-04-26 | End: 2019-04-26

## 2019-04-26 RX ORDER — NICOTINE POLACRILEX 4 MG
15-30 LOZENGE BUCCAL
Status: DISCONTINUED | OUTPATIENT
Start: 2019-04-26 | End: 2019-04-26

## 2019-04-26 RX ORDER — FENTANYL CITRATE 50 UG/ML
INJECTION, SOLUTION INTRAMUSCULAR; INTRAVENOUS PRN
Status: DISCONTINUED | OUTPATIENT
Start: 2019-04-26 | End: 2019-04-26

## 2019-04-26 RX ORDER — ACETAMINOPHEN 500 MG
500 TABLET ORAL EVERY 6 HOURS PRN
Status: DISCONTINUED | OUTPATIENT
Start: 2019-04-26 | End: 2019-04-26

## 2019-04-26 RX ORDER — HYDRALAZINE HYDROCHLORIDE 20 MG/ML
2.5-5 INJECTION INTRAMUSCULAR; INTRAVENOUS EVERY 10 MIN PRN
Status: DISCONTINUED | OUTPATIENT
Start: 2019-04-26 | End: 2019-04-26 | Stop reason: HOSPADM

## 2019-04-26 RX ORDER — HEPARIN SODIUM 200 [USP'U]/100ML
INJECTION, SOLUTION INTRAVENOUS
Status: DISCONTINUED
Start: 2019-04-26 | End: 2019-04-26 | Stop reason: HOSPADM

## 2019-04-26 RX ORDER — LABETALOL HYDROCHLORIDE 5 MG/ML
INJECTION, SOLUTION INTRAVENOUS PRN
Status: DISCONTINUED | OUTPATIENT
Start: 2019-04-26 | End: 2019-04-26

## 2019-04-26 RX ORDER — ONDANSETRON 2 MG/ML
4 INJECTION INTRAMUSCULAR; INTRAVENOUS EVERY 30 MIN PRN
Status: DISCONTINUED | OUTPATIENT
Start: 2019-04-26 | End: 2019-04-26 | Stop reason: HOSPADM

## 2019-04-26 RX ORDER — FENTANYL CITRATE 50 UG/ML
25-50 INJECTION, SOLUTION INTRAMUSCULAR; INTRAVENOUS
Status: DISCONTINUED | OUTPATIENT
Start: 2019-04-26 | End: 2019-04-26 | Stop reason: HOSPADM

## 2019-04-26 RX ORDER — SODIUM CHLORIDE, SODIUM LACTATE, POTASSIUM CHLORIDE, CALCIUM CHLORIDE 600; 310; 30; 20 MG/100ML; MG/100ML; MG/100ML; MG/100ML
INJECTION, SOLUTION INTRAVENOUS CONTINUOUS
Status: DISCONTINUED | OUTPATIENT
Start: 2019-04-26 | End: 2019-04-26 | Stop reason: HOSPADM

## 2019-04-26 RX ORDER — LIDOCAINE 40 MG/G
CREAM TOPICAL
Status: DISCONTINUED | OUTPATIENT
Start: 2019-04-26 | End: 2019-04-26 | Stop reason: HOSPADM

## 2019-04-26 RX ORDER — LABETALOL 20 MG/4 ML (5 MG/ML) INTRAVENOUS SYRINGE
10
Status: COMPLETED | OUTPATIENT
Start: 2019-04-26 | End: 2019-04-26

## 2019-04-26 RX ORDER — ONDANSETRON 4 MG/1
4 TABLET, ORALLY DISINTEGRATING ORAL EVERY 30 MIN PRN
Status: DISCONTINUED | OUTPATIENT
Start: 2019-04-26 | End: 2019-04-26 | Stop reason: HOSPADM

## 2019-04-26 RX ORDER — SODIUM CHLORIDE 9 MG/ML
INJECTION, SOLUTION INTRAVENOUS CONTINUOUS
Status: DISCONTINUED | OUTPATIENT
Start: 2019-04-26 | End: 2019-04-27

## 2019-04-26 RX ORDER — DEXTROSE MONOHYDRATE 25 G/50ML
25-50 INJECTION, SOLUTION INTRAVENOUS
Status: DISCONTINUED | OUTPATIENT
Start: 2019-04-26 | End: 2019-04-27

## 2019-04-26 RX ORDER — HEPARIN SODIUM 1000 [USP'U]/ML
INJECTION, SOLUTION INTRAVENOUS; SUBCUTANEOUS PRN
Status: DISCONTINUED | OUTPATIENT
Start: 2019-04-26 | End: 2019-04-26

## 2019-04-26 RX ORDER — LIDOCAINE HYDROCHLORIDE 20 MG/ML
INJECTION, SOLUTION INFILTRATION; PERINEURAL PRN
Status: DISCONTINUED | OUTPATIENT
Start: 2019-04-26 | End: 2019-04-26

## 2019-04-26 RX ORDER — ONDANSETRON 2 MG/ML
INJECTION INTRAMUSCULAR; INTRAVENOUS PRN
Status: DISCONTINUED | OUTPATIENT
Start: 2019-04-26 | End: 2019-04-26

## 2019-04-26 RX ORDER — DEXTROSE MONOHYDRATE 25 G/50ML
25-50 INJECTION, SOLUTION INTRAVENOUS
Status: DISCONTINUED | OUTPATIENT
Start: 2019-04-26 | End: 2019-04-26

## 2019-04-26 RX ORDER — DEXAMETHASONE SODIUM PHOSPHATE 4 MG/ML
INJECTION, SOLUTION INTRA-ARTICULAR; INTRALESIONAL; INTRAMUSCULAR; INTRAVENOUS; SOFT TISSUE PRN
Status: DISCONTINUED | OUTPATIENT
Start: 2019-04-26 | End: 2019-04-26

## 2019-04-26 RX ORDER — HEPARIN SODIUM 200 [USP'U]/100ML
500 INJECTION, SOLUTION INTRAVENOUS CONTINUOUS PRN
Status: DISCONTINUED | OUTPATIENT
Start: 2019-04-26 | End: 2019-04-26 | Stop reason: HOSPADM

## 2019-04-26 RX ORDER — HYDROMORPHONE HYDROCHLORIDE 1 MG/ML
.3-.5 INJECTION, SOLUTION INTRAMUSCULAR; INTRAVENOUS; SUBCUTANEOUS EVERY 5 MIN PRN
Status: DISCONTINUED | OUTPATIENT
Start: 2019-04-26 | End: 2019-04-26 | Stop reason: HOSPADM

## 2019-04-26 RX ORDER — NALOXONE HYDROCHLORIDE 0.4 MG/ML
.1-.4 INJECTION, SOLUTION INTRAMUSCULAR; INTRAVENOUS; SUBCUTANEOUS
Status: DISCONTINUED | OUTPATIENT
Start: 2019-04-26 | End: 2019-04-26

## 2019-04-26 RX ORDER — GLYCOPYRROLATE 0.2 MG/ML
INJECTION, SOLUTION INTRAMUSCULAR; INTRAVENOUS PRN
Status: DISCONTINUED | OUTPATIENT
Start: 2019-04-26 | End: 2019-04-26

## 2019-04-26 RX ORDER — DEXTROSE MONOHYDRATE 25 G/50ML
25-50 INJECTION, SOLUTION INTRAVENOUS
Status: DISCONTINUED | OUTPATIENT
Start: 2019-04-26 | End: 2019-04-26 | Stop reason: HOSPADM

## 2019-04-26 RX ORDER — NICOTINE POLACRILEX 4 MG
15-30 LOZENGE BUCCAL
Status: DISCONTINUED | OUTPATIENT
Start: 2019-04-26 | End: 2019-04-27

## 2019-04-26 RX ORDER — NICOTINE POLACRILEX 4 MG
15-30 LOZENGE BUCCAL
Status: DISCONTINUED | OUTPATIENT
Start: 2019-04-26 | End: 2019-04-26 | Stop reason: HOSPADM

## 2019-04-26 RX ORDER — IOPAMIDOL 612 MG/ML
150 INJECTION, SOLUTION INTRAVASCULAR ONCE
Status: COMPLETED | OUTPATIENT
Start: 2019-04-26 | End: 2019-04-26

## 2019-04-26 RX ORDER — NEOSTIGMINE METHYLSULFATE 1 MG/ML
VIAL (ML) INJECTION PRN
Status: DISCONTINUED | OUTPATIENT
Start: 2019-04-26 | End: 2019-04-26

## 2019-04-26 RX ADMIN — PHENYLEPHRINE HYDROCHLORIDE 100 MCG: 10 INJECTION, SOLUTION INTRAMUSCULAR; INTRAVENOUS; SUBCUTANEOUS at 14:59

## 2019-04-26 RX ADMIN — PHENYLEPHRINE HYDROCHLORIDE 200 MCG: 10 INJECTION, SOLUTION INTRAMUSCULAR; INTRAVENOUS; SUBCUTANEOUS at 14:20

## 2019-04-26 RX ADMIN — LABETALOL HYDROCHLORIDE 10 MG: 5 INJECTION INTRAVENOUS at 17:00

## 2019-04-26 RX ADMIN — DEXMEDETOMIDINE HYDROCHLORIDE 12 MCG: 100 INJECTION, SOLUTION INTRAVENOUS at 13:16

## 2019-04-26 RX ADMIN — PHENYLEPHRINE HYDROCHLORIDE 200 MCG: 10 INJECTION, SOLUTION INTRAMUSCULAR; INTRAVENOUS; SUBCUTANEOUS at 14:00

## 2019-04-26 RX ADMIN — SODIUM CHLORIDE 2.5 MG/HR: 900 INJECTION, SOLUTION INTRAVENOUS at 18:23

## 2019-04-26 RX ADMIN — HEPARIN SODIUM 9000 UNITS: 1000 INJECTION, SOLUTION INTRAVENOUS; SUBCUTANEOUS at 13:48

## 2019-04-26 RX ADMIN — LABETALOL HYDROCHLORIDE 10 MG: 5 INJECTION INTRAVENOUS at 16:42

## 2019-04-26 RX ADMIN — IOPAMIDOL 110 ML: 612 INJECTION, SOLUTION INTRAVENOUS at 16:30

## 2019-04-26 RX ADMIN — CYCLOBENZAPRINE HYDROCHLORIDE 10 MG: 10 TABLET, FILM COATED ORAL at 21:37

## 2019-04-26 RX ADMIN — SODIUM CHLORIDE, POTASSIUM CHLORIDE, SODIUM LACTATE AND CALCIUM CHLORIDE: 600; 310; 30; 20 INJECTION, SOLUTION INTRAVENOUS at 12:49

## 2019-04-26 RX ADMIN — ACETAMINOPHEN 650 MG: 325 TABLET, FILM COATED ORAL at 20:03

## 2019-04-26 RX ADMIN — PHENYLEPHRINE HYDROCHLORIDE 100 MCG: 10 INJECTION, SOLUTION INTRAMUSCULAR; INTRAVENOUS; SUBCUTANEOUS at 15:28

## 2019-04-26 RX ADMIN — HYDRALAZINE HYDROCHLORIDE 5 MG: 20 INJECTION INTRAMUSCULAR; INTRAVENOUS at 17:32

## 2019-04-26 RX ADMIN — LIDOCAINE HYDROCHLORIDE 5 ML: 10 INJECTION, SOLUTION INFILTRATION; PERINEURAL at 13:56

## 2019-04-26 RX ADMIN — PHENYLEPHRINE HYDROCHLORIDE 200 MCG: 10 INJECTION, SOLUTION INTRAMUSCULAR; INTRAVENOUS; SUBCUTANEOUS at 14:11

## 2019-04-26 RX ADMIN — GLYCOPYRROLATE 1 MG: 0.2 INJECTION, SOLUTION INTRAMUSCULAR; INTRAVENOUS at 16:26

## 2019-04-26 RX ADMIN — PHENYLEPHRINE HYDROCHLORIDE 100 MCG: 10 INJECTION, SOLUTION INTRAMUSCULAR; INTRAVENOUS; SUBCUTANEOUS at 13:52

## 2019-04-26 RX ADMIN — HEPARIN SODIUM 1000 UNITS: 1000 INJECTION, SOLUTION INTRAVENOUS; SUBCUTANEOUS at 14:56

## 2019-04-26 RX ADMIN — LIDOCAINE HYDROCHLORIDE 100 MG: 20 INJECTION, SOLUTION INFILTRATION; PERINEURAL at 13:24

## 2019-04-26 RX ADMIN — PHENYLEPHRINE HYDROCHLORIDE 200 MCG: 10 INJECTION, SOLUTION INTRAMUSCULAR; INTRAVENOUS; SUBCUTANEOUS at 14:30

## 2019-04-26 RX ADMIN — LEVETIRACETAM 500 MG: 5 INJECTION INTRAVENOUS at 21:42

## 2019-04-26 RX ADMIN — LABETALOL 20 MG/4 ML (5 MG/ML) INTRAVENOUS SYRINGE 10 MG: at 17:22

## 2019-04-26 RX ADMIN — PROPOFOL 200 MG: 10 INJECTION, EMULSION INTRAVENOUS at 13:24

## 2019-04-26 RX ADMIN — HYDROMORPHONE HYDROCHLORIDE 0.3 MG: 1 INJECTION, SOLUTION INTRAMUSCULAR; INTRAVENOUS; SUBCUTANEOUS at 19:18

## 2019-04-26 RX ADMIN — ROCURONIUM BROMIDE 20 MG: 10 INJECTION INTRAVENOUS at 14:46

## 2019-04-26 RX ADMIN — SODIUM CHLORIDE: 9 INJECTION, SOLUTION INTRAVENOUS at 19:39

## 2019-04-26 RX ADMIN — ACETAMINOPHEN 650 MG: 325 TABLET, FILM COATED ORAL at 09:37

## 2019-04-26 RX ADMIN — HEPARIN SODIUM 10000 UNITS: 10000 INJECTION INTRAVENOUS; SUBCUTANEOUS at 13:54

## 2019-04-26 RX ADMIN — HYDRALAZINE HYDROCHLORIDE 5 MG: 20 INJECTION INTRAMUSCULAR; INTRAVENOUS at 17:42

## 2019-04-26 RX ADMIN — SODIUM CHLORIDE, SODIUM LACTATE, POTASSIUM CHLORIDE, CALCIUM CHLORIDE: 600; 310; 30; 20 INJECTION, SOLUTION INTRAVENOUS at 16:23

## 2019-04-26 RX ADMIN — METOCLOPRAMIDE 10 MG: 5 INJECTION, SOLUTION INTRAMUSCULAR; INTRAVENOUS at 21:30

## 2019-04-26 RX ADMIN — PROPOFOL 30 MG: 10 INJECTION, EMULSION INTRAVENOUS at 16:14

## 2019-04-26 RX ADMIN — PHENYLEPHRINE HYDROCHLORIDE 100 MCG: 10 INJECTION, SOLUTION INTRAMUSCULAR; INTRAVENOUS; SUBCUTANEOUS at 15:41

## 2019-04-26 RX ADMIN — PHENYLEPHRINE HYDROCHLORIDE 100 MCG: 10 INJECTION, SOLUTION INTRAMUSCULAR; INTRAVENOUS; SUBCUTANEOUS at 15:53

## 2019-04-26 RX ADMIN — LIDOCAINE 1 PATCH: 560 PATCH PERCUTANEOUS; TOPICAL; TRANSDERMAL at 03:48

## 2019-04-26 RX ADMIN — LABETALOL HYDROCHLORIDE 10 MG: 5 INJECTION INTRAVENOUS at 16:40

## 2019-04-26 RX ADMIN — DEXAMETHASONE SODIUM PHOSPHATE 4 MG: 4 INJECTION, SOLUTION INTRA-ARTICULAR; INTRALESIONAL; INTRAMUSCULAR; INTRAVENOUS; SOFT TISSUE at 13:40

## 2019-04-26 RX ADMIN — HYDRALAZINE HYDROCHLORIDE 5 MG: 20 INJECTION INTRAMUSCULAR; INTRAVENOUS at 17:14

## 2019-04-26 RX ADMIN — ROCURONIUM BROMIDE 40 MG: 10 INJECTION INTRAVENOUS at 13:24

## 2019-04-26 RX ADMIN — AMLODIPINE BESYLATE 5 MG: 5 TABLET ORAL at 09:25

## 2019-04-26 RX ADMIN — FENTANYL CITRATE 25 MCG: 50 INJECTION, SOLUTION INTRAMUSCULAR; INTRAVENOUS at 16:41

## 2019-04-26 RX ADMIN — HEPARIN SODIUM 10000 UNITS: 10000 INJECTION INTRAVENOUS; SUBCUTANEOUS at 13:57

## 2019-04-26 RX ADMIN — SODIUM CHLORIDE, SODIUM LACTATE, POTASSIUM CHLORIDE, CALCIUM CHLORIDE: 600; 310; 30; 20 INJECTION, SOLUTION INTRAVENOUS at 14:40

## 2019-04-26 RX ADMIN — SODIUM CHLORIDE, SODIUM LACTATE, POTASSIUM CHLORIDE, CALCIUM CHLORIDE: 600; 310; 30; 20 INJECTION, SOLUTION INTRAVENOUS at 13:16

## 2019-04-26 RX ADMIN — ONDANSETRON 4 MG: 2 INJECTION INTRAMUSCULAR; INTRAVENOUS at 16:26

## 2019-04-26 RX ADMIN — NEOSTIGMINE METHYLSULFATE 5 MG: 1 INJECTION, SOLUTION INTRAVENOUS at 16:26

## 2019-04-26 RX ADMIN — FENTANYL CITRATE 25 MCG: 50 INJECTION, SOLUTION INTRAMUSCULAR; INTRAVENOUS at 13:46

## 2019-04-26 RX ADMIN — HEPARIN SODIUM 1000 UNITS: 1000 INJECTION, SOLUTION INTRAVENOUS; SUBCUTANEOUS at 15:54

## 2019-04-26 RX ADMIN — CYCLOBENZAPRINE HYDROCHLORIDE 10 MG: 10 TABLET, FILM COATED ORAL at 09:25

## 2019-04-26 RX ADMIN — LIDOCAINE HYDROCHLORIDE 0.2 ML: 10 INJECTION, SOLUTION EPIDURAL; INFILTRATION; INTRACAUDAL; PERINEURAL at 12:41

## 2019-04-26 RX ADMIN — LIDOCAINE 1 PATCH: 560 PATCH PERCUTANEOUS; TOPICAL; TRANSDERMAL at 20:03

## 2019-04-26 RX ADMIN — HYDROMORPHONE HYDROCHLORIDE 0.2 MG: 1 INJECTION, SOLUTION INTRAMUSCULAR; INTRAVENOUS; SUBCUTANEOUS at 19:37

## 2019-04-26 RX ADMIN — FENTANYL CITRATE 25 MCG: 50 INJECTION, SOLUTION INTRAMUSCULAR; INTRAVENOUS at 13:24

## 2019-04-26 RX ADMIN — ROCURONIUM BROMIDE 20 MG: 10 INJECTION INTRAVENOUS at 14:00

## 2019-04-26 ASSESSMENT — ENCOUNTER SYMPTOMS: SEIZURES: 1

## 2019-04-26 ASSESSMENT — ACTIVITIES OF DAILY LIVING (ADL)
ADLS_ACUITY_SCORE: 15
ADLS_ACUITY_SCORE: 14
ADLS_ACUITY_SCORE: 16

## 2019-04-26 ASSESSMENT — LIFESTYLE VARIABLES: TOBACCO_USE: 1

## 2019-04-26 NOTE — ANESTHESIA PREPROCEDURE EVALUATION
Anesthesia Pre-Procedure Evaluation    Patient: Apolinar Mcgregor   MRN: [id number] : 1960          Preoperative Diagnosis: DURA-ARTERIOVENOUS FISTULA    Procedure(s):  DURA ARTERIOVENOUS FISTULA EMBOLIZATION  (IR #3)  (DR ANDUJAR)    Past Medical History:   Diagnosis Date     Gastro-oesophageal reflux disease      Hypertension      Urinary retention      Past Surgical History:   Procedure Laterality Date     CHOLECYSTECTOMY       URETHROPLASTY WITH BUCCAL GRAFT  2013    Procedure: URETHROPLASTY WITH BUCCAL GRAFT;  Anterior Urethroplasty with Buccal Graft ;  Surgeon: Dmitry Moore MD;  Location: [facility] OR       Anesthesia Evaluation     . Pt has had prior anesthetic.     No history of anesthetic complications          ROS/MED HX    ENT/Pulmonary:     (+)sleep apnea, tobacco use, Past use doesn't use CPAP , . .    Neurologic: Comment: IMPRESSION:    1. Parenchymal hemorrhage centered in the left parietal/occipital  region is again seen and not significantly changed size. No obvious  underlying mass is appreciated although evaluation is limited given  the intrinsic T1 hyperintensity of the hemorrhage.  2. Unchanged mass effect on the occipital horn of the left lateral  ventricle and 5 mm rightward midline shift.  3. Prominent enhancement of sulcal vessels in the left parietal and  occipital lobes which may be due to vascular congestion. There may  also be a developmental venous anomaly present. Of note, developmental  venous anomalies commonly occur with cavernous malformations.    Intracerebral hemorrhage with altered mental status, seizures on admission, now resolved    (+)seizures CVA with deficits- memory los,     Cardiovascular:     (+) hypertension----. : . . . :. .       METS/Exercise Tolerance:     Hematologic:         Musculoskeletal:         GI/Hepatic:     (+) GERD Asymptomatic on medication,       Renal/Genitourinary:     (+) Other Renal/ Genitourinary, urethral stricture      Endo:  - neg  "endo ROS       Psychiatric:         Infectious Disease:         Malignancy:         Other:                          Physical Exam  Normal systems: cardiovascular and pulmonary    Airway   Mallampati: II  TM distance: >3 FB  Neck ROM: full    Dental     Cardiovascular       Pulmonary             Lab Results   Component Value Date    WBC 7.0 04/26/2019    HGB 14.9 04/26/2019    HCT 43.6 04/26/2019     04/26/2019     04/26/2019    POTASSIUM 3.8 04/26/2019    CHLORIDE 108 04/26/2019    CO2 25 04/26/2019    BUN 14 04/26/2019    CR 0.91 04/26/2019     (H) 04/26/2019    ALAN 8.9 04/26/2019    ALBUMIN 4.1 04/22/2019    PROTTOTAL 8.5 04/22/2019    ALT 43 04/22/2019    AST 25 04/22/2019    ALKPHOS 101 04/22/2019    BILITOTAL 0.4 04/22/2019    LIPASE 187 05/11/2008    PTT 34 04/24/2019    INR 1.04 04/24/2019       Preop Vitals  BP Readings from Last 3 Encounters:   04/26/19 132/86   07/07/14 (!) 144/98   10/28/13 (!) 157/103    Pulse Readings from Last 3 Encounters:   04/25/19 76   07/07/14 81   10/28/13 93      Resp Readings from Last 3 Encounters:   04/26/19 16   06/12/13 16   04/30/13 16    SpO2 Readings from Last 3 Encounters:   04/26/19 95%   06/12/13 100%   04/30/13 97%      Temp Readings from Last 1 Encounters:   04/26/19 36.7  C (98.1  F) (Oral)    Ht Readings from Last 1 Encounters:   04/22/19 1.778 m (5' 10\")      Wt Readings from Last 1 Encounters:   04/24/19 97.9 kg (215 lb 12.8 oz)    Estimated body mass index is 30.96 kg/m  as calculated from the following:    Height as of this encounter: 1.778 m (5' 10\").    Weight as of this encounter: 97.9 kg (215 lb 12.8 oz).       Anesthesia Plan      History & Physical Review  History and physical reviewed and following examination; no interval change.    ASA Status:  3 .    NPO Status:  > 8 hours    Plan for General and ETT with Intravenous and Propofol induction. Maintenance will be Balanced.    PONV prophylaxis:  Ondansetron (or other 5HT-3) and " Dexamethasone or Solumedrol  Additional equipment: Videolaryngoscope      Postoperative Care  Postoperative pain management:  Multi-modal analgesia.      Consents  Anesthetic plan, risks, benefits and alternatives discussed with:  Patient..                 Henri Simpson MD

## 2019-04-26 NOTE — ANESTHESIA POSTPROCEDURE EVALUATION
Patient: Apolinar HECTOR Balbir    Procedure(s):  DURA ARTERIOVENOUS FISTULA EMBOLIZATION  (IR #3)  (DR ANDUJAR)    Diagnosis:DURA-ARTERIOVENOUS FISTULA  Diagnosis Additional Information: No value filed.    Anesthesia Type:  General, ETT    Note:  Anesthesia Post Evaluation    Patient location during evaluation: Bedside  Patient participation: Able to fully participate in evaluation  Level of consciousness: awake and alert  Pain management: adequate  Airway patency: patent  Cardiovascular status: acceptable  Respiratory status: acceptable  Hydration status: acceptable  PONV: none             Last vitals:  Vitals:    04/26/19 1820 04/26/19 1830 04/26/19 1835   BP: 128/78 128/80 120/90   Pulse: 91 93 96   Resp: 14 15 14   Temp:      SpO2: 98% 98% 98%         Electronically Signed By: Bernard Redmond MD  April 26, 2019  6:48 PM

## 2019-04-26 NOTE — PLAN OF CARE
PT: Session attempted. Pt sleeping upon arrival, awakes to voice. Pt politely requesting to rest anticipating going down for procedure (AV fistula embolization) soon. Will be unavailable for PM PT session. Cancel PT this date.

## 2019-04-26 NOTE — ANESTHESIA CARE TRANSFER NOTE
Patient: Apolinar Mcgregor    Procedure(s):  DURA ARTERIOVENOUS FISTULA EMBOLIZATION  (IR #3)  (DR ANDUJAR)    Diagnosis: DURA-ARTERIOVENOUS FISTULA  Diagnosis Additional Information: No value filed.    Anesthesia Type:   General, ETT     Note:  Airway :Face Mask  Patient transferred to:PACU  Handoff Report: Identifed the Patient, Identified the Reponsible Provider, Reviewed the pertinent medical history, Discussed the surgical course, Reviewed Intra-OP anesthesia mangement and issues during anesthesia, Set expectations for post-procedure period and Allowed opportunity for questions and acknowledgement of understanding      Vitals: (Last set prior to Anesthesia Care Transfer)    CRNA VITALS  4/26/2019 1620 - 4/26/2019 1704      4/26/2019             Pulse:  84    SpO2:  98 %    Resp Rate (set):  10                Electronically Signed By: SHAD Acevedo CRNA  April 26, 2019  5:04 PM

## 2019-04-26 NOTE — PROVIDER NOTIFICATION
Page sent to hospitalist asking to reorder tylenol as it got discontinued earlier.  Orders placed for tylenol q4 hours prn.

## 2019-04-26 NOTE — PROGRESS NOTES
"[facility]    Neurosurgery Progress Note    Date of Service (when I saw the patient): 04/26/2019     Assessment & Plan   Apolinar Mcgregor is a 58 year old male who was admitted on 4/22/2019 with AMS. Was found to have left parietal-occipital spontaneous IPH.     Active Problems:    Intracranial hemorrhage (H)    Assessment: left parietal-occipital spontaneous IPH    Plan:   -Per neurology/medicine; dAVF embolization planned for this afternoon  -Will have him follow up as outpatient in 4 weeks with head CT prior  -NSG will sign off at this time; please contact if further needed      I have discussed the following assessment and plan Dr. Ponce who is in agreement with initial plan and will follow up with further consultation recommendations.      Stephany Islas PA-C  [facility]  [address]  [address] [place] [id number]    Tel [phone]  Pager [phone]      Interval History   Stable. Embolization planned for this afternoon.    Physical Exam   Temp: 97.9  F (36.6  C) Temp src: Oral BP: 122/90   Heart Rate: 87 Resp: 16 SpO2: 96 % O2 Device: None (Room air)    Vitals:    04/22/19 1245 04/23/19 0400 04/24/19 0530   Weight: 211 lb 13.8 oz (96.1 kg) 217 lb 13 oz (98.8 kg) 215 lb 12.8 oz (97.9 kg)     Vital Signs with Ranges  Temp:  [97.8  F (36.6  C)-98.3  F (36.8  C)] 97.9  F (36.6  C)  Heart Rate:  [76-91] 87  Resp:  [16-20] 16  BP: (122-140)/(84-92) 122/90  SpO2:  [95 %-97 %] 96 %  I/O last 3 completed shifts:  In: 1220 [P.O.:420; I.V.:800]  Out: 1900 [Urine:1900]    Heart Rate: 87, Blood pressure 122/90, pulse 76, temperature 97.9  F (36.6  C), temperature source Oral, resp. rate 16, height 5' 10\" (1.778 m), weight 215 lb 12.8 oz (97.9 kg), SpO2 96 %.  215 lbs 12.8 oz  HEENT:  Normocephalic, atraumatic.  PERRLA.  EOM s intact.    Neck:  Supple, non-tender, without lymphadenopathy.  Heart:  No peripheral edema  Lungs:  No SOB  Skin:  Warm and dry, " good capillary refill.  Extremities:  Good radial and dorsalis pedis pulses bilaterally, no edema, cyanosis or clubbing.    NEUROLOGICAL EXAMINATION:   Mental status:  Alert and Oriented x 3, speech is fluent.  Cranial nerves:  II-XII intact.   Motor:  TOMLINSON equally  Sensation:  intact    Medications     - MEDICATION INSTRUCTIONS -       - MEDICATION INSTRUCTIONS -         amLODIPine  5 mg Oral Daily     cyclobenzaprine  10 mg Oral TID     levETIRAcetam  500 mg Intravenous Q12H     lidocaine  1 patch Transdermal Q24h    And     lidocaine   Transdermal Q24H    And     lidocaine   Transdermal Q8H     sodium chloride (PF)  3 mL Intracatheter Q8H       Data   CBC RESULTS:   Recent Labs   Lab Test 04/26/19  0721   WBC 7.0   RBC 5.02   HGB 14.9   HCT 43.6   MCV 87   MCH 29.7   MCHC 34.2   RDW 14.1        Basic Metabolic Panel:  Lab Results   Component Value Date     04/26/2019      Lab Results   Component Value Date    POTASSIUM 3.8 04/26/2019     Lab Results   Component Value Date    CHLORIDE 108 04/26/2019     Lab Results   Component Value Date    ALAN 8.9 04/26/2019     Lab Results   Component Value Date    CO2 25 04/26/2019     Lab Results   Component Value Date    BUN 14 04/26/2019     Lab Results   Component Value Date    CR 0.91 04/26/2019     Lab Results   Component Value Date     04/26/2019     INR:  Lab Results   Component Value Date    INR 1.04 04/24/2019    INR 1.17 04/22/2019    INR 1.01 04/30/2013

## 2019-04-26 NOTE — IR NOTE
Report called to the Charge Nurse on ICU, LOLITA Pringle. Right groin CDI, soft. 6F angioseal in place. Covered with a gauze and tegaderm. Pt will be transferring after PACU to [facility].   Pt out of room at 1645

## 2019-04-26 NOTE — BRIEF OP NOTE
[facility]     Endovascular Surgical Neuroradiology Post-Procedure Note    Pre-Procedure Diagnosis:  Left occipital dAVF  Post-Procedure Diagnosis:  Left occipital dAVF    Procedure(s):   Endovascular treatment of arteriovenous fistula    Findings:  Left occipital dAVF Cognard type III. Fed by occipital meningeal branch as well as posterior MMA with drainage into multiple cortical veins. Devonte 18 embolization of occipital meningeal branch as well as posterior MMA with penetration into venous drainage. Post embolization, there is complete obliteration of dAVF. Right CFA closure with Angio-Seal.    Plan:      -Admit to ICU  -SBP < 120 for 24 hours; Nicardipine prn  -Neuro checks q1h    Primary Surgeon:  Raffy Thompson MD  Secondary Surgeon:  Not applicable  Secondary Surgeon Review:  None  Fellow:  None  Additional Assistants:  None    Prior to the start of the procedure and with procedural staff participation, I verbally confirmed: the patient s identity using two indicators, relevant allergies, that the procedure was appropriate and matched the consent or emergent situation, and that the correct equipment/implants were available. Immediately prior to starting the procedure I conducted the Time Out with the procedural staff and re-confirmed the patient s name, procedure, and site/side. (The Joint Commission universal protocol was followed.)  Yes    PRU value: Not applicable    Anesthesia:  Performed by Anesthesia  Medications:  Heparin, Lidocaine  Puncture site:  Right Femoral Artery    Fluoroscopy time (minutes):  56.2  Radiation dose (mGy):  3377.73    Contrast amount (mL):  110     Estimated blood loss (mL):  50    Closure:  Device     Raffy Thompson  Pager:  [phone]

## 2019-04-26 NOTE — PLAN OF CARE
Intracerebral hemorrhage left pariental and occipital spontanous intraparenchymal hemorrhage.  Disoriented to situation at times/forgetful. Neuros - calm, cooperative, following directions, denies any numbness/tingling, moving all extremities, corrective lenses on and helpful. VSS.NPO for embolization procedure at 1 pm today, gave report to pre-op at 1130, plan to leave floor at 1200. Took am pills with sips of water.  Tylenol prn/flexeril scheduled helpful with back pain (greater on right lower than left). Hinson patent/darker urine.  BM yesterday per patient.  Wife and mother at bedside.

## 2019-04-26 NOTE — PLAN OF CARE
Discharge Planner OT   Patient plan for discharge: None stated, anticipate rehab  Current status:  pt Dagmar supine to sit EOB, mod A sit to stand, amb with FWW cues for walker safety with proximity to self and min/mod A to/from bathroom, pt fatigue and not able to tolerate standing at sink, returned to chair where pt setup to complete oral cares. Pt /83  Barriers to return to prior living situation: Cognition, motor planning, mobility, pain, ADL and IADL performance  Recommendations for discharge: TCU per plan established by the Occupational Therapist  Rationale for recommendations: Pt is well below baseline and experiencing severe lower back pain limiting ability to participate in mobility and ADLs. And will require continued therapy to improve safety and independence in ADLs, IADLs and mobility         Entered by: Seema Jamil 04/26/2019 12:18 PM

## 2019-04-26 NOTE — IR NOTE
Interventional Radiology Intra-procedural Nursing Note    Patient Name: Apolinar Mcgregor  Medical Record Number: [id number]  Today's Date: April 26, 2019    Start Time: 1346  Procedure: Embolization of Dural Arteriovenous Fistula   Report given to: LOLITA Telles in IR    **Procedure performed under Anesthesia**  See anesthesia documentation for vitals flowsheet and medication administration.    Pt. transferred to IR table. Prepped and draped appropriately. Monitoring equipment applied. Timeout recorded.    6f sheath to Right Femoral Artery.    Throughout case no orders received for ACT despite inquiry from RN and CRNA.

## 2019-04-26 NOTE — PROGRESS NOTES
[facility]  Hospitalist Progress Note    Assessment & Plan   Apolinar Mcgregor is a 58 year old male with a history of HTN who presented to the ED with altered mental status and was admitted 4/22/19 with acute parenchymal hemorrhage.    Acute parenchymal hemorrhage  Left parietal and occipital spontaneous intraparenchymal hemorrhage due to dural AVF  Cerebral edema secondary to above  AMS due to above.  Resolved   Patient initially presented with AMS.  Found to have an acute parenchymal hemorrhage in the left parietal and occipital lobs measuring up to 4.7 cm with 4 mm rightward midline shift.  Wife does not know of any traumatic head injuries.  Does have a history of HTN but blood pressure well controlled on presentation.  UDS negative.  ED did speak with neurosurgery who recommended admission to ICU and continued monitoring.  Repeat head CT showed stable hemorrhage.  MRI of the brain on 4/23 showed hemorrhage and mass-effect were stable and without any obvious masses or abnormalities.  Angiogram showed a left occipital dural aVF.  Repeat CT head showed stable hemorrhage   MRI brain on 4/23 showed the hemorrhage and mass effect were stable and without any obvious masses or abnormalities  - Neuro checks q2h   - Goal SBP <140 with PRN Labetalol and Hydralazine  - PT/OT/Speech consulted.  Likely TCU versus ARU at discharge.  - Neurosurgery and neurology consulted.  Appreciate their assistance.  No plans for surgical intervention at this time.  Underwent cerebral angiogram.    -Plan for dAVF embolization today (4/26)     Anion gap metabolic acidosis, resolved   Given the bleed it is possible the patient had a seizure and this could have elevated his lactate.      Hypertension   Blood pressures well controlled at this time without intervention  - Restarted PTA Norvasc     Orders Placed This Encounter      NPO per Anesthesia Guidelines for Procedure/Surgery Except for: Meds    DVT prophylaxis: Pneumatic  Compression Devices  Code Status: Full Code    Disposition: Expected discharge to TCU versus ARU in 2-3 days once neurologically stable.    Vi Armando MD  [phone] (7am - 6pm)  Text Page  ~~~~~~~~~~~~~~~~~~~~~~~~~~~~~~~~~~~~~~~~~~~~~~~  Interval History   Patient new to me today.  Chart reviewed. No new complaints. Off to procedure soon.     -Data reviewed today: I reviewed all new labs and imaging results over the last 24 hours.     Physical Exam   Temp: 97.9  F (36.6  C) Temp src: Oral BP: 122/90   Heart Rate: 87 Resp: 16 SpO2: 96 % O2 Device: None (Room air)    Vitals:    04/22/19 1245 04/23/19 0400 04/24/19 0530   Weight: 96.1 kg (211 lb 13.8 oz) 98.8 kg (217 lb 13 oz) 97.9 kg (215 lb 12.8 oz)     Vital Signs with Ranges  Temp:  [97.8  F (36.6  C)-98.3  F (36.8  C)] 97.9  F (36.6  C)  Heart Rate:  [75-91] 87  Resp:  [16-20] 16  BP: (122-140)/(84-92) 122/90  SpO2:  [95 %-97 %] 96 %  I/O last 3 completed shifts:  In: 1220 [P.O.:420; I.V.:800]  Out: 1900 [Urine:1900]    Constitutional: Alert, NAD, pleasant and interactive  HEENT: mmm, sclerae anicteric  Respiratory: Lungs CTAB, no wheezes or crackles  Cardiovascular: RRR, no murmurs  GI: soft, non-tender, nondistended  Skin/Integument: warm, dry, no acute rashes  Musc: TOMLINSON, normal limb strength x 4  Neuro: AOx3, no tremors or focal deficits, speech fluent  Psych: not anxious, no confusion      Medications     - MEDICATION INSTRUCTIONS -       - MEDICATION INSTRUCTIONS -         amLODIPine  5 mg Oral Daily     cyclobenzaprine  10 mg Oral TID     levETIRAcetam  500 mg Intravenous Q12H     lidocaine  1 patch Transdermal Q24h    And     lidocaine   Transdermal Q24H    And     lidocaine   Transdermal Q8H     sodium chloride (PF)  3 mL Intracatheter Q8H       Data   Recent Labs   Lab 04/26/19  0721 04/25/19  0718 04/24/19  1025 04/24/19  0737  04/22/19  1050   WBC 7.0 6.0 6.5 7.7   < > 14.1*   HGB 14.9 15.0 15.5 15.4   < > 16.8   MCV 87 87 87 87   < > 91   PLT  158 136* 150 156   < > 191   INR  --   --  1.04  --   --  1.17*    141  --  140   < > 144   POTASSIUM 3.8 3.9  --  3.7   < > 3.8   CHLORIDE 108 109  --  107   < > 106   CO2 25 24  --  28   < > 14*   BUN 14 11  --  9   < > 20   CR 0.91 0.90 0.91 0.94   < > 1.10   ANIONGAP 6 8  --  5   < > 24*   ALAN 8.9 8.9  --  9.0   < > 9.5   * 114*  --  110*   < > 178*   ALBUMIN  --   --   --   --   --  4.1   PROTTOTAL  --   --   --   --   --  8.5   BILITOTAL  --   --   --   --   --  0.4   ALKPHOS  --   --   --   --   --  101   ALT  --   --   --   --   --  43   AST  --   --   --   --   --  25    < > = values in this interval not displayed.       Imaging:  No results found for this or any previous visit (from the past 24 hour(s)).\

## 2019-04-26 NOTE — PLAN OF CARE
7-11pm: A&O x3, disoriented to date. Forgetful. Other neuros intact. Pt refuses to keep HOB at 30 degrees despite frequent education. VSS. Tele NSR. DD2 with thin liquid diet. Up with 2 assist to BSC. Seizure precautions maintained. Back pain managed with prn tylenol, flexeril, and heat pack with some relief. Plan for embolization of dural AV fistula tomorrow.

## 2019-04-27 ENCOUNTER — APPOINTMENT (OUTPATIENT)
Dept: OCCUPATIONAL THERAPY | Facility: CLINIC | Age: 59
DRG: 020 | End: 2019-04-27
Payer: COMMERCIAL

## 2019-04-27 ENCOUNTER — APPOINTMENT (OUTPATIENT)
Dept: SPEECH THERAPY | Facility: CLINIC | Age: 59
DRG: 020 | End: 2019-04-27
Payer: COMMERCIAL

## 2019-04-27 ENCOUNTER — APPOINTMENT (OUTPATIENT)
Dept: PHYSICAL THERAPY | Facility: CLINIC | Age: 59
DRG: 020 | End: 2019-04-27
Payer: COMMERCIAL

## 2019-04-27 LAB
ANION GAP SERPL CALCULATED.3IONS-SCNC: 7 MMOL/L (ref 3–14)
BUN SERPL-MCNC: 12 MG/DL (ref 7–30)
CALCIUM SERPL-MCNC: 8.4 MG/DL (ref 8.5–10.1)
CHLORIDE SERPL-SCNC: 112 MMOL/L (ref 94–109)
CO2 SERPL-SCNC: 24 MMOL/L (ref 20–32)
CREAT SERPL-MCNC: 0.69 MG/DL (ref 0.66–1.25)
ERYTHROCYTE [DISTWIDTH] IN BLOOD BY AUTOMATED COUNT: 14.1 % (ref 10–15)
GFR SERPL CREATININE-BSD FRML MDRD: >90 ML/MIN/{1.73_M2}
GLUCOSE BLDC GLUCOMTR-MCNC: 110 MG/DL (ref 70–99)
GLUCOSE BLDC GLUCOMTR-MCNC: 111 MG/DL (ref 70–99)
GLUCOSE BLDC GLUCOMTR-MCNC: 124 MG/DL (ref 70–99)
GLUCOSE SERPL-MCNC: 136 MG/DL (ref 70–99)
HCT VFR BLD AUTO: 39 % (ref 40–53)
HGB BLD-MCNC: 13.6 G/DL (ref 13.3–17.7)
MCH RBC QN AUTO: 30.4 PG (ref 26.5–33)
MCHC RBC AUTO-ENTMCNC: 34.9 G/DL (ref 31.5–36.5)
MCV RBC AUTO: 87 FL (ref 78–100)
PLATELET # BLD AUTO: 173 10E9/L (ref 150–450)
POTASSIUM SERPL-SCNC: 4.2 MMOL/L (ref 3.4–5.3)
RBC # BLD AUTO: 4.47 10E12/L (ref 4.4–5.9)
SODIUM SERPL-SCNC: 143 MMOL/L (ref 133–144)
WBC # BLD AUTO: 9.3 10E9/L (ref 4–11)

## 2019-04-27 PROCEDURE — 97530 THERAPEUTIC ACTIVITIES: CPT | Mod: GO | Performed by: OCCUPATIONAL THERAPIST

## 2019-04-27 PROCEDURE — 25000128 H RX IP 250 OP 636: Performed by: INTERNAL MEDICINE

## 2019-04-27 PROCEDURE — 25000132 ZZH RX MED GY IP 250 OP 250 PS 637: Performed by: INTERNAL MEDICINE

## 2019-04-27 PROCEDURE — 92526 ORAL FUNCTION THERAPY: CPT | Mod: GN | Performed by: SPEECH-LANGUAGE PATHOLOGIST

## 2019-04-27 PROCEDURE — 97116 GAIT TRAINING THERAPY: CPT | Mod: GP | Performed by: PHYSICAL THERAPIST

## 2019-04-27 PROCEDURE — 25000128 H RX IP 250 OP 636: Performed by: PSYCHIATRY & NEUROLOGY

## 2019-04-27 PROCEDURE — 36415 COLL VENOUS BLD VENIPUNCTURE: CPT | Performed by: INTERNAL MEDICINE

## 2019-04-27 PROCEDURE — 97530 THERAPEUTIC ACTIVITIES: CPT | Mod: GP | Performed by: PHYSICAL THERAPIST

## 2019-04-27 PROCEDURE — 99233 SBSQ HOSP IP/OBS HIGH 50: CPT | Performed by: INTERNAL MEDICINE

## 2019-04-27 PROCEDURE — 12000000 ZZH R&B MED SURG/OB

## 2019-04-27 PROCEDURE — 25000132 ZZH RX MED GY IP 250 OP 250 PS 637: Performed by: STUDENT IN AN ORGANIZED HEALTH CARE EDUCATION/TRAINING PROGRAM

## 2019-04-27 PROCEDURE — 00000146 ZZHCL STATISTIC GLUCOSE BY METER IP

## 2019-04-27 PROCEDURE — 85027 COMPLETE CBC AUTOMATED: CPT | Performed by: INTERNAL MEDICINE

## 2019-04-27 PROCEDURE — 97535 SELF CARE MNGMENT TRAINING: CPT | Mod: GO | Performed by: OCCUPATIONAL THERAPIST

## 2019-04-27 PROCEDURE — 25000125 ZZHC RX 250: Performed by: INTERNAL MEDICINE

## 2019-04-27 PROCEDURE — 80048 BASIC METABOLIC PNL TOTAL CA: CPT | Performed by: INTERNAL MEDICINE

## 2019-04-27 PROCEDURE — 97110 THERAPEUTIC EXERCISES: CPT | Mod: GP | Performed by: PHYSICAL THERAPIST

## 2019-04-27 PROCEDURE — 99232 SBSQ HOSP IP/OBS MODERATE 35: CPT | Performed by: PSYCHIATRY & NEUROLOGY

## 2019-04-27 PROCEDURE — 25000132 ZZH RX MED GY IP 250 OP 250 PS 637: Performed by: PHYSICIAN ASSISTANT

## 2019-04-27 PROCEDURE — 25800030 ZZH RX IP 258 OP 636: Performed by: PSYCHIATRY & NEUROLOGY

## 2019-04-27 RX ORDER — DOCUSATE SODIUM 100 MG/1
100 CAPSULE, LIQUID FILLED ORAL 2 TIMES DAILY PRN
Status: DISCONTINUED | OUTPATIENT
Start: 2019-04-27 | End: 2019-04-29 | Stop reason: HOSPADM

## 2019-04-27 RX ORDER — CALCIUM CARBONATE 500 MG/1
500 TABLET, CHEWABLE ORAL DAILY PRN
Status: DISCONTINUED | OUTPATIENT
Start: 2019-04-27 | End: 2019-04-29 | Stop reason: HOSPADM

## 2019-04-27 RX ORDER — POLYETHYLENE GLYCOL 3350 17 G/17G
17 POWDER, FOR SOLUTION ORAL 2 TIMES DAILY PRN
Status: DISCONTINUED | OUTPATIENT
Start: 2019-04-27 | End: 2019-04-29 | Stop reason: HOSPADM

## 2019-04-27 RX ORDER — HEPARIN SODIUM 5000 [USP'U]/.5ML
5000 INJECTION, SOLUTION INTRAVENOUS; SUBCUTANEOUS EVERY 12 HOURS
Status: DISCONTINUED | OUTPATIENT
Start: 2019-04-27 | End: 2019-04-29

## 2019-04-27 RX ORDER — NICOTINE POLACRILEX 4 MG
15-30 LOZENGE BUCCAL
Status: DISCONTINUED | OUTPATIENT
Start: 2019-04-27 | End: 2019-04-29 | Stop reason: HOSPADM

## 2019-04-27 RX ORDER — LIDOCAINE HYDROCHLORIDE 20 MG/ML
JELLY TOPICAL ONCE
Status: COMPLETED | OUTPATIENT
Start: 2019-04-27 | End: 2019-04-27

## 2019-04-27 RX ORDER — LIDOCAINE HYDROCHLORIDE 20 MG/ML
JELLY TOPICAL ONCE
Status: DISCONTINUED | OUTPATIENT
Start: 2019-04-27 | End: 2019-04-27

## 2019-04-27 RX ORDER — DEXTROSE MONOHYDRATE 25 G/50ML
25-50 INJECTION, SOLUTION INTRAVENOUS
Status: DISCONTINUED | OUTPATIENT
Start: 2019-04-27 | End: 2019-04-29 | Stop reason: HOSPADM

## 2019-04-27 RX ADMIN — SODIUM CHLORIDE: 9 INJECTION, SOLUTION INTRAVENOUS at 04:26

## 2019-04-27 RX ADMIN — LEVETIRACETAM 500 MG: 5 INJECTION INTRAVENOUS at 22:10

## 2019-04-27 RX ADMIN — ACETAMINOPHEN 650 MG: 325 TABLET, FILM COATED ORAL at 05:42

## 2019-04-27 RX ADMIN — OMEPRAZOLE 40 MG: 20 CAPSULE, DELAYED RELEASE ORAL at 05:42

## 2019-04-27 RX ADMIN — ACETAMINOPHEN 650 MG: 325 TABLET, FILM COATED ORAL at 14:03

## 2019-04-27 RX ADMIN — LEVETIRACETAM 500 MG: 5 INJECTION INTRAVENOUS at 10:01

## 2019-04-27 RX ADMIN — LIDOCAINE HYDROCHLORIDE: 20 JELLY TOPICAL at 22:11

## 2019-04-27 RX ADMIN — CALCIUM CARBONATE (ANTACID) CHEW TAB 500 MG 500 MG: 500 CHEW TAB at 05:42

## 2019-04-27 RX ADMIN — LIDOCAINE 1 PATCH: 560 PATCH PERCUTANEOUS; TOPICAL; TRANSDERMAL at 20:19

## 2019-04-27 RX ADMIN — ACETAMINOPHEN 650 MG: 325 TABLET, FILM COATED ORAL at 22:29

## 2019-04-27 RX ADMIN — SENNOSIDES AND DOCUSATE SODIUM 2 TABLET: 8.6; 5 TABLET ORAL at 10:01

## 2019-04-27 RX ADMIN — HYDRALAZINE HYDROCHLORIDE 10 MG: 20 INJECTION INTRAMUSCULAR; INTRAVENOUS at 03:11

## 2019-04-27 RX ADMIN — ACETAMINOPHEN 650 MG: 325 TABLET, FILM COATED ORAL at 02:24

## 2019-04-27 RX ADMIN — POLYETHYLENE GLYCOL 3350 17 G: 17 POWDER, FOR SOLUTION ORAL at 17:49

## 2019-04-27 RX ADMIN — CYCLOBENZAPRINE HYDROCHLORIDE 10 MG: 10 TABLET, FILM COATED ORAL at 22:11

## 2019-04-27 RX ADMIN — LABETALOL 20 MG/4 ML (5 MG/ML) INTRAVENOUS SYRINGE 10 MG: at 07:36

## 2019-04-27 RX ADMIN — ACETAMINOPHEN 650 MG: 325 TABLET, FILM COATED ORAL at 09:05

## 2019-04-27 RX ADMIN — HEPARIN SODIUM 5000 UNITS: 5000 INJECTION, SOLUTION INTRAVENOUS; SUBCUTANEOUS at 20:19

## 2019-04-27 RX ADMIN — AMLODIPINE BESYLATE 5 MG: 5 TABLET ORAL at 08:09

## 2019-04-27 RX ADMIN — CYCLOBENZAPRINE HYDROCHLORIDE 10 MG: 10 TABLET, FILM COATED ORAL at 17:49

## 2019-04-27 RX ADMIN — CYCLOBENZAPRINE HYDROCHLORIDE 10 MG: 10 TABLET, FILM COATED ORAL at 08:09

## 2019-04-27 ASSESSMENT — ACTIVITIES OF DAILY LIVING (ADL)
ADLS_ACUITY_SCORE: 14
ADLS_ACUITY_SCORE: 14
ADLS_ACUITY_SCORE: 15
ADLS_ACUITY_SCORE: 15
ADLS_ACUITY_SCORE: 14
ADLS_ACUITY_SCORE: 14

## 2019-04-27 ASSESSMENT — MIFFLIN-ST. JEOR: SCORE: 1802.25

## 2019-04-27 NOTE — PLAN OF CARE
Discharge Planner PT   Patient plan for discharge: None stated  Current status: SBA sup>sit with use of bed rail and elevated HOB. Sit<>Stand close SBA. Pt is very slow with each movement, processing seems slow with command following. Ambulated 150' with WW and CGA, used as step to pattern to maintain stepping sequence and walker management, otherwise pt a bit overwhelmed with use of the walker and how to guide it.  Of note pt did not remember getting up to the chair this am with OT, states he had been in bed all day long. Adamant that it was yesterday he was in the chair. Re-oriented pt to time of day and floor change (ICU to73) this PM.  Barriers to return to prior living situation: Decreased balance and coordination inconsistency, impaired activity tolerance and strength from baseline.  Recommendations for discharge: Acute Rehab  Rationale for recommendations: Pt will benefit from continued skilled rehab services to progress independence and safety with functional mobility. Pt will be able to tolerate 3 hrs of therapy per day.       Entered by: Radha Myles 04/27/2019 5:46 PM

## 2019-04-27 NOTE — PROGRESS NOTES
Endovascular Surgical Neuroradiology Note    Patient with ruptured left occipital dAVF s/p embolization.    -Can liberalize BP goal today  -Will arrange for clinic visit in 1 month to discuss follow up angiogram    Raffy Thompson MD

## 2019-04-27 NOTE — PROVIDER NOTIFICATION
Paged Hospitalist  Regarding pt requesting Prilosec to be restarted right way without further wait, since nothing else works for him. Will await for orders to be restarted.

## 2019-04-27 NOTE — PROGRESS NOTES
"Pt. neuros remain intact. Denies headache. Reports of lower back pain and states he has \"arthritis of the lower back\".  Pain decreased to a 2--3 with tylenol.Nicardipine to keep SBP less than 120.  Rt femiral site dressing clean/dry/intact and WDL.  Glucose 168 and reported to DAVID Bond MD>   "

## 2019-04-27 NOTE — PROGRESS NOTES
[facility]  Hospitalist Progress Note    Assessment & Plan   Apolinar Mcgregor is a 58 year old male with a history of HTN who presented to the ED with altered mental status and was admitted 4/22/19 with acute parenchymal hemorrhage.    Acute parenchymal hemorrhage  Left parietal and occipital spontaneous intraparenchymal hemorrhage due to dural AVF  Cerebral edema secondary to above  AMS due to above.  Resolved   Patient initially presented with AMS.  Found to have an acute parenchymal hemorrhage in the left parietal and occipital lobs measuring up to 4.7 cm with 4 mm rightward midline shift.  Wife does not know of any traumatic head injuries.  Does have a history of HTN but blood pressure well controlled on presentation.  UDS negative.  ED did speak with neurosurgery who recommended admission to ICU and continued monitoring.  Repeat head CT showed stable hemorrhage.  MRI of the brain on 4/23 showed hemorrhage and mass-effect were stable and without any obvious masses or abnormalities.  Angiogram showed a left occipital dural aVF.  Repeat CT head showed stable hemorrhage   MRI brain on 4/23 showed the hemorrhage and mass effect were stable and without any obvious masses or abnormalities  - Goal SBP <160 with PRN Labetalol and Hydralazine  - PT/OT/Speech consulted.  Likely TCU versus ARU at discharge.  - Neurosurgery and neurology consulted.  Appreciate their assistance.  No plans for surgical intervention at this time. Underwent cerebral angiogram.    -s/p dAVF embolization on 4/26 and admitted to ICU  -q4 hour neuro checks    Anion gap metabolic acidosis, resolved   Given the bleed it is possible the patient had a seizure and this could have elevated his lactate.      Hypertension   Blood pressures well controlled at this time without intervention  - Restarted PTA Norvasc     Orders Placed This Encounter      Moderate Consistent CHO Diet    DVT prophylaxis: Pneumatic Compression Devices  Code Status:  Full Code    Disposition: Stable for transfer out of ICU. Expected discharge to ARU once neurologically stable, hopefully 1-2 days.     Vi Armando MD  [phone] (7am - 6pm)  Text Page  ~~~~~~~~~~~~~~~~~~~~~~~~~~~~~~~~~~~~~~~~~~~~~~~  Interval History   Doing well today. No complaints. Consultant notes reviewed. Denies palpitations, chest pain, shortness of breath, limb pains, vision changes.    -Data reviewed today: I reviewed all new labs and imaging results over the last 24 hours.     Physical Exam   Temp: 97.9  F (36.6  C) Temp src: Oral BP: 107/74 Pulse: 91 Heart Rate: 93 Resp: 16 SpO2: 96 % O2 Device: None (Room air) Oxygen Delivery: 3 LPM  Vitals:    04/23/19 0400 04/24/19 0530 04/27/19 0500   Weight: 98.8 kg (217 lb 13 oz) 97.9 kg (215 lb 12.8 oz) 97.6 kg (215 lb 2.7 oz)     Vital Signs with Ranges  Temp:  [97.9  F (36.6  C)-98.3  F (36.8  C)] 97.9  F (36.6  C)  Pulse:  [81-99] 91  Heart Rate:  [] 93  Resp:  [0-40] 16  BP: ()/(24-92) 107/74  SpO2:  [19 %-100 %] 96 %  I/O last 3 completed shifts:  In: 3827.08 [P.O.:300; I.V.:3527.08]  Out: 2615 [Urine:2615]  Constitutional: Alert, NAD, pleasant and interactive  HEENT: mmm, sclerae anicteric  Respiratory:     Lungs CTAB, no wheezes or crackles  Cardiovascular: RRR, no murmurs  GI: soft, non-tender, nondistended  Skin/Integument: warm, dry, no acute rashes  Neuro: AOx3, no tremors or focal deficits, speech fluent  Psych: not anxious, no confusion     Medications     - MEDICATION INSTRUCTIONS -       niCARdipine 40 mg in 200 mL 0.9% NaCl Stopped (04/26/19 2300)     sodium chloride 100 mL/hr at 04/27/19 0426       amLODIPine  5 mg Oral Daily     cyclobenzaprine  10 mg Oral TID     levETIRAcetam  500 mg Intravenous Q12H     lidocaine  1 patch Transdermal Q24h    And     lidocaine   Transdermal Q24H    And     lidocaine   Transdermal Q8H       Data   Recent Labs   Lab 04/27/19  0452 04/26/19  0721 04/25/19  0718 04/24/19  1025  04/22/19  1050    WBC 9.3 7.0 6.0 6.5   < > 14.1*   HGB 13.6 14.9 15.0 15.5   < > 16.8   MCV 87 87 87 87   < > 91    158 136* 150   < > 191   INR  --   --   --  1.04  --  1.17*    139 141  --    < > 144   POTASSIUM 4.2 3.8 3.9  --    < > 3.8   CHLORIDE 112* 108 109  --    < > 106   CO2 24 25 24  --    < > 14*   BUN 12 14 11  --    < > 20   CR 0.69 0.91 0.90 0.91   < > 1.10   ANIONGAP 7 6 8  --    < > 24*   ALAN 8.4* 8.9 8.9  --    < > 9.5   * 127* 114*  --    < > 178*   ALBUMIN  --   --   --   --   --  4.1   PROTTOTAL  --   --   --   --   --  8.5   BILITOTAL  --   --   --   --   --  0.4   ALKPHOS  --   --   --   --   --  101   ALT  --   --   --   --   --  43   AST  --   --   --   --   --  25    < > = values in this interval not displayed.       Imaging:  Recent Results (from the past 24 hour(s))   IR Carotid Cerebral Angiogram Left    Narrative    Date of Study: 2019  MRN: [id number]  Name: Apolinar Mcgregor  : 1960                                                               PREOPERATIVE DIAGNOSIS: Left occipital dural arteriovenous fistula,  Cognard type 3.  POSTOPERATIVE DIAGNOSIS: Left occipital dural arteriovenous fistula,  Cognard type 3.    TITLE OF PROCEDURES:  1. Catheterization of right common femoral artery.   2. Catheterization of left common carotid artery.  3. Microcatheterization of left external carotid artery.  4. Microcatheterization of left occipital artery.  5. Microcatheterization of left middle meningeal artery.  6. Devonte embolization of left occipital dAVF through left occipital  artery and left middle meningeal artery.   7. Interpretation of films.     ATTENDING: Raffy Thompson MD     ANESTHESIA: General anesthesia was provided by the anesthesia staff.  Medications were administered by the anesthesia staff. His vital signs  were monitored continuously by anesthesia staff throughout the  procedure.  Please see anesthesia note for further details.    FLUORO TIME: 56.2 minutes (Total  3377.73 mGy)  CONTRAST: 110 ml Isovue     HISTORY OF PRESENT ILLNESS: Mr. Mcgregor is a 58 year old man who  presents with confusion and probable new onset seizure. He was found  to have left occipital-parietal intracerebral hemorrhage. He underwent  cerebral angiogram which revealed left occipital dural arteriovenous  fistula, Cognard type III. He presents for endovascular embolization  treatment of dural arteriovenous fistula.    DESCRIPTION OF PROCEDURE: The patient was placed in supine position on  the angiography table. He was intubated by the anesthesia staff. A  timeout was performed. We prepared and draped in the usual sterile  fashion. The right common femoral artery was palpated. A 21 gauge  needle was used to puncture the right common femoral artery. A 6  Turkish sheath was placed. Through the right groin sheath, Envoy MPD  was advanced over a glidewire and the left common carotid artery was  selected. Next, the wire was navigated into the ECA and occipital  branch. Next, we prepared 5 Turkish Deann catheter over a DUO  microcatheter over a Synchro-2 microwire. The microcatheter was  navigated into the meningeal occipital artery branch. The wire was  removed. A microinjection was performed. We navigated into the  anterior branch of the meningeal branch but was not able to advance  distal due to tortuosity of the vessel. We navigated into the  posterior branch of the meningeal occipital artery. The wire was  removed. A microinjection was performed. DMSO was prepared and 0.4 cc  was injected. Devonte 18 was prepared and injected slowly to fill the  dAVF feeder into the fistulous point. We were able to fill 80% of the  fistulous vein. We noted reflux so removed the microcatheter. An  occipital artery injection was performed. There was persistent  meningeal occipital branch filling of the fistula. We prepared DUO  microcatheter over a [place] 010 microwire and navigated the  microcatheter into the persistent  meningeal occipital branch. We again  injected Onxy 18 using steps previously. There was reflux noted into  the main occipital branch so injection was stopped and the  microcatheter was removed. An intracranial run was performed. There  was no occipital artery supply to the fistula but posterior MMA  continued to supply the fistula. Next, we preapred DUO microcatheter  over a [place] 008 microwire. We navigated the microcatheter into the  MMA. We were able to navigate into the distal posterior MMA near the  fistula. We removed the microwire. A microinjection was performed.  [place] 18 was injected using previous steps. There was good penetration  into the fistulous vein. We removed the microcatheter. An intracranial  run was performed. We retracted the guide catheter into the CCA and  intracranial run was performed. The catheter was removed. Right groin  sheath was closed with Angio-Seal system. A sterile dressing was  applied. Blood loss was 50 cc. Needle counts were correct at the end  of the case. I was present for the entire procedure including the key  portions.     INTERPRETATION OF FILMS:   1. Left occipital artery microinjection, AP and lateral,  intracranially x 2: These views show multiple branches of the  meningeal occipital artery feeders into the occipital fistulous point.  There is 2 cortical venous drainage visualized by the parietal  cortical as well as superficial middle cerebral vein. The second  images show advancement of the microcatheter near to the fistula.  There is no contrast extravasation.  2. Left occipital artery injection, AP and lateral, intracranially  post first embolization: These views show persistent meningeal  occipital artery supply to the fistula. There is delayed flow into the  fistula as compared to the initial runs. There is no contrast  extravasation.   3. Left occipital artery microinjection, AP and lateral,  intracranially: These views show the microcatheter in the  meningeal  occipital branch persistently filling the fistula. There is same  drainage pattern as visualized prior.    4. Left occipital artery injection, AP and lateral, intracranially  post second embolization x 4: These views show complete occlusion of  the meningeal occipital branch. The main trunk of the occipital artery  is patent. There is no other feeder from the occipital artery into the  fistula. There is reflux into the external carotid artery with  visualization of posterior middle meningeal artery supply to the  fistula. The flow into the fistula is severely reduced with only one  cortical venous drainage. There is no contrast extravasation.  5. Left middle meningeal artery microinjection, AP and lateral,  intracranially x 3: These views show posterior meningeal artery supply  to the fistula. There is one parietal cortical venous drainage  visualized.   6. Left middle meningeal artery injection, AP and lateral,  intracranially post third embolization: These views show complete  occlusion of the distal posterior meningeal artery. There is Devonte  penetration noted into the fistulous vein. There is no contrast  extravasation.  7. Left external carotid artery injection, AP and lateral,  intracranially final run: These views show normal ECA. There is no  visualization of left occipital dural arteriovenous fistula. There is  occlusions visualized at the meningeal occipital artery branch as well  as posterior middle meningeal branch due to Devonte. There is no other  vessel dropouts. There is no contrast extravsation  8. Left  common carotid artery injection, AP and lateral,  intracranially final run: These views show normal ICA. There is there  normal MCA and KINGSLEY. There is no vessel dropouts. Again, there is no  visualization occipital dAVF.  9. Right common femoral artery runoff: These views show right groin  sheath placement into the right common femoral artery. There is no  contrast extravasation or  dissection.      Impression    IMPRESSION:     1) Left occipital dAVF Cognard type III, fed by occipital meningeal  branch as well as posterior MMA with drainage into multiple cortical  veins.   2) Devonte -8 embolization of occipital meningeal branch as well as  posterior MMA with penetration into fistulous vein.   3) Post embolization, there is complete obliteration of dAVF.    JANY ANDUJAR MD

## 2019-04-27 NOTE — PLAN OF CARE
Discharge Planner OT   Patient plan for discharge: none stated  Current status: supine to sit with CGA, sit <> stand with CGA/min A and step by step cues with ww. Pt reports dizziness initially and then subsided. Pt BP WFL,119/79. Pt ambulated to sink with ww with CGA/miN A. Pt able to stand at sink to brush teeth and wash face with CGA for safety. Pt needing increased cues and CGA/MIN A to return to chair to sit. Pt c/o B hip pain 8/10. Pt needing to sit due to pain, nursing notified. PT alert and oriented to self, , current year and at first reports November for month, but self corrected and reported April, pt needing cues for further for details regarding situation.   Barriers to return to prior living situation: B hip pain, increased processing and needs 1 step simple commands, decreased balance, safety.   Recommendations for discharge: ARC  Rationale for recommendations: daily intensive therapy to increase ADL and functional mobility I and safety to PLOF, pt will be able to tolerate 3 hours of therapy at discharge.        Entered by: Alana Armendariz 2019 9:11 AM

## 2019-04-27 NOTE — PLAN OF CARE
Discharge Planner SLP   Patient plan for discharge: Did not state  Current status: Swallow Tx was provided this am.  Patient reports hoarse voice/throat clear, but no dysphagia since procedure 4/26.  Patient tolerated thin liquids and solids without signs of aspiration during a snack observation.  Progress made toward goal.  Recommend continued regular diet textures (ordered per MD 4/26 post procedure) and thin liquids, sit at 90 degrees, small bites/sips, alternate textures.  Plan to provide 1-2 additional swallow Tx sessions to assess diet tolerance with increased po.  Will evaluate higher level cognitive-linguistic function as able pending discharge timing.  Barriers to return to prior living situation: Cognition  Recommendations for discharge: TCU with SLP eval and Tx  Rationale for recommendations: SLP cognitive-linguistic eval and Tx to maximize function for daily needs; anticipate swallow Tx goal to be met prior to discharge       Entered by: June Oconnell 04/27/2019 9:12 AM

## 2019-04-27 NOTE — PROGRESS NOTES
Stroke-Neurocritical Care Note  Admission Summary  Apolinar Mcgregor is an 58 year old male with HTN, GERD, and urinary retention admitted on 4/22/2019 for aphasia with AMS secondary to left occipital parietal ICH.  CTA/CTV unrevealing.  MRI Brain unrevealing.  On admission patient was agitated with right lateral tongue bite most likely secondary to seizure.  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    Last 24 hours:  Embolization s/p obliteration of dAVF today by Dr. Raffy Thompson.     Impression:   1. 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 Seizure    Recommendations:  1. BP Cap 120, with q1 hour vitals  --long-term goal < 130/85  2. Q1 neurochecks in ICU  3. PT/OT/Speech  4. SCDs, DVT chemoprophylaxis     Uche Tavarez MD, MS  Neurology     Please contact the stroke-neurocritical care service with any questions: link to Text page       ____________________________________________________________________        Medications:      Current Facility-Administered Medications:      0.9% sodium chloride BOLUS, 250 mL, Intravenous, Once PRN, Orville Sorensen MD     acetaminophen (TYLENOL) tablet 650 mg, 650 mg, Oral, Q4H PRN, 650 mg at 04/26/19 2003 **OR** acetaminophen (TYLENOL) Suppository 650 mg, 650 mg, Rectal, Q4H PRN, Shruthi Watson PA-C     amLODIPine (NORVASC) tablet 5 mg, 5 mg, Oral, Daily, Orville Sorensen MD, 5 mg at 04/26/19 0925     bisacodyl (DULCOLAX) Suppository 10 mg, 10 mg, Rectal, Daily PRN, Bijan Hernandez DO, 10 mg at 04/25/19 1446     cyclobenzaprine (FLEXERIL) tablet 10 mg, 10 mg, Oral, TID, Bijan Hernandez DO, 10 mg at 04/26/19 2137     glucose gel 15-30 g, 15-30 g, Oral, Q15 Min PRN **OR** dextrose 50 % injection 25-50 mL, 25-50 mL, Intravenous, Q15 Min PRN **OR** glucagon injection 1 mg, 1 mg, Subcutaneous, Q15 Min PRN, Raffy Thompson MD     hydrALAZINE (APRESOLINE) injection 10-20 mg, 10-20 mg, Intravenous, Q4H PRN, Orville Sorensen MD      HYDROmorphone (PF) (DILAUDID) injection 0.3-0.5 mg, 0.3-0.5 mg, Intravenous, Q2H PRN, Orville Sorensen MD, 0.2 mg at 04/26/19 1937     hypromellose-dextran (ARTIFICAL TEARS) 0.1-0.3 % ophthalmic solution 2 drop, 2 drop, Ophthalmic, Q1H PRN, Orville Sorensen MD     labetalol (NORMODYNE/TRANDATE) syringe 10-20 mg, 10-20 mg, Intravenous, Q10 Min PRN, Orville Sorensen MD, 10 mg at 04/23/19 2316     levETIRAcetam (KEPPRA) intermittent infusion 500 mg, 500 mg, Intravenous, Q12H, Orville Sorensen MD, 500 mg at 04/26/19 2142     Lidocaine (LIDOCARE) 4 % Patch 1 patch, 1 patch, Transdermal, Q24h, 1 patch at 04/26/19 2003 **AND** lidocaine patch REMOVAL, , Transdermal, Q24H **AND** lidocaine patch in PLACE, , Transdermal, Q8H, Bandar Story MD     Medication Instructions: No D5W IV solutions unless patient hypoglycemic., , Does not apply, Continuous PRN, Orville Sorensen MD     metoclopramide (REGLAN) tablet 10 mg, 10 mg, Oral, Q6H PRN **OR** metoclopramide (REGLAN) injection 10 mg, 10 mg, Intravenous, Q6H PRN, Orville Sorensen MD, 10 mg at 04/26/19 2130     naloxone (NARCAN) injection 0.1-0.4 mg, 0.1-0.4 mg, Intravenous, Q2 Min PRN, Orville Sorensen MD     niCARdipine 40 mg in 200 mL 0.9% NaCl (CARDENE) infusion, 2.5-15 mg/hr, Intravenous, Continuous, Raffy Thompson MD, Stopped at 04/26/19 2300     OLANZapine (zyPREXA) injection 10 mg, 10 mg, Intramuscular, Daily PRN, Orville Sorensen MD, 10 mg at 04/22/19 1055     ondansetron (ZOFRAN-ODT) ODT tab 4 mg, 4 mg, Oral, Q6H PRN **OR** ondansetron (ZOFRAN) injection 4 mg, 4 mg, Intravenous, Q6H PRN, Orville Sorensen MD     prochlorperazine (COMPAZINE) injection 10 mg, 10 mg, Intravenous, Q6H PRN **OR** prochlorperazine (COMPAZINE) tablet 10 mg, 10 mg, Oral, Q6H PRN **OR** prochlorperazine (COMPAZINE) Suppository 25 mg, 25 mg, Rectal, Q12H PRN, Orville Sorensen MD     senna-docusate  "(SENOKOT-S/PERICOLACE) 8.6-50 MG per tablet 1-2 tablet, 1-2 tablet, Oral, BID PRN, David, Bijan Cohn, DO, 2 tablet at 04/25/19 1058     sodium chloride 0.9% infusion, , Intravenous, Continuous, Raffy Thompson MD, Last Rate: 100 mL/hr at 04/26/19 1939    Vital Signs:  B/P: 114/70, T: 98, P: 93, R: 18    /70   Pulse 93   Temp 98  F (36.7  C) (Oral)   Resp 18   Ht 1.778 m (5' 10\")   Wt 97.9 kg (215 lb 12.8 oz)   SpO2 95%   BMI 30.96 kg/m    General: Awake and alert, not in any acute distress, cooperative     Neuro:  Mental status: Awake, alert, attentive, oriented x3. Speech is fluent, comprehension and repetition intact. No dysarthria.  Good historian.  No neglect.  Cranial nerves:  Pupils equal and reactive.  EOMI, visual fields full, face symmetric, facial sensation intact, tongue/uvula midline, hearing intact to conversation.  Motor: Tone normal. 5/5 strength in all 4 extremities.  Sensory: Intact to light touch, temp.      Labs/Studies:  CBC:     Recent Labs   Lab 04/26/19  0721 04/25/19  0718 04/24/19  1025   WBC 7.0 6.0 6.5   RBC 5.02 5.02 5.15   HGB 14.9 15.0 15.5   HCT 43.6 43.5 44.9    136* 150     Basic Metabolic Panel:   Recent Labs   Lab Test 04/26/19  0721 04/25/19  0718 04/24/19  1025 04/24/19  0737    141  --  140   POTASSIUM 3.8 3.9  --  3.7   CHLORIDE 108 109  --  107   CO2 25 24  --  28   BUN 14 11  --  9   CR 0.91 0.90 0.91 0.94   * 114*  --  110*   ALAN 8.9 8.9  --  9.0     Liver panel:  Recent Labs   Lab Test 04/22/19  1050   PROTTOTAL 8.5   ALBUMIN 4.1   BILITOTAL 0.4   ALKPHOS 101   AST 25   ALT 43     INR:  Recent Labs   Lab Test 04/24/19  1025 04/22/19  1050 04/30/13  1332   INR 1.04 1.17* 1.01      Lipid Profile:  Recent Labs   Lab Test 04/24/19  1025   CHOL 116   HDL 49   LDL 50   TRIG 84     A1C:   Recent Labs   Lab Test 04/24/19  1025   A1C 6.3*     Troponin I:   Recent Labs   Lab Test 03/11/13  0350 03/10/13  2140 03/10/13  1615 03/10/13  1147   TROPI " <0.012 <0.012 <0.012 <0.012         Imaging:  Relevant findings as per the Impression above.

## 2019-04-27 NOTE — PROVIDER NOTIFICATION
"MD Notification    Notified Person: MD    Notified Person Name: Dr. Vi Armando    Notification Date/Time: 4/27/19 1620    Notification Interaction: paged    Purpose of Notification: \"Pt requesting laxative last BM was Wednesday. Already had 1 senna today. Thanks\"    Orders Received:    Comments:      "

## 2019-04-27 NOTE — PROGRESS NOTES
No changes overnight. Nicardipine gtt off, goal of SBP <120. Prn hydralazine given x1 with improvements. C/o of chronic back pain, relief with tylenol x2. Rt femoral angioseal c/d/i. Will cont to closely monitor.

## 2019-04-27 NOTE — PROVIDER NOTIFICATION
Spoke with dr Knutson on the phone, neuro check every 4 hours, SBP goal changed to <160. Ok to transfer to floor per neurocrit

## 2019-04-27 NOTE — PROGRESS NOTES
Vascular Neurology Progress Note    ____________________________________________________________    Stroke-Neurocritical Care Note  Admission Summary  Apolinar Mcgregor is an 58 year old male with HTN, GERD, and urinary retention admitted on 4/22/2019 for aphasia with AMS secondary to left occipital parietal ICH.  CTA/CTV unrevealing.  MRI Brain unrevealing.  On admission patient was agitated with right lateral tongue bite most likely secondary to seizure.  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     Last 24 hours:  Embolization s/p obliteration of dAVF today yesterday.  2 prn IV medications for SBP > 120.  Otherwise doing well.     Impression:   1. 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 Seizure     Recommendations:  1. SBP Cap 160, with q4 vitals  --long-term goal < 130/85  2. Keppra 500mg BID until Neuro follow-up for seizure associated with ICH  3. Q4 neurochecks  4. PT/OT/Speech  5. SCDs, DVT chemoprophylaxis     Uche Tavarez MD, MS  Neurology     Please contact the stroke-neurocritical care service with any questions: link to Text page     ____________________________________________________________________        Medications:      Current Facility-Administered Medications:      0.9% sodium chloride BOLUS, 250 mL, Intravenous, Once PRN, Vi Armando MD     acetaminophen (TYLENOL) tablet 650 mg, 650 mg, Oral, Q4H PRN, 650 mg at 04/27/19 1403 **OR** acetaminophen (TYLENOL) Suppository 650 mg, 650 mg, Rectal, Q4H PRN, Vi Armando MD     amLODIPine (NORVASC) tablet 5 mg, 5 mg, Oral, Daily, iV Armando MD, 5 mg at 04/27/19 0809     bisacodyl (DULCOLAX) Suppository 10 mg, 10 mg, Rectal, Daily PRN, Vi Armando MD, 10 mg at 04/25/19 1446     calcium carbonate (TUMS) chewable tablet 500 mg, 500 mg, Oral, Daily PRN, Vi Armando MD, 500 mg at 04/27/19 0542     cyclobenzaprine (FLEXERIL) tablet 10 mg, 10 mg, Oral, TID, Vi Armando MD,  10 mg at 04/27/19 0809     glucose gel 15-30 g, 15-30 g, Oral, Q15 Min PRN **OR** dextrose 50 % injection 25-50 mL, 25-50 mL, Intravenous, Q15 Min PRN **OR** glucagon injection 1 mg, 1 mg, Subcutaneous, Q15 Min PRN, Vi Armando MD     docusate sodium (COLACE) capsule 100 mg, 100 mg, Oral, BID PRN, Vi Armando MD     hydrALAZINE (APRESOLINE) injection 10-20 mg, 10-20 mg, Intravenous, Q4H PRN, Vi Armando MD, 10 mg at 04/27/19 0311     HYDROmorphone (PF) (DILAUDID) injection 0.3-0.5 mg, 0.3-0.5 mg, Intravenous, Q2H PRN, Vi Armando MD, 0.2 mg at 04/26/19 1937     hypromellose-dextran (ARTIFICAL TEARS) 0.1-0.3 % ophthalmic solution 2 drop, 2 drop, Ophthalmic, Q1H PRN, Vi Armando MD     labetalol (NORMODYNE/TRANDATE) syringe 10-20 mg, 10-20 mg, Intravenous, Q10 Min PRN, Vi Armando MD, 10 mg at 04/27/19 0736     levETIRAcetam (KEPPRA) intermittent infusion 500 mg, 500 mg, Intravenous, Q12H, Vi Armando MD, 500 mg at 04/27/19 1001     Lidocaine (LIDOCARE) 4 % Patch 1 patch, 1 patch, Transdermal, Q24h, 1 patch at 04/26/19 2003 **AND** lidocaine patch REMOVAL, , Transdermal, Q24H **AND** lidocaine patch in PLACE, , Transdermal, Q8H, Vi Armando MD     Medication Instructions: No D5W IV solutions unless patient hypoglycemic., , Does not apply, Continuous PRN, Vi Armando MD     metoclopramide (REGLAN) tablet 10 mg, 10 mg, Oral, Q6H PRN **OR** metoclopramide (REGLAN) injection 10 mg, 10 mg, Intravenous, Q6H PRN, Vi Armando MD, 10 mg at 04/26/19 2130     naloxone (NARCAN) injection 0.1-0.4 mg, 0.1-0.4 mg, Intravenous, Q2 Min PRN, Vi Armando MD     OLANZapine (zyPREXA) injection 10 mg, 10 mg, Intramuscular, Daily PRN, Vi Armando MD, 10 mg at 04/22/19 1055     ondansetron (ZOFRAN-ODT) ODT tab 4 mg, 4 mg, Oral, Q6H PRN **OR** ondansetron (ZOFRAN) injection 4  "mg, 4 mg, Intravenous, Q6H PRN, Vi Armando MD     polyethylene glycol (MIRALAX/GLYCOLAX) Packet 17 g, 17 g, Oral, BID PRN, Vi Armando MD     prochlorperazine (COMPAZINE) injection 10 mg, 10 mg, Intravenous, Q6H PRN **OR** prochlorperazine (COMPAZINE) tablet 10 mg, 10 mg, Oral, Q6H PRN **OR** prochlorperazine (COMPAZINE) Suppository 25 mg, 25 mg, Rectal, Q12H PRN, Vi Armando MD     senna-docusate (SENOKOT-S/PERICOLACE) 8.6-50 MG per tablet 1-2 tablet, 1-2 tablet, Oral, BID PRN, Vi Armando MD, 2 tablet at 04/27/19 1001      Vital Signs:  B/P: 121/81, T: 98, P: 82, R: 16    /81 (BP Location: Left arm)   Pulse 82   Temp 98  F (36.7  C) (Oral)   Resp 16   Ht 1.778 m (5' 10\")   Wt 97.6 kg (215 lb 2.7 oz)   SpO2 95%   BMI 30.87 kg/m    General: Awake and alert, not in any acute distress, cooperative  HEENT: Atraumatic, normocephalic, no scleral icterus or conjunctival pallor   Cardiac: RRR  Chest: Clear to auscultation  Abdomen: Soft, non-tender, non-distended  Extremities: No LE swelling.  Skin: No rash or lesion.   Psych: Normal mood and affect     Neuro:  Mental status: Awake, alert, attentive, oriented x3. Speech is fluent, comprehension and repetition intact. Hesitation and difficulty with higher level conversation.  No dysarthria.  No neglect.  Cranial nerves:  Pupils equal and reactive.  EOMI, visual fields full, face symmetric, facial sensation intact, shoulder shrug strong, palate rise symmetric, tongue/uvula midline, hearing intact to conversation.  Motor: Tone normal. 5/5 strength in all 4 extremities.  Sensory: Intact to light touch, temp.    Coordination: Finger nose finger intact bilaterally, no dysmetria, normal heel-shin test bilaterally  Gait: deferred    Labs/Studies:  CBC:     Recent Labs   Lab 04/27/19  0452 04/26/19  0721 04/25/19  0718   WBC 9.3 7.0 6.0   RBC 4.47 5.02 5.02   HGB 13.6 14.9 15.0   HCT 39.0* 43.6 43.5    158 " 136*     Basic Metabolic Panel:   Recent Labs   Lab Test 04/27/19  0452 04/26/19  0721 04/25/19  0718    139 141   POTASSIUM 4.2 3.8 3.9   CHLORIDE 112* 108 109   CO2 24 25 24   BUN 12 14 11   CR 0.69 0.91 0.90   * 127* 114*   ALAN 8.4* 8.9 8.9     Liver panel:  Recent Labs   Lab Test 04/22/19  1050   PROTTOTAL 8.5   ALBUMIN 4.1   BILITOTAL 0.4   ALKPHOS 101   AST 25   ALT 43     INR:  Recent Labs   Lab Test 04/24/19  1025 04/22/19  1050 04/30/13  1332   INR 1.04 1.17* 1.01      Lipid Profile:  Recent Labs   Lab Test 04/24/19  1025   CHOL 116   HDL 49   LDL 50   TRIG 84     A1C:   Recent Labs   Lab Test 04/24/19  1025   A1C 6.3*     Troponin I:   Recent Labs   Lab Test 03/11/13  0350 03/10/13  2140 03/10/13  1615 03/10/13  1147   TROPI <0.012 <0.012 <0.012 <0.012         Imaging:  Relevant findings as per the Impression above.

## 2019-04-28 ENCOUNTER — APPOINTMENT (OUTPATIENT)
Dept: PHYSICAL THERAPY | Facility: CLINIC | Age: 59
DRG: 020 | End: 2019-04-28
Payer: COMMERCIAL

## 2019-04-28 ENCOUNTER — APPOINTMENT (OUTPATIENT)
Dept: OCCUPATIONAL THERAPY | Facility: CLINIC | Age: 59
DRG: 020 | End: 2019-04-28
Payer: COMMERCIAL

## 2019-04-28 LAB
ANION GAP SERPL CALCULATED.3IONS-SCNC: 7 MMOL/L (ref 3–14)
BUN SERPL-MCNC: 12 MG/DL (ref 7–30)
CALCIUM SERPL-MCNC: 8.7 MG/DL (ref 8.5–10.1)
CHLORIDE SERPL-SCNC: 109 MMOL/L (ref 94–109)
CO2 SERPL-SCNC: 24 MMOL/L (ref 20–32)
CREAT SERPL-MCNC: 0.83 MG/DL (ref 0.66–1.25)
ERYTHROCYTE [DISTWIDTH] IN BLOOD BY AUTOMATED COUNT: 14.4 % (ref 10–15)
GFR SERPL CREATININE-BSD FRML MDRD: >90 ML/MIN/{1.73_M2}
GLUCOSE BLDC GLUCOMTR-MCNC: 100 MG/DL (ref 70–99)
GLUCOSE BLDC GLUCOMTR-MCNC: 109 MG/DL (ref 70–99)
GLUCOSE BLDC GLUCOMTR-MCNC: 113 MG/DL (ref 70–99)
GLUCOSE BLDC GLUCOMTR-MCNC: 127 MG/DL (ref 70–99)
GLUCOSE BLDC GLUCOMTR-MCNC: 159 MG/DL (ref 70–99)
GLUCOSE SERPL-MCNC: 108 MG/DL (ref 70–99)
HCT VFR BLD AUTO: 39.7 % (ref 40–53)
HGB BLD-MCNC: 13.5 G/DL (ref 13.3–17.7)
MCH RBC QN AUTO: 29.8 PG (ref 26.5–33)
MCHC RBC AUTO-ENTMCNC: 34 G/DL (ref 31.5–36.5)
MCV RBC AUTO: 88 FL (ref 78–100)
PLATELET # BLD AUTO: 166 10E9/L (ref 150–450)
POTASSIUM SERPL-SCNC: 3.8 MMOL/L (ref 3.4–5.3)
RBC # BLD AUTO: 4.53 10E12/L (ref 4.4–5.9)
SODIUM SERPL-SCNC: 140 MMOL/L (ref 133–144)
WBC # BLD AUTO: 8.4 10E9/L (ref 4–11)

## 2019-04-28 PROCEDURE — 25000132 ZZH RX MED GY IP 250 OP 250 PS 637: Performed by: INTERNAL MEDICINE

## 2019-04-28 PROCEDURE — 97535 SELF CARE MNGMENT TRAINING: CPT | Mod: GO | Performed by: OCCUPATIONAL THERAPIST

## 2019-04-28 PROCEDURE — 97750 PHYSICAL PERFORMANCE TEST: CPT | Mod: GP

## 2019-04-28 PROCEDURE — 99233 SBSQ HOSP IP/OBS HIGH 50: CPT | Performed by: INTERNAL MEDICINE

## 2019-04-28 PROCEDURE — 25000128 H RX IP 250 OP 636: Performed by: PSYCHIATRY & NEUROLOGY

## 2019-04-28 PROCEDURE — 00000146 ZZHCL STATISTIC GLUCOSE BY METER IP

## 2019-04-28 PROCEDURE — 12000000 ZZH R&B MED SURG/OB

## 2019-04-28 PROCEDURE — 97116 GAIT TRAINING THERAPY: CPT | Mod: GP

## 2019-04-28 PROCEDURE — 85027 COMPLETE CBC AUTOMATED: CPT | Performed by: INTERNAL MEDICINE

## 2019-04-28 PROCEDURE — 25000125 ZZHC RX 250: Performed by: INTERNAL MEDICINE

## 2019-04-28 PROCEDURE — 99232 SBSQ HOSP IP/OBS MODERATE 35: CPT | Performed by: PSYCHIATRY & NEUROLOGY

## 2019-04-28 PROCEDURE — 25000128 H RX IP 250 OP 636: Performed by: INTERNAL MEDICINE

## 2019-04-28 PROCEDURE — 36415 COLL VENOUS BLD VENIPUNCTURE: CPT | Performed by: INTERNAL MEDICINE

## 2019-04-28 PROCEDURE — 80048 BASIC METABOLIC PNL TOTAL CA: CPT | Performed by: INTERNAL MEDICINE

## 2019-04-28 RX ORDER — TAMSULOSIN HYDROCHLORIDE 0.4 MG/1
0.4 CAPSULE ORAL DAILY
Status: DISCONTINUED | OUTPATIENT
Start: 2019-04-28 | End: 2019-04-29 | Stop reason: HOSPADM

## 2019-04-28 RX ORDER — FLUORIDE TOOTHPASTE
10 TOOTHPASTE DENTAL 4 TIMES DAILY
Status: DISCONTINUED | OUTPATIENT
Start: 2019-04-28 | End: 2019-04-29 | Stop reason: HOSPADM

## 2019-04-28 RX ORDER — LIDOCAINE HYDROCHLORIDE 20 MG/ML
JELLY TOPICAL ONCE
Status: COMPLETED | OUTPATIENT
Start: 2019-04-28 | End: 2019-04-28

## 2019-04-28 RX ADMIN — SENNOSIDES AND DOCUSATE SODIUM 2 TABLET: 8.6; 5 TABLET ORAL at 09:20

## 2019-04-28 RX ADMIN — LIDOCAINE HYDROCHLORIDE: 20 JELLY TOPICAL at 02:20

## 2019-04-28 RX ADMIN — HEPARIN SODIUM 5000 UNITS: 5000 INJECTION, SOLUTION INTRAVENOUS; SUBCUTANEOUS at 09:01

## 2019-04-28 RX ADMIN — ACETAMINOPHEN 650 MG: 325 TABLET, FILM COATED ORAL at 04:46

## 2019-04-28 RX ADMIN — CALCIUM CARBONATE (ANTACID) CHEW TAB 500 MG 500 MG: 500 CHEW TAB at 21:31

## 2019-04-28 RX ADMIN — TAMSULOSIN HYDROCHLORIDE 0.4 MG: 0.4 CAPSULE ORAL at 18:06

## 2019-04-28 RX ADMIN — LEVETIRACETAM 500 MG: 5 INJECTION INTRAVENOUS at 10:29

## 2019-04-28 RX ADMIN — AMLODIPINE BESYLATE 5 MG: 5 TABLET ORAL at 09:01

## 2019-04-28 RX ADMIN — POLYETHYLENE GLYCOL 3350 17 G: 17 POWDER, FOR SOLUTION ORAL at 09:20

## 2019-04-28 RX ADMIN — BISACODYL 10 MG: 10 SUPPOSITORY RECTAL at 02:30

## 2019-04-28 RX ADMIN — CYCLOBENZAPRINE HYDROCHLORIDE 10 MG: 10 TABLET, FILM COATED ORAL at 21:29

## 2019-04-28 RX ADMIN — LEVETIRACETAM 500 MG: 5 INJECTION INTRAVENOUS at 22:55

## 2019-04-28 RX ADMIN — CYCLOBENZAPRINE HYDROCHLORIDE 10 MG: 10 TABLET, FILM COATED ORAL at 15:22

## 2019-04-28 RX ADMIN — HEPARIN SODIUM 5000 UNITS: 5000 INJECTION, SOLUTION INTRAVENOUS; SUBCUTANEOUS at 19:50

## 2019-04-28 RX ADMIN — ACETAMINOPHEN 650 MG: 325 TABLET, FILM COATED ORAL at 19:50

## 2019-04-28 RX ADMIN — LIDOCAINE 1 PATCH: 560 PATCH PERCUTANEOUS; TOPICAL; TRANSDERMAL at 19:50

## 2019-04-28 RX ADMIN — CYCLOBENZAPRINE HYDROCHLORIDE 10 MG: 10 TABLET, FILM COATED ORAL at 09:01

## 2019-04-28 ASSESSMENT — MIFFLIN-ST. JEOR: SCORE: 1778.35

## 2019-04-28 ASSESSMENT — ACTIVITIES OF DAILY LIVING (ADL)
ADLS_ACUITY_SCORE: 15
ADLS_ACUITY_SCORE: 14
ADLS_ACUITY_SCORE: 15
ADLS_ACUITY_SCORE: 15
ADLS_ACUITY_SCORE: 14
ADLS_ACUITY_SCORE: 14

## 2019-04-28 NOTE — PROGRESS NOTES
04/28/19 1000   Signing Clinician's Name / Credentials   Signing clinician's name / credentials STEVEN LujanT   Pradhan Balance Scale (JUNIOR REYES, DOMENICA S, LEANNE GASPAR, DANAE CANTU: MEASURING BALANCE IN THE ELDERLY: VALIDATION OF AN INSTRUMENT. CAN. J. PUB. HEALTH, JULY/AUGUST SUPPLEMENT 2:S7-11, 1992.)   Sit To Stand 3   Standing Unsupported 3   Sitting Unsupported 4   Stand to Sit 3   Transfers 3   Standing with Eyes Closed 3   Standing Unsupported, Feet Together 2   Reach Forward With Outstretched Arm 3   Retrieve Object From Floor 3   Turning to Look Behind 3   Turn 360 Degrees 2   Placing Alternate Foot on Stool (4-6 inches) 2   Unsupported Tandem Stand (Demonstrate to Subject) 2   One Leg Stand 3   Total Score (A score of 45 or less has been correlated with an increased risk of falls)   Total Score (out of 56) 39

## 2019-04-28 NOTE — PROVIDER NOTIFICATION
"Brief update:    Paged by nursing with concern for thrush    Patient complains of \"cotton mouth\"    Noted to have a white coated tongue.  Patient is refusing oral cares, noted to have halitosis.    Patient appears to be immunocompetent, not on inhaled steroids.    As patient does not have pain or discomfort, thrush seems less likely as compared to coated tongue related to poor oral hygiene.    Continue to encourage oral hygiene  Biotene swish and spit 10 mL 4 times daily    Will defer oral exam to rounding provider.    Bandra Story MD  12:17 AM    "

## 2019-04-28 NOTE — PLAN OF CARE
"VSS, A/O x 4, neuro unchanged, tele NSR, BG WDL. Agitated/angry and Impulsive at times as it related to his back pain and bowels. Yelling out that \"I want a new mattress because this one hurts my back\" (chronic back pain) and \"I have to poop!\". In addition, frequent urination and retention resulted in eventually leaving in a tracy. Schedule Flexeril and prn tylenol, lidocaine patch helped with back pain. Bisacodyl given without effect. Halitosis and tongue is white/cheesy coated with complaints of \"very dry mouth.\" Declined oral care. MD notified and Biotene ordered. Up with walker, gait belt. Slight unsteady.  "

## 2019-04-28 NOTE — PLAN OF CARE
Discharge Planner OT   Patient plan for discharge: Not stated  Current status:Pt.c/o L ear pain(behind the ear) --MD aware + back pain, rated at 9/10, believes it is from the bed. Pt. benton to come supine-sit w/ SBA., increased time, follows commands, needs increased time for processing;Pt. completed sit-stand w/ CGA-min. A/vc/WW;Pt. ambulated to bathroom w/ overall CGA + vc's for WW safety;Pt. tolerated standing at sink for approx. 2 mins. to initiate teeth-brushing, overall CGA for standing balance, then needed to sit due to fatigue/back pain;completed grooming tasks(finished rinsing mouth + washed face), w/ set-up/vc's for sequencing/task completion at times;Pt. completed sit-stand from BSC w/ overeall min. A;ambulated to chair w/ CGA, sit-stand w/ min. A/vc's to reach back for armrest;Pt. oriented X 4, except for month, initially stated as November, able to correct to April.   Barriers to return to prior living situation: Current level of assist, impaired cognition/safety, pain, falls risk  Recommendations for discharge: ARU  Rationale for recommendations: daily intensive therapy to increase ADL and functional mobility indep. and safety to PLOF;pt will be able to tolerate 3 hours of therapy at discharge.          Entered by: Lelo Schmitt 04/28/2019 3:11 PM

## 2019-04-28 NOTE — PLAN OF CARE
Pt is A/O x4. SBA. IV SL. VSS. C/O low back pain, scheduled flexeril given. Neuros intact. Hinson patent. BS checks, mod-carb diet. Tele sinus tachy. Pt has C/O constipation. PRN Miralax and Senna given with no results. Cont PRN meds and suppositories. Plans to discharge to ARU, potentially tomorrow.

## 2019-04-28 NOTE — PLAN OF CARE
A/Ox 4. Neuros intact. Lung sounds clear. VSS. Assist with Ax1 GB. MCHO diet, with thin liquids. BS normoactive, minimal flatus, -BM since Wednesday--miralax given. Voiding, borderline PVRs last PVR at 1900 365mL. C/o minimal back pain with movement, decreased with rest & scheduled Flexeril. Therapies recommending ARU. Discharge pending.

## 2019-04-28 NOTE — PLAN OF CARE
SLP cognitive linguistic evaluation attempted, however pt having multiple visitors, requesting to complete at a different time.

## 2019-04-28 NOTE — PROVIDER NOTIFICATION
Paged by nursing with request to order sliding scale as patient on metformin at home and has had regular BG checks in ICU. A1C is 6.3. Will continue BG checks for now, BG have been controlled without insulin so will monitor for now and add this tomorrow if needed.     Obdulia Murphy PA-C

## 2019-04-28 NOTE — PLAN OF CARE
Discharge Planner PT   Patient plan for discharge: None stated  Current status: Pradhan performed: 39/56 indicating fall risk. SBA for transfers and bed mobility. Pt ambulates 300' CGA without AD, WBOS decreased pace, B toe out with flat foot gait pattern. Inconsistent back pain complaints with mobility (from no pain to increased pain). Pt generally steady, but pace is significantly decreased, one minor LOB recovered IND, increased difficulty with turns.  Barriers to return to prior living situation: Decreased balance and coordination inconsistency, impaired activity tolerance and strength from baseline.  Recommendations for discharge: Acute Rehab  Rationale for recommendations: Pt will benefit from continued skilled rehab services to progress independence and safety with functional mobility. Pt will be able to tolerate 3 hrs of therapy per day.       Entered by: Jaleel Peña 04/28/2019 12:45 PM

## 2019-04-28 NOTE — PROGRESS NOTES
[facility]  Hospitalist Progress Note    Assessment & Plan   Apolinar Mcgregor is a 58 year old male with a history of HTN who presented to the ED with altered mental status and was admitted 4/22/19 with acute parenchymal hemorrhage.     Acute parenchymal hemorrhage  Left parietal and occipital spontaneous intraparenchymal hemorrhage due to dural AVF  Cerebral edema secondary to above  AMS due to above.  Resolved   Patient initially presented with AMS.  Found to have an acute parenchymal hemorrhage in the left parietal and occipital lobs measuring up to 4.7 cm with 4 mm rightward midline shift.  Wife does not know of any traumatic head injuries.  Does have a history of HTN but blood pressure well controlled on presentation.  UDS negative.  ED did speak with neurosurgery who recommended admission to ICU and continued monitoring.  Repeat head CT showed stable hemorrhage.  MRI of the brain on 4/23 showed hemorrhage and mass-effect were stable and without any obvious masses or abnormalities.  Angiogram showed a left occipital dural aVF.  Repeat CT head showed stable hemorrhage   MRI brain on 4/23 showed the hemorrhage and mass effect were stable and without any obvious masses or abnormalities  - Goal SBP <160 with PRN Labetalol and Hydralazine  - PT/OT/Speech consulted.  Likely TCU versus ARU at discharge.  - Neurosurgery and neurology consulted.  Appreciate their assistance.  No plans for surgical intervention at this time. Underwent cerebral angiogram.    -s/p dAVF embolization on 4/26 and admitted to ICU  -q4 hour neuro checks  -Keppra per neurology     Anion gap metabolic acidosis, resolved   Given the bleed it is possible the patient had a seizure and this could have elevated his lactate.      Hypertension   Blood pressures well controlled at this time without intervention  - Restarted PTA Norvasc     Urinary Retention  Suspect BPH  Developed overnight 4/27=>28 resulting in multiple straight caths and  "then tracy placement. Patient reports nocturia, 3-4 times a night for many years.   -continue tracy for now. Plan to discontinue prior to ARU transfer  -start flomax    Chronic Back Pain  -acetaminophen as needed  -flexeril TID  -lidoderm patch    Dry Mouth  Poor Dentition  No evidence for thrush. Suspect dry mouth from hospitalization and whitish tongue build-up due to poor oral hygiene. No white patches on buccal mucosa, hard palate nor posterior pharynx.  -optimize oral hygiene  -biotene prn    Type 2 DM  On metformin PTA, not continued during hospitalization.  -continue corrective dose aspart as needed  -resume metformin at discharge     GERD  -resume PTA PPI    Orders Placed This Encounter      Moderate Consistent CHO Diet    DVT prophylaxis: Pneumatic Compression Devices and Ambulate every shift  Code Status: Full Code    Disposition: Expected discharge to ARU when bed available.     Vi Armando MD  [phone] (7am - 6pm)  Text Page  ~~~~~~~~~~~~~~~~~~~~~~~~~~~~~~~~~~~~~~~~~~~~~~~  Interval History   Complains of not liking the bed due to back pain and increase of chronic low back pain in general. Irritated about having tracy placed last night. Reports having to get up multiple times a night to urinate for \"years\" and does not see anything wrong with this. Afebrile. Eating and drinking ok.     -Data reviewed today: I reviewed all new labs and imaging results over the last 24 hours.     Physical Exam   Temp: 98.1  F (36.7  C) Temp src: Oral BP: 132/85 Pulse: 82 Heart Rate: 96 Resp: 16 SpO2: 95 % O2 Device: None (Room air)    Vitals:    04/24/19 0530 04/27/19 0500 04/28/19 0150   Weight: 97.9 kg (215 lb 12.8 oz) 97.6 kg (215 lb 2.7 oz) 95.2 kg (209 lb 14.4 oz)     Vital Signs with Ranges  Temp:  [97.9  F (36.6  C)-98.9  F (37.2  C)] 98.1  F (36.7  C)  Pulse:  [82-87] 82  Heart Rate:  [81-96] 96  Resp:  [16-25] 16  BP: (109-155)/() 132/85  SpO2:  [95 %-98 %] 95 %  I/O last 3 completed shifts:  In: " 2628.33 [P.O.:1550; I.V.:1078.33]  Out: 3875 [Urine:3875]    Constitutional: Alert, NAD, sitting up in chair  HEENT: mouth dry, whitish gunk on tongue, no white patches on buccal mucosa, hard or soft palate, posterior pharynx  Respiratory: Lungs CTAB, no wheezes or crackles  Cardiovascular: RRR, no murmurs  no LE edema  : tracy in place  Skin/Integument: warm, dry, no acute rashes  Musc: TOMLINSON, normal limb strength x 4  Neuro: AOx3, no tremors or focal deficits, speech fluent  Psych: irritable, not anxious, no confusion      Medications     - MEDICATION INSTRUCTIONS -         amLODIPine  5 mg Oral Daily     artificial saliva  10 mL Swish & Spit 4x Daily     cyclobenzaprine  10 mg Oral TID     heparin  5,000 Units Subcutaneous Q12H     levETIRAcetam  500 mg Intravenous Q12H     lidocaine  1 patch Transdermal Q24h    And     lidocaine   Transdermal Q24H    And     lidocaine   Transdermal Q8H       Data   Recent Labs   Lab 04/28/19  0834 04/27/19  0452 04/26/19  0721  04/24/19  1025  04/22/19  1050   WBC 8.4 9.3 7.0   < > 6.5   < > 14.1*   HGB 13.5 13.6 14.9   < > 15.5   < > 16.8   MCV 88 87 87   < > 87   < > 91    173 158   < > 150   < > 191   INR  --   --   --   --  1.04  --  1.17*    143 139   < >  --    < > 144   POTASSIUM 3.8 4.2 3.8   < >  --    < > 3.8   CHLORIDE 109 112* 108   < >  --    < > 106   CO2 24 24 25   < >  --    < > 14*   BUN 12 12 14   < >  --    < > 20   CR 0.83 0.69 0.91   < > 0.91   < > 1.10   ANIONGAP 7 7 6   < >  --    < > 24*   ALAN 8.7 8.4* 8.9   < >  --    < > 9.5   * 136* 127*   < >  --    < > 178*   ALBUMIN  --   --   --   --   --   --  4.1   PROTTOTAL  --   --   --   --   --   --  8.5   BILITOTAL  --   --   --   --   --   --  0.4   ALKPHOS  --   --   --   --   --   --  101   ALT  --   --   --   --   --   --  43   AST  --   --   --   --   --   --  25    < > = values in this interval not displayed.       Imaging:  No results found for this or any previous visit (from the  past 24 hour(s)).

## 2019-04-29 ENCOUNTER — APPOINTMENT (OUTPATIENT)
Dept: PHYSICAL THERAPY | Facility: CLINIC | Age: 59
DRG: 020 | End: 2019-04-29
Payer: COMMERCIAL

## 2019-04-29 ENCOUNTER — HOSPITAL ENCOUNTER (INPATIENT)
Facility: CLINIC | Age: 59
LOS: 6 days | Discharge: HOME | DRG: 057 | End: 2019-05-05
Attending: PHYSICAL MEDICINE & REHABILITATION | Admitting: PHYSICAL MEDICINE & REHABILITATION
Payer: COMMERCIAL

## 2019-04-29 VITALS
BODY MASS INDEX: 30.05 KG/M2 | OXYGEN SATURATION: 98 % | WEIGHT: 209.9 LBS | DIASTOLIC BLOOD PRESSURE: 85 MMHG | TEMPERATURE: 98.3 F | RESPIRATION RATE: 16 BRPM | SYSTOLIC BLOOD PRESSURE: 126 MMHG | HEIGHT: 70 IN | HEART RATE: 82 BPM

## 2019-04-29 DIAGNOSIS — K59.01 SLOW TRANSIT CONSTIPATION: ICD-10-CM

## 2019-04-29 DIAGNOSIS — G40.909 SEIZURE DISORDER (H): ICD-10-CM

## 2019-04-29 DIAGNOSIS — R33.9 URINARY RETENTION: ICD-10-CM

## 2019-04-29 DIAGNOSIS — I10 BENIGN ESSENTIAL HYPERTENSION: ICD-10-CM

## 2019-04-29 DIAGNOSIS — R68.2 DRY MOUTH: ICD-10-CM

## 2019-04-29 DIAGNOSIS — I61.9 INTRAPARENCHYMAL HEMORRHAGE OF BRAIN (H): ICD-10-CM

## 2019-04-29 DIAGNOSIS — G31.84 MILD COGNITIVE IMPAIRMENT: Primary | ICD-10-CM

## 2019-04-29 DIAGNOSIS — R52 PAIN: ICD-10-CM

## 2019-04-29 LAB
ANION GAP SERPL CALCULATED.3IONS-SCNC: 7 MMOL/L (ref 3–14)
BUN SERPL-MCNC: 14 MG/DL (ref 7–30)
CALCIUM SERPL-MCNC: 9 MG/DL (ref 8.5–10.1)
CHLORIDE SERPL-SCNC: 107 MMOL/L (ref 94–109)
CO2 SERPL-SCNC: 25 MMOL/L (ref 20–32)
CREAT SERPL-MCNC: 0.82 MG/DL (ref 0.66–1.25)
ERYTHROCYTE [DISTWIDTH] IN BLOOD BY AUTOMATED COUNT: 14.1 % (ref 10–15)
GFR SERPL CREATININE-BSD FRML MDRD: >90 ML/MIN/{1.73_M2}
GLUCOSE BLDC GLUCOMTR-MCNC: 101 MG/DL (ref 70–99)
GLUCOSE BLDC GLUCOMTR-MCNC: 104 MG/DL (ref 70–99)
GLUCOSE BLDC GLUCOMTR-MCNC: 108 MG/DL (ref 70–99)
GLUCOSE BLDC GLUCOMTR-MCNC: 129 MG/DL (ref 70–99)
GLUCOSE SERPL-MCNC: 104 MG/DL (ref 70–99)
HCT VFR BLD AUTO: 39.5 % (ref 40–53)
HGB BLD-MCNC: 13.5 G/DL (ref 13.3–17.7)
MCH RBC QN AUTO: 29.6 PG (ref 26.5–33)
MCHC RBC AUTO-ENTMCNC: 34.2 G/DL (ref 31.5–36.5)
MCV RBC AUTO: 87 FL (ref 78–100)
PLATELET # BLD AUTO: 170 10E9/L (ref 150–450)
POTASSIUM SERPL-SCNC: 3.7 MMOL/L (ref 3.4–5.3)
RBC # BLD AUTO: 4.56 10E12/L (ref 4.4–5.9)
SODIUM SERPL-SCNC: 139 MMOL/L (ref 133–144)
WBC # BLD AUTO: 7.5 10E9/L (ref 4–11)

## 2019-04-29 PROCEDURE — 25000132 ZZH RX MED GY IP 250 OP 250 PS 637: Performed by: STUDENT IN AN ORGANIZED HEALTH CARE EDUCATION/TRAINING PROGRAM

## 2019-04-29 PROCEDURE — 25000132 ZZH RX MED GY IP 250 OP 250 PS 637: Performed by: INTERNAL MEDICINE

## 2019-04-29 PROCEDURE — 00000146 ZZHCL STATISTIC GLUCOSE BY METER IP

## 2019-04-29 PROCEDURE — 80048 BASIC METABOLIC PNL TOTAL CA: CPT | Performed by: INTERNAL MEDICINE

## 2019-04-29 PROCEDURE — 97530 THERAPEUTIC ACTIVITIES: CPT | Mod: GP

## 2019-04-29 PROCEDURE — 12800006 ZZH R&B REHAB

## 2019-04-29 PROCEDURE — 85027 COMPLETE CBC AUTOMATED: CPT | Performed by: INTERNAL MEDICINE

## 2019-04-29 PROCEDURE — 36415 COLL VENOUS BLD VENIPUNCTURE: CPT | Performed by: INTERNAL MEDICINE

## 2019-04-29 PROCEDURE — 97162 PT EVAL MOD COMPLEX 30 MIN: CPT | Mod: GP

## 2019-04-29 PROCEDURE — 97116 GAIT TRAINING THERAPY: CPT | Mod: GP

## 2019-04-29 PROCEDURE — 99239 HOSP IP/OBS DSCHRG MGMT >30: CPT | Performed by: INTERNAL MEDICINE

## 2019-04-29 PROCEDURE — 25000132 ZZH RX MED GY IP 250 OP 250 PS 637: Performed by: PHYSICAL MEDICINE & REHABILITATION

## 2019-04-29 PROCEDURE — 25000132 ZZH RX MED GY IP 250 OP 250 PS 637: Performed by: PHYSICIAN ASSISTANT

## 2019-04-29 RX ORDER — AMLODIPINE BESYLATE 5 MG/1
5 TABLET ORAL DAILY
Status: DISCONTINUED | OUTPATIENT
Start: 2019-04-30 | End: 2019-05-05 | Stop reason: HOSPADM

## 2019-04-29 RX ORDER — FLUORIDE TOOTHPASTE
10 TOOTHPASTE DENTAL 4 TIMES DAILY
Status: ON HOLD | DISCHARGE
Start: 2019-04-29 | End: 2019-05-04

## 2019-04-29 RX ORDER — LEVETIRACETAM 500 MG/1
500 TABLET ORAL 2 TIMES DAILY
Status: ON HOLD | DISCHARGE
Start: 2019-04-29 | End: 2019-05-04

## 2019-04-29 RX ORDER — CYCLOBENZAPRINE HCL 10 MG
10 TABLET ORAL 3 TIMES DAILY
Status: ON HOLD | DISCHARGE
Start: 2019-04-29 | End: 2019-05-04

## 2019-04-29 RX ORDER — CALCIUM CARBONATE 500 MG/1
500-1000 TABLET, CHEWABLE ORAL 3 TIMES DAILY PRN
Status: DISCONTINUED | OUTPATIENT
Start: 2019-04-29 | End: 2019-05-05 | Stop reason: HOSPADM

## 2019-04-29 RX ORDER — LIDOCAINE 4 G/G
1 PATCH TOPICAL EVERY 24 HOURS
Status: ON HOLD | DISCHARGE
Start: 2019-04-29 | End: 2019-05-04

## 2019-04-29 RX ORDER — FLUORIDE TOOTHPASTE
10 TOOTHPASTE DENTAL 4 TIMES DAILY
Status: DISCONTINUED | OUTPATIENT
Start: 2019-04-29 | End: 2019-05-05 | Stop reason: HOSPADM

## 2019-04-29 RX ORDER — DOCUSATE SODIUM 100 MG/1
100 CAPSULE, LIQUID FILLED ORAL 2 TIMES DAILY PRN
Status: DISCONTINUED | OUTPATIENT
Start: 2019-04-29 | End: 2019-05-05 | Stop reason: HOSPADM

## 2019-04-29 RX ORDER — LEVETIRACETAM 500 MG/1
500 TABLET, FILM COATED, EXTENDED RELEASE ORAL 2 TIMES DAILY
Status: DISCONTINUED | OUTPATIENT
Start: 2019-04-29 | End: 2019-04-29

## 2019-04-29 RX ORDER — CYCLOBENZAPRINE HCL 5 MG
10 TABLET ORAL 3 TIMES DAILY
Status: DISCONTINUED | OUTPATIENT
Start: 2019-04-29 | End: 2019-04-30

## 2019-04-29 RX ORDER — LEVETIRACETAM 500 MG/1
500 TABLET ORAL 2 TIMES DAILY
Status: DISCONTINUED | OUTPATIENT
Start: 2019-04-29 | End: 2019-04-29 | Stop reason: HOSPADM

## 2019-04-29 RX ORDER — TAMSULOSIN HYDROCHLORIDE 0.4 MG/1
0.4 CAPSULE ORAL DAILY
Status: DISCONTINUED | OUTPATIENT
Start: 2019-04-30 | End: 2019-05-05 | Stop reason: HOSPADM

## 2019-04-29 RX ORDER — LEVETIRACETAM 500 MG/1
500 TABLET ORAL 2 TIMES DAILY
Status: DISCONTINUED | OUTPATIENT
Start: 2019-04-29 | End: 2019-05-05 | Stop reason: HOSPADM

## 2019-04-29 RX ORDER — BISACODYL 10 MG
10 SUPPOSITORY, RECTAL RECTAL DAILY PRN
Status: DISCONTINUED | OUTPATIENT
Start: 2019-04-29 | End: 2019-05-05 | Stop reason: HOSPADM

## 2019-04-29 RX ORDER — TAMSULOSIN HYDROCHLORIDE 0.4 MG/1
0.4 CAPSULE ORAL DAILY
Status: ON HOLD | DISCHARGE
Start: 2019-04-30 | End: 2019-05-04

## 2019-04-29 RX ORDER — METFORMIN HCL 500 MG
500 TABLET, EXTENDED RELEASE 24 HR ORAL
Status: DISCONTINUED | OUTPATIENT
Start: 2019-04-29 | End: 2019-05-05 | Stop reason: HOSPADM

## 2019-04-29 RX ORDER — ACETAMINOPHEN 325 MG/1
650 TABLET ORAL EVERY 6 HOURS PRN
Status: DISCONTINUED | OUTPATIENT
Start: 2019-04-29 | End: 2019-05-05 | Stop reason: HOSPADM

## 2019-04-29 RX ORDER — DOCUSATE SODIUM 100 MG/1
100 CAPSULE, LIQUID FILLED ORAL 2 TIMES DAILY PRN
Status: ON HOLD | DISCHARGE
Start: 2019-04-29 | End: 2019-05-04

## 2019-04-29 RX ORDER — LIDOCAINE 4 G/G
1 PATCH TOPICAL
Status: DISCONTINUED | OUTPATIENT
Start: 2019-04-29 | End: 2019-05-05 | Stop reason: HOSPADM

## 2019-04-29 RX ADMIN — Medication 10 ML: at 16:49

## 2019-04-29 RX ADMIN — ACETAMINOPHEN 650 MG: 325 TABLET, FILM COATED ORAL at 09:40

## 2019-04-29 RX ADMIN — CYCLOBENZAPRINE HYDROCHLORIDE 10 MG: 5 TABLET, FILM COATED ORAL at 13:56

## 2019-04-29 RX ADMIN — CALCIUM CARBONATE (ANTACID) CHEW TAB 500 MG 1000 MG: 500 CHEW TAB at 13:57

## 2019-04-29 RX ADMIN — METFORMIN HYDROCHLORIDE 500 MG: 500 TABLET, EXTENDED RELEASE ORAL at 17:27

## 2019-04-29 RX ADMIN — ACETAMINOPHEN 650 MG: 325 TABLET, FILM COATED ORAL at 00:01

## 2019-04-29 RX ADMIN — SENNOSIDES AND DOCUSATE SODIUM 2 TABLET: 8.6; 5 TABLET ORAL at 10:23

## 2019-04-29 RX ADMIN — TAMSULOSIN HYDROCHLORIDE 0.4 MG: 0.4 CAPSULE ORAL at 08:33

## 2019-04-29 RX ADMIN — AMLODIPINE BESYLATE 5 MG: 5 TABLET ORAL at 08:33

## 2019-04-29 RX ADMIN — CYCLOBENZAPRINE HYDROCHLORIDE 10 MG: 10 TABLET, FILM COATED ORAL at 08:33

## 2019-04-29 RX ADMIN — Medication 10 ML: at 13:01

## 2019-04-29 RX ADMIN — OMEPRAZOLE 20 MG: 20 CAPSULE, DELAYED RELEASE ORAL at 08:33

## 2019-04-29 RX ADMIN — LEVETIRACETAM 500 MG: 500 TABLET, FILM COATED ORAL at 20:53

## 2019-04-29 RX ADMIN — LEVETIRACETAM 500 MG: 500 TABLET ORAL at 10:23

## 2019-04-29 RX ADMIN — Medication 10 ML: at 20:53

## 2019-04-29 RX ADMIN — ACETAMINOPHEN 650 MG: 325 TABLET, FILM COATED ORAL at 04:02

## 2019-04-29 RX ADMIN — DOCUSATE SODIUM 100 MG: 100 CAPSULE, LIQUID FILLED ORAL at 15:04

## 2019-04-29 RX ADMIN — ACETAMINOPHEN 650 MG: 325 TABLET, FILM COATED ORAL at 20:53

## 2019-04-29 ASSESSMENT — MIFFLIN-ST. JEOR: SCORE: 1796.95

## 2019-04-29 ASSESSMENT — ACTIVITIES OF DAILY LIVING (ADL)
ADLS_ACUITY_SCORE: 15

## 2019-04-29 NOTE — PLAN OF CARE
A&O x4 but forgetful. Neuros intact. VSS on RA. Consistent carb diet, thin liquid. Takes pills whole. Tele NSR. Up with A1/radha/walker. Sravan patent (will go with to ARU). C/o pain to hip and back related to hospital beds, decreased slightly with heat packs, tylenol and flexeril. Patient discharged to ARU via [facility]. Report called to ARU nurse.

## 2019-04-29 NOTE — PROGRESS NOTES
Analy Progress Note  Chart Reviewed, Pt discussed in Interdisciplinary Rounds.   Pt is clinically ready for discharge to  ARU today.  Per rounding, [facility] liaison indicates that they have a bed for pt today and request a HE w/c ride for 12:30pm..     Intervention:   ANALY contacted HE and spoke with Max. The rides available at this point is a 10:30am or a 4:00pm ride.   SW set up ride for 10:30am.  ARU liaison accepts this time.  ANALY spoke with pt and wife with updates of emergent ride and cost of ride. Pt wife given  ARU map and contact    information. Pt's wife plans to collect pt's clothing from home and meet pt at the rehab facility.  Discharge orders and summary in Epic.    Team Members notified:   CTRN, RN,HUC,BB, hospitalist    Plan: Discharge to [facility]U today at 10:30am via HE w/c    SHANNA Granado  [facility]  Phone: [phone]

## 2019-04-29 NOTE — PLAN OF CARE
FOCUS/GOAL  Bowel management, Bladder management, Pain management, Wound care management and Mobility    ASSESSMENT, INTERVENTIONS AND CONTINUING PLAN FOR GOAL:  Patient arrived safely via transport from [facility] around 1100. Patient is A&O x4, makes needs known. Wife is at bedside. Assist of 1 w/ walker.  Complained of back stiffness but did not take any medications for it. Hinson is patent and draining clear, yellow urine. Last BM was 4/25 and patient asked for a laxative. PRN Docusate was given x1. R groin dressing is WDL. Seizure precautions maintained. Continue with plan of care.

## 2019-04-29 NOTE — PLAN OF CARE
Speech Language Therapy Discharge Summary    Reason for therapy discharge:    Discharged to acute rehabilitation facility.    Progress towards therapy goal(s). See goals on Care Plan in [facility] electronic health record for goal details.  Goals partially met.  Barriers to achieving goals:   discharge from facility.    Therapy recommendation(s):    Continued therapy is recommended.  Rationale/Recommendations:  Complete a cognitive linguistic evaluation. Patient discharged on a regular diet and thin liquids with precautions.

## 2019-04-29 NOTE — PROGRESS NOTES
"   04/29/19 1454   Visit Information   Type of Visit Initial;Staff consultation/triage;Distress;Crisis Event   Distress Emotional   Visited Patient;Family   Interventions   Plan of Care Review   With patient/family/proxy   Basic Spiritual Interventions    introduction/orientation to Spiritual Health Services;Assessment of spiritual needs/resources;Reflective conversation;Life Review;Prayer   Advanced Assessments/Interventions   Presenting Concerns/Issues Spiritual/Christian/emotional support;Goals of care discernment;Grief and adjustment issues;Self-concept disturbance;Challenged coping;Stress/self-care   SPIRITUAL HEALTH SERVICES  SPIRITUAL  ASSESSMENT Progress Note  [facility] ([facility] 5R    PRIMARY FOCUS    Goals of Care    Emotional/spiritual/Christian distress    Support for coping    ILLNESS CIRCUMSTANCES:   Reviewed documentation. Responded to referral based on Epic consult.  Reflective conversation shared with Apolinar and his wife, Lorraine, which integrated elements of illness and family narratives.    Context of Serious Illness/Sympton(s)- Apolinar said, \"I need to talk to a  about how mind blowing that this happened is!\" Apolinar detailed the experience of the onset and said,'I felt like I couldn't connect to the world around me at all.  I felt helpless.  I am only 58, how is this happening?\"  Apolinar and Lorraine have two daughters; Vi and Akua. Vi is a special needs person who lives with them, and Akua is  with two dogs.    Resources for Support - His wife, Lorraine and his dtr, Akua and his good friends.  He has a sister and several cousins as well.  His mom is 92 in independent living still.    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 \"I was able to retire b/c of an " inheritance from his uncle.  This experience was a real shocker.'    Holiness Distress - n/a    Social/Cultural/Economic- n/a    SPIRITUAL/Protestant COPING):     [facility] - Denominational. His  came to see him at [facility]. He attends [employer].     Spiritual Practice(s) - Prayer. We shared a prayer of thanksgiving and for continued healing and ease.    Emotional, Relational,Existential Connections- Apolinar said that fishing is important to him and that he just bought a new, smaller boat to take up to [place] for fishing in August where they've been going for twenty years.     GOALS OF CARE:    Goals of Care - To get his mental functions on track again and return home.    Meaning/Sense-Making- Apolinar named the shock of his vulnerability that arrived with his cerebral incident.    PLAN: Will see Apolinar at end of week on ARU.    Rev. Adair-O Habtasha-Ron  Staff   [phone]  * [facility] remains available 24/7 for emergent requests/referrals, either by having the switchboard page the on-call  or by entering an ASAP/STAT consult in Epic (this will also page the on-call ).*

## 2019-04-29 NOTE — PLAN OF CARE
A&Ox4, very forgetful. Repeats questions. VSS on RA. Co 8/10 HA decreased with tylenol. Up SBA w/gb ambulating halls. Hinson patent and with AUO. Tolerating diet. Tele SR. Lidocaine patch for chronic lower back pain. R groin site CDI. Neuros unchanged. No BM, requesting PRN constipation meds in the AM. Possible discharge tomorrow to ARU.

## 2019-04-29 NOTE — PLAN OF CARE
"FOCUS/GOAL  Bladder management, Medical management and Skin integrity    ASSESSMENT, INTERVENTIONS AND CONTINUING PLAN FOR GOAL:  Orientation: Pt is alert and oriented x 4, Flat affect  Bowel: Last BM 4/25/19 per report, scheduled docusate given and fluids encouraged. Pt declined flexiril due to concern of constipation. Bowel sounds are hypoactive in all 4 quadrents, Pt reports passing flatulence.  Bladder: tracy cath in place due to retention.  Pain: Pt c/o \"gas pain\" on LL abd quadrant. No pain with palpation or rebound tenderness, no discoloration observed- will continue to monitor.  Ambulation/Transfers: Assist of 1 with walker/ belt  Blood sugars: Placed back on scheduled metformin per MD order,  at PM, 129 at HS. No orders for BG check, left sticky note for provider to follow up.  Diet/ Liquids: Regular diet/ thin liquids/ takes pills whole  Tubes/ Lines/ Drains: Tracy catheter in place.  Skin: Small dressing in place on R groin area, unknown of date of placement. Will leave on until tomorrow. Rest of skin is clean, dry, and intact. Mild bruising on upper extremities.     Call light within reach, bed alarm on for safety, Ok to continue with care plan.    "

## 2019-04-29 NOTE — PROGRESS NOTES
"   04/29/19 1600   Quick Adds   Type of Visit Initial PT Evaluation   Living Environment   Lives With child(farhad), adult;spouse   Living Arrangements house   Home Accessibility stairs to enter home   Number of Stairs, Main Entrance 2   Stair Railings, Main Entrance none   Transportation Anticipated family or friend will provide   Living Environment Comment pt lives with wife and adult daughter, 1 platform JOYCE with no rail, tub/shower combo.   Self-Care   Usual Activity Tolerance good   Current Activity Tolerance fair   Regular Exercise Yes   Activity/Exercise Type walking   Exercise Amount/Frequency daily   Equipment Currently Used at Home walker, rolling   Activity/Exercise/Self-Care Comment Pt recently retired 1 year ago.Pt does walk at gym everyday at [employer] ~30-45 min daily.   Functional Level Prior   Ambulation 0-->independent   Transferring 0-->independent   Toileting 0-->independent   Bathing 0-->independent   Communication 0-->understands/communicates without difficulty   Swallowing 0-->swallows foods/liquids without difficulty   Cognition 0 - no cognition issues reported   Fall history within last six months no   Which of the above functional risks had a recent onset or change? ambulation;transferring;toileting;bathing;dressing;cognition   Prior Functional Level Comment IND in all cares and mobility.   General Information   Onset of Illness/Injury or Date of Surgery - Date 04/22/19   Referring Physician Amirah Hurst MD (Dr. Castelan)   Patient/Family Goals Statement \"be without walker\"   Pertinent History of Current Problem (include personal factors and/or comorbidities that impact the POC) PMH, clinician impression   Precautions/Limitations fall precautions;seizure precautions   Weight-Bearing Status - LUE full weight-bearing   Weight-Bearing Status - RUE full weight-bearing   Weight-Bearing Status - LLE full weight-bearing   Weight-Bearing Status - RLE full weight-bearing   Heart Disease Risk Factors " Diabetes;High blood pressure;Overweight;Stress;Medical history;Gender;Age   General Observations pt appears mildly confused and easily irritated through polite.   General Info Comments Pt currently unemployed for past year, wife works full time. They have 30-year old special needs daughter who wife typically assists with (no physical needs, mostly ADLs)   Cognitive Status Examination   Orientation person;time   Level of Consciousness alert   Follows Commands and Answers Questions 100% of the time   Personal Safety and Judgment intact   Memory intact   Cognitive Comment difficulty with word finding.   Pain Assessment   Patient Currently in Pain Yes, see Vital Sign flowsheet   Integumentary/Edema   Integumentary/Edema no deficits were identifed   Posture    Posture Forward head position;Protracted shoulders   Range of Motion (ROM)   ROM Comment all extremeties WFL though does demo hamstring and gastroc tightness B   Strength   Strength Comments L LE: hip flexion  5/5, knee ext 5/5, DF/PF 5/5. R LE: hip flexion 4/5, knee ext 5/5, DF/PF 5/5. UEs >3/5 WFL per screen.   ARC Assessment Only   Acute Rehab FIM See FIM scores for Mobility/ADL Assessment   Balance   Balance Comments able to stand statically without UE support with SBA x30 sec   Sensory Examination   Sensory Perception no deficits were identified   Sensory Perception Comments light touch and proprioception intact in B LEs   Coordination   Coordination no deficits were identified   Coordination Comments no impairments noted with heel slide, rapid sup/pro, or nose/finger   Muscle Tone   Muscle Tone no deficits were identified   General Therapy Interventions   Planned Therapy Interventions bed mobility training;balance training;gait training;neuromuscular re-education;ROM;strengthening;stretching;transfer training;groups;risk factor education;home program guidelines;progressive activity/exercise   Clinical Impression   Criteria for Skilled Therapeutic Intervention  yes, treatment indicated   PT Diagnosis impaired functional mobility   Influenced by the following impairments weakness, reduced activity tolerance, impaired balance, pain   Functional limitations due to impairments bed mobility, transfers, gait, stairs, regular exercise routine, assisting with daugther's needs, community mobility   Clinical Presentation Stable/Uncomplicated   Clinical Presentation Rationale PMH, clinician impression   Clinical Decision Making (Complexity) Moderate complexity   Therapy Frequency` daily  (60 min)   Predicted Duration of Therapy Intervention (days/wks) 7 days   Anticipated Equipment Needs at Discharge   (none)   Anticipated Discharge Disposition Home with Assist;Home with Outpatient Therapy   Risk & Benefits of therapy have been explained Yes   Patient, Family & other staff in agreement with plan of care Yes   Clinical Impression Comments given current level of function, anticipate return home with assist as needed Qamar with FWW vs IND no AD + OP PT   Total Evaluation Time   Total Evaluation Time (Minutes) 30

## 2019-04-29 NOTE — PROGRESS NOTES
Vascular Neurology Progress Note    ____________________________________________________________    Stroke-Neurocritical Care Note  Admission Summary  Apolinar Mcgregor is an 58 year old male with HTN, GERD, and urinary retention admitted on 4/22/2019 for aphasia with AMS secondary to left occipital parietal ICH.  CTA/CTV unrevealing.  MRI Brain unrevealing.  On admission patient was agitated with right lateral tongue bite most likely secondary to seizure.  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  On 4/26 patient underwent embolization with obliteration of dAVF by Dr. Raffy Thompson.     Last 24 hours:  BPs well controlled     Impression:   1. 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 Seizure secondary to #1     Recommendations:  1. Long-term BP goal < 140/90  --has been well controlled  2. Keppra 500mg BID until Neuro follow-up for seizure associated with ICH  3. Q8 neurochecks  4. PT/OT/Speech  5. SCDs, DVT chemoprophylaxis  6. Follow-up with Dr. Raffy Thompson including repeat cerebral angiogram    Patient is ok to discharge from a stroke standpoint     Uche Tavarez MD, MS  Neurology     Please contact the stroke-neurocritical care service with any questions: link to Text page     ____________________________________________________________________        Medications:      Current Facility-Administered Medications:      0.9% sodium chloride BOLUS, 250 mL, Intravenous, Once PRN, Vi Armando MD     acetaminophen (TYLENOL) tablet 650 mg, 650 mg, Oral, Q4H PRN, 650 mg at 04/28/19 1950 **OR** acetaminophen (TYLENOL) Suppository 650 mg, 650 mg, Rectal, Q4H PRN, Vi Armando MD     amLODIPine (NORVASC) tablet 5 mg, 5 mg, Oral, Daily, Vi Armando MD, 5 mg at 04/28/19 0901     artificial saliva (BIOTENE DRY MOUTHWASH) liquid 10 mL, 10 mL, Swish & Spit, 4x Daily, Bandar Story MD     bisacodyl (DULCOLAX) Suppository 10 mg, 10 mg, Rectal, Daily PRN, Vi Armando MD, 10 mg at  04/28/19 0230     calcium carbonate (TUMS) chewable tablet 500 mg, 500 mg, Oral, Daily PRN, Vi Armando MD, 500 mg at 04/28/19 2131     cyclobenzaprine (FLEXERIL) tablet 10 mg, 10 mg, Oral, TID, Vi Armando MD, 10 mg at 04/28/19 2129     glucose gel 15-30 g, 15-30 g, Oral, Q15 Min PRN **OR** dextrose 50 % injection 25-50 mL, 25-50 mL, Intravenous, Q15 Min PRN **OR** glucagon injection 1 mg, 1 mg, Subcutaneous, Q15 Min PRN, Obdulia Murphy PA-C     docusate sodium (COLACE) capsule 100 mg, 100 mg, Oral, BID PRN, Vi Armando MD     heparin sodium injection 5,000 Units, 5,000 Units, Subcutaneous, Q12H, Uche Tavarez MD, 5,000 Units at 04/28/19 1950     hydrALAZINE (APRESOLINE) injection 10-20 mg, 10-20 mg, Intravenous, Q4H PRN, Vi Armando MD, 10 mg at 04/27/19 0311     HYDROmorphone (PF) (DILAUDID) injection 0.3-0.5 mg, 0.3-0.5 mg, Intravenous, Q2H PRN, Vi Armando MD, 0.2 mg at 04/26/19 1937     hypromellose-dextran (ARTIFICAL TEARS) 0.1-0.3 % ophthalmic solution 2 drop, 2 drop, Ophthalmic, Q1H PRN, Vi Armando MD     labetalol (NORMODYNE/TRANDATE) syringe 10-20 mg, 10-20 mg, Intravenous, Q10 Min PRN, Vi Armando MD, 10 mg at 04/27/19 4689     levETIRAcetam (KEPPRA) intermittent infusion 500 mg, 500 mg, Intravenous, Q12H, Vi Armando MD, 500 mg at 04/28/19 7605     Lidocaine (LIDOCARE) 4 % Patch 1 patch, 1 patch, Transdermal, Q24h, 1 patch at 04/28/19 1950 **AND** lidocaine patch REMOVAL, , Transdermal, Q24H **AND** lidocaine patch in PLACE, , Transdermal, Q8H, Vi Armando MD     Medication Instructions: No D5W IV solutions unless patient hypoglycemic., , Does not apply, Continuous PRN, Vi Armando MD     metoclopramide (REGLAN) tablet 10 mg, 10 mg, Oral, Q6H PRN **OR** metoclopramide (REGLAN) injection 10 mg, 10 mg, Intravenous, Q6H PRN, Vi Armando  "MD Cheryl, 10 mg at 04/26/19 2130     naloxone (NARCAN) injection 0.1-0.4 mg, 0.1-0.4 mg, Intravenous, Q2 Min PRN, Vi Armando MD     OLANZapine (zyPREXA) injection 10 mg, 10 mg, Intramuscular, Daily PRN, Vi Armando MD, 10 mg at 04/22/19 1055     [START ON 4/29/2019] omeprazole (priLOSEC) CR capsule 20 mg, 20 mg, Oral, QAM, Vi Armando MD     ondansetron (ZOFRAN-ODT) ODT tab 4 mg, 4 mg, Oral, Q6H PRN **OR** ondansetron (ZOFRAN) injection 4 mg, 4 mg, Intravenous, Q6H PRN, Vi Armando MD     polyethylene glycol (MIRALAX/GLYCOLAX) Packet 17 g, 17 g, Oral, BID PRN, Vi Armando MD, 17 g at 04/28/19 0920     prochlorperazine (COMPAZINE) injection 10 mg, 10 mg, Intravenous, Q6H PRN **OR** prochlorperazine (COMPAZINE) tablet 10 mg, 10 mg, Oral, Q6H PRN **OR** prochlorperazine (COMPAZINE) Suppository 25 mg, 25 mg, Rectal, Q12H PRN, Vi Armando MD     senna-docusate (SENOKOT-S/PERICOLACE) 8.6-50 MG per tablet 1-2 tablet, 1-2 tablet, Oral, BID PRN, Vi Armando MD, 2 tablet at 04/28/19 0920     tamsulosin (FLOMAX) capsule 0.4 mg, 0.4 mg, Oral, Daily, Vi Armando MD, 0.4 mg at 04/28/19 1806      Vital Signs:  B/P: 121/81, T: 98, P: 82, R: 16    /70 (BP Location: Left arm)   Pulse 82   Temp 97.7  F (36.5  C) (Oral)   Resp 16   Ht 1.778 m (5' 10\")   Wt 95.2 kg (209 lb 14.4 oz)   SpO2 95%   BMI 30.12 kg/m    General: Awake and alert, not in any acute distress, cooperative  HEENT: Atraumatic, normocephalic, no scleral icterus or conjunctival pallor   Cardiac: RRR  Chest: Clear to auscultation  Abdomen: Soft, non-tender, non-distended  Extremities: No LE swelling.  Skin: No rash or lesion.   Psych: Normal mood and affect     Neuro:  Mental status: Awake, alert, attentive, oriented x3. Speech is fluent, comprehension and repetition intact.  Hesitation and difficulty with higher level conversation.  " Difficulties with short-term memory and impulsivity.  No dysarthria.  No neglect.  Cranial nerves:  Pupils equal and reactive.  EOMI, visual fields full, face symmetric, facial sensation intact, shoulder shrug strong, palate rise symmetric, tongue/uvula midline, hearing intact to conversation.  Motor: Tone normal. 5/5 strength in all 4 extremities.  Sensory: Intact to light touch, temp.    Coordination: Finger nose finger intact bilaterally, no dysmetria, normal heel-shin test bilaterally  Gait: deferred    Labs/Studies:  CBC:     Recent Labs   Lab 04/28/19  0834 04/27/19  0452 04/26/19  0721   WBC 8.4 9.3 7.0   RBC 4.53 4.47 5.02   HGB 13.5 13.6 14.9   HCT 39.7* 39.0* 43.6    173 158     Basic Metabolic Panel:   Recent Labs   Lab Test 04/28/19  0834 04/27/19  0452 04/26/19  0721    143 139   POTASSIUM 3.8 4.2 3.8   CHLORIDE 109 112* 108   CO2 24 24 25   BUN 12 12 14   CR 0.83 0.69 0.91   * 136* 127*   ALAN 8.7 8.4* 8.9     Liver panel:  Recent Labs   Lab Test 04/22/19  1050   PROTTOTAL 8.5   ALBUMIN 4.1   BILITOTAL 0.4   ALKPHOS 101   AST 25   ALT 43     INR:  Recent Labs   Lab Test 04/24/19  1025 04/22/19  1050 04/30/13  1332   INR 1.04 1.17* 1.01      Lipid Profile:  Recent Labs   Lab Test 04/24/19  1025   CHOL 116   HDL 49   LDL 50   TRIG 84     A1C:   Recent Labs   Lab Test 04/24/19  1025   A1C 6.3*     Troponin I:   Recent Labs   Lab Test 03/11/13  0350 03/10/13  2140 03/10/13  1615 03/10/13  1147   TROPI <0.012 <0.012 <0.012 <0.012         Imaging:  Relevant findings as per the Impression above.

## 2019-04-29 NOTE — DISCHARGE SUMMARY
[facility]  Discharge Summary  Hospitalist    Date of Admission:  4/22/2019  Date of Discharge:  4/29/2019 to [facility]  Discharging Provider: Vi Armando MD    Discharge Diagnoses   Acute parenchymal hemorrhage  Left parietal and occipital spontaneous intraparenchymal hemorrhage due to dural AVF  Cerebral edema secondary to above  AMS due to above.  Resolved   Anion gap metabolic acidosis, resolved   Hypertension   Urinary Retention  Suspect BPH  Chronic Back Pain  Dry Mouth  Poor Dentition  Type 2 DM  GERD    History of Present Illness   Apolinar Mcgregor is a 58 year old male with a history of HTN who presented to the ED with altered mental status and was admitted 4/22/19 with acute parenchymal hemorrhage. Please see admission H&P for complete details.     Hospital Course   Apolinar Mcgregor was admitted on 4/22/2019.  The following problems were addressed during his hospitalization:    Acute parenchymal hemorrhage  Left parietal and occipital spontaneous intraparenchymal hemorrhage due to dural AVF  Cerebral edema secondary to above  AMS due to above.  Resolved   Patient initially presented with AMS.  Found to have an acute parenchymal hemorrhage in the left parietal and occipital lobs measuring up to 4.7 cm with 4 mm rightward midline shift.  Wife does not know of any traumatic head injuries.  Does have a history of HTN but blood pressure well controlled on presentation.  UDS negative.  ED did speak with neurosurgery who recommended admission to ICU and continued monitoring.  Repeat head CT showed stable hemorrhage.  MRI of the brain on 4/23 showed hemorrhage and mass-effect were stable and without any obvious masses or abnormalities.  Angiogram showed a left occipital dural aVF.  Repeat CT head showed stable hemorrhage   MRI brain on 4/23 showed the hemorrhage and mass effect were stable and without any obvious masses or abnormalities  - Goal SBP <160 with PRN Labetalol and Hydralazine  -  PT/OT/Speech consulted.  Recommended ARU at discharge.  - Neurosurgery and neurology consulted.  Appreciate their assistance.  No plans for surgical intervention at this time. Underwent cerebral angiogram.    -s/p dAVF embolization on 4/26 and admitted to ICU  -Keppra per neurology     Anion gap metabolic acidosis, resolved   Given the bleed it is possible the patient had a seizure and this could have elevated his lactate.      Hypertension   Blood pressures well controlled at this time without intervention  - Restarted PTA Norvasc      Urinary Retention  Suspect BPH  Developed overnight 4/27=>28 resulting in multiple straight caths and then tracy placement. Patient reports nocturia, 3-4 times a night for many years.   -continue tracy for now. Plan to discontinue prior to ARU transfer  -started flomax  -suggest TOV in 1-2 days     Chronic Back Pain  -acetaminophen as needed  -flexeril TID  -lidoderm patch     Dry Mouth  Poor Dentition  No evidence for thrush. Suspect dry mouth from hospitalization and whitish tongue build-up due to poor oral hygiene. No white patches on buccal mucosa, hard palate nor posterior pharynx.  -optimize oral hygiene  -biotene prn     Type 2 DM  On metformin PTA, not continued during hospitalization.  -continue corrective dose aspart as needed  -resume metformin at discharge      GERD  -resumed PTA PPI    Vi Armando MD  ~~~~~~~~~~~~~~~~~~~~~~~~~~~~~~~~~~~~~~~~~~~~~~~~~~~~~~~~~~~    Pending Results   These results will be followed up by Hospitalist.  Unresulted Labs Ordered in the Past 30 Days of this Admission     No orders found from 2/21/2019 to 4/23/2019.          Code Status   Full Code       Primary Care Physician   Luis Fernando Rodriguez    Physical Exam   Temp: 98.3  F (36.8  C) Temp src: Oral BP: (!) 140/91   Heart Rate: 90 Resp: 16 SpO2: 97 % O2 Device: None (Room air)    Vitals:    04/24/19 0530 04/27/19 0500 04/28/19 0150   Weight: 97.9 kg (215 lb 12.8 oz) 97.6 kg (215 lb 2.7  oz) 95.2 kg (209 lb 14.4 oz)     Vital Signs with Ranges  Temp:  [97.7  F (36.5  C)-98.7  F (37.1  C)] 98.3  F (36.8  C)  Heart Rate:  [90-96] 90  Resp:  [16-18] 16  BP: (118-145)/(70-91) 140/91  SpO2:  [94 %-97 %] 97 %  I/O last 3 completed shifts:  In: 540 [P.O.:540]  Out: 4550 [Urine:4550]    Constitutional: Alert, NAD, pleasant and cooperative    Discharge Disposition   Discharged to rehabilitation facility  Condition at discharge: Stable    Consultations This Hospital Stay   PHYSICAL THERAPY ADULT IP CONSULT  OCCUPATIONAL THERAPY ADULT IP CONSULT  SPEECH LANGUAGE PATH ADULT IP CONSULT  SOCIAL WORK IP CONSULT  SMOKING CESSATION PROGRAM IP CONSULT  PHARMACY IP CONSULT  NEUROLOGY IP CONSULT  NEUROSURGERY IP CONSULT  PHYSICAL THERAPY ADULT IP CONSULT  OCCUPATIONAL THERAPY ADULT IP CONSULT    Time Spent on this Encounter   IVi, personally saw the patient today and spent 35 minutes discharging this patient.    Discharge Orders      CT Head w/o Contrast     Follow-up and recommended labs and tests     [facility] 4 weeks with head CT prior.  Please call the clinic at [phone] to schedule your appointment.     General info for SNF    Length of Stay Estimate: Short Term Care: Estimated # of Days <30  Condition at Discharge: Stable  Level of care:skilled   Rehabilitation Potential: Excellent  Admission H&P remains valid and up-to-date: Yes  Recent Chemotherapy: N/A  Use Nursing Home Standing Orders: Yes     Mantoux instructions    Give two-step Mantoux (PPD) Per Facility Policy Yes     Hinson catheter    To straight gravity drainage. Change catheter every 2 weeks and PRN for leaking or decreased uring output with signs of bladder distention. DO NOT change catheter without a specific MD order IF diagnosis of benign prostatic hypertrophy (BPH), neurogenic bladder, or other urological conditions     Activity - Up with assistive device     Full Code     Physical  Therapy Adult Consult    Evaluate and treat as clinically indicated.    Reason:  Physical deconditioning     Occupational Therapy Adult Consult    Evaluate and treat as clinically indicated.    Reason:  Physical deconditioning     Fall precautions     Seizure precautions     Advance Diet as Tolerated    Follow this diet upon discharge:       Moderate Consistent CHO Diet     Discharge Medications   Current Discharge Medication List      START taking these medications    Details   artificial saliva (BIOTENE DRY MOUTHWASH) LIQD liquid Swish and spit 10 mLs in mouth 4 times daily    Associated Diagnoses: Dry mouth      cyclobenzaprine (FLEXERIL) 10 MG tablet Take 1 tablet (10 mg) by mouth 3 times daily    Associated Diagnoses: Chronic bilateral low back pain without sciatica      docusate sodium (COLACE) 100 MG capsule Take 1 capsule (100 mg) by mouth 2 times daily as needed for constipation    Associated Diagnoses: Constipation, unspecified constipation type      levETIRAcetam (KEPPRA) 500 MG tablet Take 1 tablet (500 mg) by mouth 2 times daily    Associated Diagnoses: Seizure disorder (H)      Lidocaine (LIDOCARE) 4 % Patch Place 1 patch onto the skin every 24 hours    Associated Diagnoses: Chronic bilateral low back pain without sciatica      tamsulosin (FLOMAX) 0.4 MG capsule Take 1 capsule (0.4 mg) by mouth daily    Associated Diagnoses: Urinary retention         CONTINUE these medications which have NOT CHANGED    Details   amLODIPine (NORVASC) 5 MG tablet Take 5 mg by mouth daily      metFORMIN (GLUCOPHAGE-XR) 500 MG 24 hr tablet Take 500-1,000 mg by mouth daily (with breakfast)      Omeprazole (PRILOSEC PO) Take 20 mg by mouth every morning.           Allergies   No Known Allergies  Data

## 2019-04-29 NOTE — PROGRESS NOTES
SPIRITUAL HEALTH SERVICES Progress Note  [facility]    Visit per LOS.  Pt's partner at bedside, said pt's  has been to visit: pt does not have any SH needs at this time as he is ready to discharge.  No plan to follow.      Zoya Landaverde  Chaplain Resident

## 2019-04-29 NOTE — PLAN OF CARE
AOx4, very forgetful. VSS. Mod CHO diet. SBA + walker. Tele: NSR. L/R PIV SL. C/o 6/10 back/hip pain - PRN tylenol, lidocaine patch. Neuros intact. Sravan patent. R groin site CDI. Discharge possible 4/29 to ARU.

## 2019-04-29 NOTE — PLAN OF CARE
Discharge Planner PT   Patient plan for discharge: ARC today  Current status: Back pain primary complaint this am. SBA for bed mobility with cueing for logroll due to back pain, pt requiring increased time.  CGA for sit->stand transfer.  Ambulated 225' with intermittent use of wall rail CGA.  Slow pace, shortened step length- pt reports increase in back pain with increasing step length.  Session shortened as transport arrived for ARC.  Barriers to return to prior living situation: fall risk, level of assist with mobility, see OT for cog needs  Recommendations for discharge: ARC  Rationale for recommendations: Pt participating well in rehab, will tolerate 3 hrs of therapy a day and requires comprehensive rehab to address deficits for return to prior level of independence.  Spouse present and supportive.       Entered by: Gerri Morris 04/29/2019 10:29 AM     Physical Therapy Discharge Summary    Reason for therapy discharge:    Discharged to acute rehabilitation facility.    Progress towards therapy goal(s). See goals on Care Plan in [facility] electronic health record for goal details.  Goals not met.  Barriers to achieving goals:   discharge from facility.    Therapy recommendation(s):    Continued therapy is recommended.  Rationale/Recommendations:  goals written for independence, pt requiring CGA and device for mobility.  Rec continued rehab to return to prior level of indepence in home enviroment..

## 2019-04-29 NOTE — H&P
"[facility]   Acute Rehabilitation Unit  Admission History and Physical      HISTORY OF PRESENT ILLNESS  Apolinar \"Al\" HECTOR Mcgregor is a 58 year old male with PMH significant for HTN, GERD, DMT2, chronic back pain, urinary retention, and constipation who presented to [facility] ED on 4/22/19 with altered mental status and was found to have an acute parenchymal hemorrhage. The hemorrhage was found in the left parietal and occipital lobes, measuring ~4.7cm with 4mm rightward midline shift. Repeat CT and MRI after presentation showed stable hemorrhage and mass effect. CTA showed left occipital dural AVF. Patient underwent dAVF emobolization on 4/26. Keppra was initiated per neurology. NSGY consulted during acute admission, no surgical intervention required. Patient's hospital course complicated by urinary retention, requiring continued SIC and eventual tracy placement. Retention thought to be due to BPH, flomax also started during acute stay.     Functionally, after this incident the patient has been noted to have decreased strength, BLE coordination, and motor planning. Patient also noted to have impaired cognition and balance. During acute stay he scored a 39/56 on the PACHECO. He was ambulating 200-300 feet with CGA and no assistive device with flat foot gait pattern. Patient is sit-stand with CGA to min A and verbal cuing. He is overall CGA for standing balance while participating in ADLs. He is able to follow commands, but needs increased time for processing.     Upon admission patient denies any pain. He denies any changes to his vision or hearing. No issues with speech or swallow. He does admit that he feels his processing time is slowed. He feels it is slowed when he is trying to get something out, he knows what he wants to say, but has to think about it harder than previously. He denies any issues with comprehension. Patient denies SOB, chest pain, nausea. He does " endorse mild LLQ abdominal pain that he feels is due to gas and constipation. He says his last BM was two days ago. Prior to admission he was having issues with constipation at home. He also endorses a history of GERD and asks for tums this afternoon. Patient denies any numbness, tingling, weakness of BUE and BLE. He admits to some issues with balance and coordination       PAST MEDICAL HISTORY  Past Medical History:   Diagnosis Date     Gastro-oesophageal reflux disease      Hypertension      Urinary retention        SURGICAL HISTORY  Past Surgical History:   Procedure Laterality Date     CHOLECYSTECTOMY  2000     IR CAROTID CEREBRAL ANGIOGRAM BILATERAL  4/24/2019     IR CAROTID CEREBRAL ANGIOGRAM LEFT  4/26/2019     URETHROPLASTY WITH BUCCAL GRAFT  6/12/2013    Procedure: URETHROPLASTY WITH BUCCAL GRAFT;  Anterior Urethroplasty with Buccal Graft ;  Surgeon: Dmitry Moore MD;  Location:  OR       SOCIAL HISTORY  Patient is a former smoker. He does drink alcohol occasionally. He denies illicit drug use.    FAMILY HISTORY  Mother - lung cancer.     PRIOR FUNCTIONAL HISTORY   Pt was independent with all ADLs/IADLs, transfers, mobility and gait.    Patient lives in a house in [place] with his wife and daughter. His wife works full time at [employer]. His daughter is 27 years old had developmental / cognitive disability and participates in an adult day program for persons with disabilities. The patient and his wife are the primary caregivers for their adult daughter. The patient's sister is expected to fly in from [facility] this week and will be staying to assist her brother at the home for an unknown period of time. In the patient's home there are two steps to enter. The first step is larger than normal (per pt and wife) and the second step is normal size. There is an additional entrance with 2 small/normal steps, but they do not utilize this entrance much. Bed, bath are all on one/first level,  there are numerous steps to the basement but the patient does not need to access.   Prior to hospitalization, patient was not working. He is retired. His hobbies include reading, walking. He had started going to the gym 3x/week recently.       MEDICATIONS  Medications Prior to Admission   Medication Sig Dispense Refill Last Dose     amLODIPine (NORVASC) 5 MG tablet Take 5 mg by mouth daily   4/29/2019 at 833     artificial saliva (BIOTENE DRY MOUTHWASH) LIQD liquid Swish and spit 10 mLs in mouth 4 times daily   4/29/2019 at 1023     cyclobenzaprine (FLEXERIL) 10 MG tablet Take 1 tablet (10 mg) by mouth 3 times daily   4/29/2019 at 833     levETIRAcetam (KEPPRA) 500 MG tablet Take 1 tablet (500 mg) by mouth 2 times daily   4/29/2019 at 1023     Lidocaine (LIDOCARE) 4 % Patch Place 1 patch onto the skin every 24 hours   4/28/2019 at 1950     Omeprazole (PRILOSEC PO) Take 20 mg by mouth every morning.   4/29/2019 at 833     [START ON 4/30/2019] tamsulosin (FLOMAX) 0.4 MG capsule Take 1 capsule (0.4 mg) by mouth daily   4/29/2019 at 833     docusate sodium (COLACE) 100 MG capsule Take 1 capsule (100 mg) by mouth 2 times daily as needed for constipation   Unknown at Unknown time     metFORMIN (GLUCOPHAGE-XR) 500 MG 24 hr tablet Take 500-1,000 mg by mouth daily (with breakfast)   Unknown at Unknown time       ALLERGIES  No Known Allergies      REVIEW OF SYSTEMS  10 point ROS negative apart from what is listed in HPI.       PHYSICAL EXAM  VITAL SIGNS:  /87   Pulse 104   Temp 97.5  F (36.4  C) (Oral)   Resp 16   Ht 1.829 m (6')   Wt 93.9 kg (207 lb)   SpO2 97%   BMI 28.07 kg/m    BMI:  Estimated body mass index is 28.07 kg/m  as calculated from the following:    Height as of this encounter: 1.829 m (6').    Weight as of this encounter: 93.9 kg (207 lb).   Gen: well appearing, no acute distress, resting in bed, wife at bedside  HEENT: EOMI, normal conjunctivae, moist mucosae  Resp: CTAB, good air entry  CV: RRR,  no murmurs  GI: S, ND, hypoactive bowel sounds, mildly tender to deep palpation in LLQ  Extremities: warm, well perfused, without edema, no tenderness   Psych: appropriate affect  MSK/neuro:   Mental Status:  alert and oriented to person, time, president. Patient thought we were at [facility] and could not report which hospital he was at after he was told this was not [facility]. He thought we were in [place], but when told we were at the  of  and provider asked what city that was in then the patient responded [place]    Cranial Nerves: II-XII grossly within normal limits   Sensory: Normal to light touch in the bilateral upper/lower extremities   Strength: 5/5 in all muscle groups of the bilateral upper/lower extremities   Reflexes: present, more hyperreflexic on the left vs. right   Rey's test: negative bilaterally   Abnormal movements: None, no clonus    Coordination: Question of there is a little bit of right sensory neglect.  some mild slowing is noted bit worse on right than left.  When reaching up to shake my hand, he started by using his left and turning backwards even though the right hand was readily accessible.  When reaching up he went on to bump the back of his hand on the lap tray.   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 Functional impairments with balance, ADLs, cognition.  Medical comorbidities including HTN, GERD, DMT2, chronic back pain, urinary retention, and constipation. Appropriate for inpatient acute rehabilitation.    Impairment group code: 01.4 (no paresis stroke); L parietal-occipital parenchymal hemorrhage due to dural AVF now s/p endovascular treatment.     PLAN of CARE:  1. Mr. Mcgregor should receive 60 min each of physical, occupational, speech therapies in addition  to rehab nursing and close management by physiatry.    2. Mobility: patient able to ambulate 200-300ft with CGA and without assistive device. Biggest issues with mobility include balance and coordination. Scored 39/56 on PACHECO. Goal is to improve balance, coordination, strength, and endurance in order for patient to discharge home safely as mod ind or better and to navigate at least 2 stairs in order to enter his home.   3. Activities of daily living: patient CGA to min A upon admission with ADLs, also at times requiring verbal cuing. Standing tolerance at least 2 minutes. Sit-stand min A. Goal is to improve strength and cognition in order to perform ADLs with set up assist or better in order to safely discharge home.   4. Cognition language: patient presenting with slowed cognitive processing speeds vs. Mild word finding difficulties of speech. Full cognitive-linguistic eval to be performed at ARU. Goal is for patient to improve speech and cognition in order to complete ADLs, understand safety, and participate in daily communication.   5. Swallow/nutrition: No issues with swallow upon admission to rehab. Regular diet, thin liquids.   6. Rehab nursing: to assist and educate regarding environmental safety, medication management, self cares, bowel and bladder function, etc. Goal is for patient and / or family member to be responsible for these things at home.   7. Bowel function: constipated, last BM 2 days prior to admission. Patient requiring bowel meds for bowel movement at OSH. Had been seen by GI as outpatient regarding such issues. PRN bowel meds available, encourage use. Prune juice may be adjuvant option.   8. Bladder function: patient experienced some elevated PVRs at OSH, requiring consistent SIC, tracy placed over the weekend. Flomax started, will continue. TOV in 1-2 days. PVRs x 3.     Medical Conditions  1. Acute Parenchymal Hemorrhage due to dAVF: present in occipital and parietal lobes, caused 4mm  rightward midline shift. Patient is s/p emoblization on .     Goal SBP < 160    Keppra started per neurology, follow-up as outpatient    PT, OT, SLP for deficits   2. HTN: initially suspected as cause for hemorrhage, but controlled upon presentation. Continue PTA amlodipine 5mg. Monitor, goal as above.   3. GERD: continue omeprazole 20mg, tums PRN   4. DMT2: metformin held during acute stay, instructed to restart per discharging hospitalist team  5. Chronic Back Pain: tylenol PRN, flexiril TID - may decreased to PRN, lidoderm patch PRN  6. Adjustment to disability:  Clinical psychology to eval and treat if needed   7. DVT Prophylaxis: SCDs at night, ambulating  8. GI Prophylaxis: PTA omperazole  9. Code: FULL  10. Anticipated disposition: home with family   11. ELOS:  5-7 days.  12. Rehab prognosis:  good  13. Follow up Appointments on Discharge: PCP, Neurology, PM&R    Amirah Hurst, DO  PGY2 PM&R Resident    Physician Attestation   I, Alberto Castelan, personally examined and evaluated Apolinar Chi) on admission.  I discussed the patient with the resident and care team, and agree with the assessment and plan of care as documented.    I personally reviewed vital signs, medications, labs and imaging.  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 Has definite balance deficit and impaired coordination. Pradhan 39/56.  Not so much weakness but does require assistance for mobility though once up was able to go upwards to 300 feet.  May have some proprioceptive integration deficits as well to support further.  Some mild cognitive processing slowing is noted and possibly mild word finding difficulties.  These are all below previously normal functional baseline.   Comorbid medical conditions being managed: Hypertension, diabetes, gastroesophageal reflux  Will benefit from intensive rehabilitation includin  minutes each of PT, OT and SLP, Rehabilitation nursing  Close management by physiatry  Prognosis: Overall deficits from the stroke are on the milder side and I am optimistic he will has some good ongoing functional improvements and be able to discharge home with some ongoing likely outpatient physical and occupational therapies, possibly speech therapy as well.     Estimated length of stay: 5-7 days.  Alberto Castelan MD  Date of Service (when I saw the patient): 04/29/19

## 2019-04-30 LAB
GLUCOSE BLDC GLUCOMTR-MCNC: 116 MG/DL (ref 70–99)
GLUCOSE BLDC GLUCOMTR-MCNC: 117 MG/DL (ref 70–99)

## 2019-04-30 PROCEDURE — 97166 OT EVAL MOD COMPLEX 45 MIN: CPT | Mod: GO | Performed by: STUDENT IN AN ORGANIZED HEALTH CARE EDUCATION/TRAINING PROGRAM

## 2019-04-30 PROCEDURE — 00000146 ZZHCL STATISTIC GLUCOSE BY METER IP

## 2019-04-30 PROCEDURE — 92523 SPEECH SOUND LANG COMPREHEN: CPT | Mod: GN

## 2019-04-30 PROCEDURE — 97112 NEUROMUSCULAR REEDUCATION: CPT | Mod: GP

## 2019-04-30 PROCEDURE — 97530 THERAPEUTIC ACTIVITIES: CPT | Mod: GP

## 2019-04-30 PROCEDURE — 25000132 ZZH RX MED GY IP 250 OP 250 PS 637: Performed by: PHYSICAL MEDICINE & REHABILITATION

## 2019-04-30 PROCEDURE — 25000132 ZZH RX MED GY IP 250 OP 250 PS 637: Performed by: STUDENT IN AN ORGANIZED HEALTH CARE EDUCATION/TRAINING PROGRAM

## 2019-04-30 PROCEDURE — 97110 THERAPEUTIC EXERCISES: CPT | Mod: GP

## 2019-04-30 PROCEDURE — 97535 SELF CARE MNGMENT TRAINING: CPT | Mod: GO | Performed by: STUDENT IN AN ORGANIZED HEALTH CARE EDUCATION/TRAINING PROGRAM

## 2019-04-30 PROCEDURE — 12800006 ZZH R&B REHAB

## 2019-04-30 PROCEDURE — 97116 GAIT TRAINING THERAPY: CPT | Mod: GP

## 2019-04-30 RX ORDER — SIMETHICONE 80 MG
80 TABLET,CHEWABLE ORAL 4 TIMES DAILY PRN
Status: DISCONTINUED | OUTPATIENT
Start: 2019-04-30 | End: 2019-05-05 | Stop reason: HOSPADM

## 2019-04-30 RX ORDER — CYCLOBENZAPRINE HCL 5 MG
10 TABLET ORAL 3 TIMES DAILY PRN
Status: DISCONTINUED | OUTPATIENT
Start: 2019-04-30 | End: 2019-05-05 | Stop reason: HOSPADM

## 2019-04-30 RX ADMIN — LEVETIRACETAM 500 MG: 500 TABLET, FILM COATED ORAL at 20:15

## 2019-04-30 RX ADMIN — BISACODYL 10 MG: 10 SUPPOSITORY RECTAL at 05:14

## 2019-04-30 RX ADMIN — DOCUSATE SODIUM 100 MG: 100 CAPSULE, LIQUID FILLED ORAL at 05:14

## 2019-04-30 RX ADMIN — ACETAMINOPHEN 650 MG: 325 TABLET, FILM COATED ORAL at 05:14

## 2019-04-30 RX ADMIN — TAMSULOSIN HYDROCHLORIDE 0.4 MG: 0.4 CAPSULE ORAL at 08:18

## 2019-04-30 RX ADMIN — ACETAMINOPHEN 650 MG: 325 TABLET, FILM COATED ORAL at 13:01

## 2019-04-30 RX ADMIN — Medication 10 ML: at 13:01

## 2019-04-30 RX ADMIN — METFORMIN HYDROCHLORIDE 500 MG: 500 TABLET, EXTENDED RELEASE ORAL at 16:49

## 2019-04-30 RX ADMIN — LEVETIRACETAM 500 MG: 500 TABLET, FILM COATED ORAL at 08:18

## 2019-04-30 RX ADMIN — OMEPRAZOLE 20 MG: 20 CAPSULE, DELAYED RELEASE ORAL at 06:00

## 2019-04-30 RX ADMIN — SIMETHICONE CHEW TAB 80 MG 80 MG: 80 TABLET ORAL at 23:36

## 2019-04-30 RX ADMIN — CYCLOBENZAPRINE HYDROCHLORIDE 10 MG: 5 TABLET, FILM COATED ORAL at 08:18

## 2019-04-30 RX ADMIN — Medication 10 ML: at 16:49

## 2019-04-30 RX ADMIN — AMLODIPINE BESYLATE 5 MG: 5 TABLET ORAL at 08:18

## 2019-04-30 RX ADMIN — SIMETHICONE CHEW TAB 80 MG 80 MG: 80 TABLET ORAL at 18:13

## 2019-04-30 RX ADMIN — Medication 10 ML: at 08:18

## 2019-04-30 RX ADMIN — Medication 10 ML: at 19:42

## 2019-04-30 ASSESSMENT — ACTIVITIES OF DAILY LIVING (ADL): PREVIOUS_RESPONSIBILITIES: YARDWORK;MEDICATION MANAGEMENT;FINANCES;DRIVING

## 2019-04-30 NOTE — PLAN OF CARE
FOCUS/GOAL  Bowel management, Bladder management, Skin integrity and Cognition/Memory/Judgment/Problem solving    ASSESSMENT, INTERVENTIONS AND CONTINUING PLAN FOR GOAL:  Pt is alert and oriented x4, denies fever, chills, CP, SOB, N/V, or new weakness/numbness/tingling, reported pain in lower back, ice packs, aqua K pad and egg crate matris implemented with partial relief, tracy intact, anchor site changed and tracy cares performed, transferring with assist of 1 and ww, sleeping very little during the night due to anxiety and lower back pain by pt report, further abdominal pain reported in LLQ, bowel sounds intact, suppository administered at 5 am, pt was then walked on the floor for several min and then had 2 continent BMs, pain was then reported as relieved, when pt was asked if PMR should be contacted for further intervention pt declined, small dressing intact on right anterior groin, vs stable, no further care concerns at this time continue with POC.

## 2019-04-30 NOTE — PROGRESS NOTES
"   04/30/19 0641   Quick Adds   Type of Visit Initial Occupational Therapy Evaluation   Living Environment   Lives With spouse;child(farhad), adult   Living Arrangements house   Number of Stairs, Main Entrance 2   Transportation Anticipated car, drives self;family or friend will provide   Living Environment Comment 1STE, ranch house with stairs to basements. Pt does not need to go down to basement. Tub shower combo, No DME. Wife works pt is retired   Self-Care   Usual Activity Tolerance good   Current Activity Tolerance fair   Regular Exercise Yes   Equipment Currently Used at Home none   Activity/Exercise/Self-Care Comment Retired a year ago, putzes in the garage, yard work, walks to the gym, wife does cooking and cleaning   Functional Level   Ambulation 0-->independent   Transferring 0-->independent   Toileting 0-->independent   Bathing 0-->independent   Dressing 0-->independent   Eating 0-->independent   Communication 0-->understands/communicates without difficulty   Swallowing 0-->swallows foods/liquids without difficulty   Cognition 0 - no cognition issues reported   Fall history within last six months no   Prior Functional Level Comment IND an active, retired   General Information   Onset of Illness/Injury or Date of Surgery - Date 04/29/19   Referring Physician Dr mAirah Hurst   Patient/Family Goals Statement to be able ot take care of self   Additional Occupational Profile Info/Pertinent History of Current Problem Per chart \"PMH significant for HTN, GERD, DMT2, chronic back pain, urinary retention, and constipation who presented to [facility] ED on 4/22/19 with altered mental status and was found to have an acute parenchymal hemorrhage. The hemorrhage was found in the left parietal and occipital lobes, measuring ~4.7cm with 4mm rightward midline shift. Repeat CT and MRI after presentation showed stable hemorrhage and mass effect. CTA showed left occipital dural AVF. Patient underwent dAVF " "emobolization on 4/26. Keppra was initiated per neurology. NSGY consulted during acute admission, no surgical intervention required\"   Precautions/Limitations seizure precautions;fall precautions   Cognitive Status Examination   Orientation person;place   Level of Consciousness alert   Follows Commands (Cognition) increased processing time needed   Memory impaired   Organization/Problem Solving Problem solving impaired;Sequencing impaired   Executive Function Cognitive flexibility impaired;Planning ability impaired   Cognitive Comment Pt appears to have cognitive deficits. Pt slow to process, had difficulty following simple directions during muscle testing and struggled with sequencing routine. pt self talks and asks for reassurance that he is doing the correct thing often. Pt repotrs he has had some confusion. Will continue to assess   Visual Perception   Visual Perception Comments wears glasses and denies new deficits in vision. Pt had difficulty at itmes with navigation of room, could be cognition vs vision but will continue to assess   Sensory Examination   Sensory Comments mild proprioceptive deficit noted   Pain Assessment   Patient Currently in Pain Yes, see Vital Sign flowsheet  (reports low back pain and pain in hips)   Range of Motion (ROM)   ROM Comment BUE WFL   Strength   Strength Comments Hard to assess since pt was having back pain with muscle tesitng and difficulty following direction.    Hand Strength   Hand Strength Comments moderate    Coordination   Coordination Comments seems intact during ADL however need to continue to assess   ARC Assessment Only   Acute Rehab FIM See FIM scores for Mobility/ADL Assessment   Instrumental Activities of Daily Living (IADL)   Previous Responsibilities yardwork;medication management;finances;driving   Activities of Daily Living Analysis   Impairments Contributing to Impaired Activities of Daily Living balance impaired;cognition impaired;pain;sensory feedback " impaired;strength decreased  (endurance)   General Therapy Interventions   Planned Therapy Interventions ADL retraining;IADL retraining;neuromuscular re-education;strengthening;transfer training;progressive activity/exercise   Clinical Impression   Criteria for Skilled Therapeutic Interventions Met yes, treatment indicated   OT Diagnosis ADL and functional mobility deficits   Influenced by the following impairments cognition, strength, coordination, endurance, sensory feedback   Assessment of Occupational Performance 5 or more Performance Deficits   Identified Performance Deficits deficits impact all aspects of ADL and functional mobility iND   Clinical Decision Making (Complexity) Moderate complexity   Therapy Frequency daily   Predicted Duration of Therapy Intervention (days/wks) 7 days   Anticipated Equipment Needs at Discharge   (TBD)   Anticipated Discharge Disposition Home with Assist;Home with Outpatient Therapy   Risks and Benefits of Treatment have been explained. Yes   Patient, Family & other staff in agreement with plan of care Yes   Total Evaluation Time   Total Evaluation Time (Minutes) 15

## 2019-04-30 NOTE — PROGRESS NOTES
"  [facility]   Acute Rehabilitation Unit  Daily progress note    INTERVAL HISTORY  Apolinar Mcgregor was seen and examined at bedside.     Patient reports some low back pain. He notes he had this somewhat prior to hospitalization, but it was only occasionally and would be resolved by taking an advil. He reports that since hospitalization he has had increased back pain. Discussed this pain may be due to positioning, lack of mobility, etc. to which the patient responded with, \"that's exactly what I was thinking.\" Tylenol helps patient's pain. He declined flexiril yesterday, discussed medication with him this morning and he does not feel he needs this. Amenable to switching to PRN. Nursing noted patient had poor sleep due to anxiety and back pain, however, the patient reports he slept fairly well. The patient does note some abdominal pain, which he refers to as \"spasms\" of his intestines. This pain is intermittent and was much relieved this morning after BM x 2.     Patient continues to undergo evals from PT, OT, and SLP.         MEDICATIONS  Scheduled meds    amLODIPine  5 mg Oral Daily     artificial saliva  10 mL Swish & Spit 4x Daily     levETIRAcetam  500 mg Oral BID     lidocaine   Transdermal Q8H     metFORMIN  500 mg Oral Daily with supper     omeprazole  20 mg Oral QAM AC     tamsulosin  0.4 mg Oral Daily       PRN meds:  acetaminophen, bisacodyl, calcium carbonate, cyclobenzaprine, docusate sodium, Lidocaine, lidocaine      PHYSICAL EXAM  /88 (BP Location: Right arm)   Pulse 89   Temp 96.3  F (35.7  C) (Oral)   Resp 18   Ht 1.829 m (6')   Wt 93.9 kg (207 lb)   SpO2 98%   BMI 28.07 kg/m    Gen: NAD, sitting up in chair  HEENT: atraumatic, EOMI  Cardio: RRR, no murmurs  Pulm: CTAB, no respiratory distress  Abd: nondistended, soft, nontender, BS +  Ext: no BLE edema, moves all extremities spontaneously  Neuro/MSK: alert and oriented, some delay with sentence " formulation but otherwise speech conversationally comprehensible and appropriate. Full strength and ROM of BUE, BLE.     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 Functional impairments with balance, ADLs, cognition.  Medical comorbidities including HTN, GERD, DMT2, chronic back pain, urinary retention, and constipation. Appropriate for inpatient acute rehabilitation.       1. Impairment of ADL's: patient CGA to min A upon admission with ADLs, also at times requiring verbal cuing. Standing tolerance at least 2 minutes. Sit-stand min A. Goal is to improve strength and cognition in order to perform ADLs with set up assist or better in order to safely discharge home.     2. Impairment of mobility: patient able to ambulate 200-300ft with CGA and without assistive device. Biggest issues with mobility include balance and coordination. Scored 39/56 on PACHECO. Goal is to improve balance, coordination, strength, and endurance in order for patient to discharge home safely as mod ind or better and to navigate at least 2 stairs in order to enter his home.     3. Impairment of cognition/language/swallow: patient presenting with slowed cognitive processing speeds vs. Mild word finding difficulties of speech. Full cognitive-linguistic eval to be performed at ARU. Goal is for patient to improve speech and cognition in order to complete ADLs, understand safety, and participate in daily communication. No issues with swallow upon admission to rehab. Regular diet, thin liquids.     4. Rehab nursing: to assist and educate regarding environmental safety, medication management, self cares, bowel and bladder function, etc. Goal is for patient and / or family member to be responsible for these things at home.        Medical Conditions  1. Acute Parenchymal Hemorrhage due to dAVF: present in  occipital and parietal lobes, caused 4mm rightward midline shift. Patient is s/p emoblization on 4/26.   ? Goal SBP < 160  ? Keppra started per neurology, follow-up as outpatient  ? PT, OT, SLP for deficits   2. HTN: initially suspected as cause for hemorrhage, but controlled upon presentation. Continue PTA amlodipine 5mg. Monitor, goal as above.   3. GERD: continue omeprazole 20mg, tums PRN   4. DMT2: metformin held during acute stay, instructed to restart per discharging hospitalist team. Metformin 500mg XR daily.  5. Chronic Back Pain: tylenol PRN, flexiril TID - decreased to PRN 4/30, lidoderm patch PRN  6. Adjustment to disability:  Clinical psychology to eval and treat if needed   7. Bowel: constipated, last BM 2 days prior to admission. Patient requiring bowel meds for bowel movement at OSH. Had been seen by GI as outpatient regarding such issues. PRN bowel meds available, encourage use. Prune juice may be adjuvant option. Patient had BM x 2 on 4/30 with relief of abdominal pain.   8. Bladder: patient experienced some elevated PVRs at OSH, requiring consistent SIC, tracy placed over the weekend. Flomax started, will continue. Patient with some anxiety surrounding tracy removal, agreeable to removal and TOV on 5/1.  9. DVT Prophylaxis: SCDs at night, ambulating  10. GI Prophylaxis: PTA omperazole  11. Code: FULL  12. Anticipated disposition: home with family   13. ELOS:  5-7 days.  14. Rehab prognosis:  good  15. Follow up Appointments on Discharge: PCP, Neurology, PM&R     Amirah Hurst DO  PGY2 PM&R Resident

## 2019-04-30 NOTE — PHARMACY-MEDICATION REGIMEN REVIEW
Pharmacy Medication Regimen Review  Apolinar Mcgregor is a 58 year old male who is currently in the Acute Rehab Unit.    Assessment: All medications have an appropriate indications, durations and no unnecessary use was found    Plan:   No actions needed at this time.     Attending provider will be sent this note for review.  If there are any emergent issues noted above, pharmacist will contact provider directly by phone.      Pharmacy will periodically review the resident's medication regimen for any PRN medications not administered in > 72 hours and discontinue them. The pharmacist will discuss gradual dose reductions of psychopharmacologic medications with interdisciplinary team on a regular basis.    Please contact pharmacy if the above does not answer specific medication questions/concerns.  June Hodge, ManjeetD, [facility]  [facility]    Background:  A pharmacist has reviewed all medications and pertinent medical history today.  Medications were reviewed for appropriate use and any irregularities found are listed with recommendations.      Current Facility-Administered Medications:      acetaminophen (TYLENOL) tablet 650 mg, 650 mg, Oral, Q6H PRN, Amirah Hurst MD, 650 mg at 04/30/19 1301     amLODIPine (NORVASC) tablet 5 mg, 5 mg, Oral, Daily, Amirah Hurst MD, 5 mg at 04/30/19 0818     artificial saliva (BIOTENE DRY MOUTHWASH) liquid 10 mL, 10 mL, Swish & Spit, 4x Daily, Amirah Hurst MD, 10 mL at 04/30/19 1301     bisacodyl (DULCOLAX) Suppository 10 mg, 10 mg, Rectal, Daily PRN, Amirah Hurst MD, 10 mg at 04/30/19 0514     calcium carbonate (TUMS) chewable tablet 500-1,000 mg, 500-1,000 mg, Oral, TID PRN, Alberto Castelan MD, 1,000 mg at 04/29/19 1357     cyclobenzaprine (FLEXERIL) tablet 10 mg, 10 mg, Oral, TID PRN, Amirah Hurst MD     docusate sodium (COLACE) capsule 100 mg, 100 mg, Oral, BID PRN, Amirah Hurst MD, 100 mg at 04/30/19 0514     levETIRAcetam (KEPPRA) tablet 500 mg,  500 mg, Oral, BID, Amirah Hurst MD, 500 mg at 04/30/19 0818     Lidocaine (LIDOCARE) 4 % Patch 1 patch, 1 patch, Transdermal, Q24H PRN, Amirah Hurst MD     lidocaine patch in PLACE, , Transdermal, Q8H, Alberto Castelan MD     lidocaine patch REMOVAL, , Transdermal, Q24H PRN, Alberto Castelan MD     metFORMIN (GLUCOPHAGE-XR) 24 hr tablet 500 mg, 500 mg, Oral, Daily with supper, Amirah Hurst MD, 500 mg at 04/29/19 1727     omeprazole (priLOSEC) CR capsule 20 mg, 20 mg, Oral, QAM AC, Amirah Hurst MD, 20 mg at 04/30/19 0600     tamsulosin (FLOMAX) capsule 0.4 mg, 0.4 mg, Oral, Daily, Amirah Hurst MD, 0.4 mg at 04/30/19 0818  No current outpatient prescriptions on file.   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

## 2019-04-30 NOTE — PLAN OF CARE
Patient administered cog-linguistic evaluation. Patient presents with mild-moderate deficits in memory and processing speed. Patient calm and cooperative and may require additional, slower repetitions of verbal instructions.

## 2019-04-30 NOTE — PROGRESS NOTES
"CLINICAL NUTRITION SERVICES - ASSESSMENT NOTE     Nutrition Prescription    RECOMMENDATIONS FOR MDs/PROVIDERS TO ORDER:  -None today    Malnutrition Status:    -Unable to determine due to incomplete nutrition status validation.      Recommendations already ordered by Registered Dietitian (RD):  -Ordered Boost Glucose Control Shake chocolate with dinner tonight for a trial.      Future/Additional Recommendations:  -Encourage intakes of adequate fluids and tid meals + supplement.    -Monitor po intakes and weight trends.  Follow up for supplement acceptance and adjust as indicated.       REASON FOR ASSESSMENT  Apolinar Mcgregor is a/an 58 year old male assessed by the dietitian for Admission Nutrition Risk Screen for reduced oral intake over the last month and RN Consult - \"Patient has reduced oral intake over the last couple weeks.\"    NUTRITION HISTORY  Per chart review, during hospitalization, pt was intermittently NPO/NDD2 with Thin Liquids, then diet advanced to Moderate Consistent Carbohydrate by discharge.  Per SLP note ():  \"Patient discharged on a regular diet and thin liquids with precautions.\"    PO intakes were 100% of recorded meals per nursing flow sheets.  Unable to discuss po intakes with pt today or review meal records from hospitalization.      CURRENT NUTRITION ORDERS  Diet: Regular/Thin Liquids  Room Service Appropriate    Intake/Tolerance: Pt just admitted yesterday and so far has only ordered dinner last night and breakfast this am.  \"I have cramps-of course, I am not eating.\"  Per pt cramps started a couple of days ago, but before this was eating ok.  \"I know I haven't ordered lunch.\"  He did agree to try a Boost ice cream shake-chocolate.      LABS  Labs reviewed  ()  B-129  ()  Hgb A1C:  6.3%    MEDICATIONS  Medications reviewed  Metformin  Biotene    ANTHROPOMETRICS  Height: 182.9 cm (6' 0\")  Most Recent Weight (): 93.9 kg (207 lb)    IBW: 178 lbs  BMI: Overweight BMI " 25-29.9  Weight History:  Per chart review, hospital admission weight (4/22) 96.1 kg/211 lbs 13.8 oz.  Weight trended up over LOS, but last prior to discharge (4/28) 95.2 kg/209 lbs 14.4 oz.  Based on [facility] admission weight appears decreased 5 lbs or 2% x 1 week.  Unable to obtain weight hx today.  Wt Readings from Last 10 Encounters:   04/29/19 93.9 kg (207 lb)   04/28/19 95.2 kg (209 lb 14.4 oz)   07/07/14 98.6 kg (217 lb 6.4 oz)   10/28/13 95.1 kg (209 lb 9.6 oz)   07/01/13 89 kg (196 lb 1.6 oz)   06/12/13 94.4 kg (208 lb 1.8 oz)   04/30/13 92.5 kg (204 lb)   04/29/13 92.8 kg (204 lb 9.6 oz)   03/11/13 97.2 kg (214 lb 4.6 oz)     Dosing Weight: 94 kg ([facility] admission weight)    ASSESSED NUTRITION NEEDS  Estimated Energy Needs: 5836-6075 kcals/day (25 - 30 kcals/kg)  Justification: Maintenance  Estimated Protein Needs:  grams protein/day (1 - 1.2 grams of pro/kg)  Justification: Maintenance  Estimated Fluid Needs:  (1 mL/kcal)   Justification: Per provider pending fluid status    PHYSICAL FINDINGS  See malnutrition section below.  Per chart review:  Poor dentition  Dry mouth  Constipation    MALNUTRITION  % Intake: Unable to assess  % Weight Loss: Up to 1-2% in 1 week (non-severe)  Subcutaneous Fat Loss: Unable to assess  Muscle Loss: Unable to assess  Fluid Accumulation/Edema: None noted  Malnutrition Diagnosis: Unable to determine due to incomplete nutrition status validation-unable to obtain nutrition hx or NFPA due to pt condition.      NUTRITION DIAGNOSIS  Inadequate oral intakes related to decreased appetite AEB pt report and RN consult for reduced oral intake over the last couple of weeks.      INTERVENTIONS  Implementation  Nutrition Education: Briefly discussed supplement options.   Ordered Boost Glucose Control shake chocolate with dinner for a trial.      Discussed pt report of cramps with RN.      Goals  Patient to consume % of nutritionally adequate meal trays TID, or the equivalent with  supplements/snacks.     Monitoring/Evaluation  Progress toward goals will be monitored and evaluated per protocol.    Tamiko López RD, LD

## 2019-04-30 NOTE — PLAN OF CARE
PT: gamaliel completed today. Pt progressed to CGA with no AD x200', CGA sit<>stand to no AD, SBA bed mobility. Per team report, back pain causing great discomfort to pt leading to slight agitation. Time spent during session addressing this to allow improved participation in therapy with improved relief. Pt okay to amb with family with FWW in pickard with SBA (wife present and provided training) or CGA no AD with nursing. ELOS 7 days to return to IND household mobility without need for AD. Anticipate OP PT for higher level balance, strength, activity tolerance to return to community mobility.

## 2019-04-30 NOTE — PLAN OF CARE
Problem: Goal/Outcome  Goal: Goal Outcome Summary  Outcome: No Change  Note:   FOCUS/GOAL  Bowel management, Bladder management, Medical management and Mobility    ASSESSMENT, INTERVENTIONS AND CONTINUING PLAN FOR GOAL:  Pt's vitals stable. Denied any pain this morning but then c/o low back pain at 1pm. Prn tylenol given with good effect. Aqua K pad also applied. Pt transferred with walker and CGA. He requested to be walked with NA in between therapies. C/o cramping. When offered prn flexeril or lidocaine, he said the cramping is more on the abdomen/bowel cramps. Will offer more bowel meds after therapies.

## 2019-04-30 NOTE — PLAN OF CARE
OT: OT gamaliel completed. Pt is MIN/MOD A for LB cares and set up for UB cares. Pt scored 19/30 on the MOCA indicating below normal cognition. Pt perseverating on cramps in abdomen and hip pain, Nurse aware. During session pt was anxious and in PM agitated but cooperative. Seen ambulating in the hallway mutliple times with staff outside of therapy times. Need to progress pt to MOD I when appropriate since wife works and needs to see how cognition impacts ADL and safety. Currently pt requires assist and cues for problem solving and sequencing at times. DME is TBD. ELOS is 7 days with OP follow up and assist with IADL.

## 2019-04-30 NOTE — H&P
Post Admission Physician Evaluation:    I have compared Al's condition on admission to acute rehabilitation to that outlined in the preadmission screen. History and physical exam performed by me. 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    Comorbid medical conditions being managed: Hypertension, diabetes, gastroesophageal reflux    Prior functional level: Independent with mobility ADLs and cognition    Present function: Definite balance deficit and impaired coordination. Pradhan 39/56.  Not so much weakness but does require assistance for mobility though once up was able to go upwards to 300 feet.  May have some proprioceptive integration deficits as well to support further.  Some mild cognitive processing slowing is noted and possibly mild word finding difficulties.    Anticipated rehabilitation course:   Will benefit from intensive rehabilitation includin minutes each of PT, OT and SLP  Rehabilitation nursing  Close management by physiatry    Prognosis: Overall deficits from the stroke are on the milder side and I am optimistic he will has some good ongoing functional improvements and be able to discharge home with some ongoing likely outpatient physical and occupational therapies, possibly speech therapy as well.     Estimated length of stay: 5-7 days.

## 2019-04-30 NOTE — PROGRESS NOTES
04/30/19 1300   General Information, SLP   Type of Evaluation Cognitive-Linguistic   Type of Visit Initial   Start of Care Date 04/30/19   Onset of Illness/Injury or Date of Surgery - Date 04/26/19   Referring Physician Dr. Hurst   Patient/Family Goals Statement To retrieve memory.   Pertinent History of Current Problem Patient self-reports that cognitive function is below baseline.   Precautions/Limitations fall precautions   General Info Comments Patient was seen by speech therapy during acute care hospitalization for dysphagia management but goals were met. Therapists there recommended completion of cognitive linguistic evaluation.    Language: Auditory Comprehension (understanding of spoken language)   Two Step Commands (out of 5 total) 5   Paragraph; Discourse Comprehension Test (out of 8 total; less than 7 is below mean) 6  (below mean; patient demonstrates diff with recall.)   Functional Assessment Scale (Auditory Comprehension) Minimal Impairment   Comments (Auditory Comprehension) Patient able to follow verbal directions, however memory deficits present a barrier to retaining new information presented verbally.   Language: Verbal Expression (use of spoken language to express information)   Responsive Naming; [place] Diagnostic Aphasia Exam 3 (out of 20 total) 20   Generative Naming Score; Cognitive Linguistic Quick Test 3   Generative Naming; Cognitive Linguistic Quick Test Result Below mean   Functional Assessment Scale (Verbal Expression) Mild Impairment   Comments (Verbal Expression) Patient demonstrates mild impairment in word finding during conversational speech, moderate impairment in generative naming task. Patient able to communicate thoughts, wants, and needs.   Reading Comprehension (understanding of written language)   Sentences and Paragraphs; Palmer Diagnostic Aphasia Exam (out of 10 total) 8   Functional Assessment Scale (Reading Comprehension) Mild Impairment   Comments (Reading  Comprehension) Patient required additional processing time and read sentences aloud to aid in focus and comprehension.   Written Expression (use of writing to express information)   Generate Sentences; [place] Test for Differential Diagnosis Of Aphasia (out of 6 total) 6   Functional Assessment Scale (Written Expression) No Impairment   Comments (Written Expression) Patient able to express wants and needs through writing.    Cognitive Status Examination   Attention impaired  (difficulty concentrating on attention/needs more time)   Behavioral Observations distractible;alert   Orientation WNL   Visual Impairments Include visual acuity  (wears glasses)   Short Term Memory impaired   Long Term Memory impaired  (patient reports impairment)   Reasoning intact   Executive Function Deficits Noted other (see comments)  (needs ample processing time)   Additional cognitive-linguistic evaluation indicated  recommend;Carito-Ramon   Standardized cognitive-linguistic assessment completed to be completed during future session   Cognitive Status Exam Comments Patient presents with a mild-moderate cognitive impairment characterized by deficits in memory and processing speed. Patient's current level of performance is below baseline.   General Therapy Interventions   Planned Therapy Interventions Cognitive Treatment   Cognitive treatment Internal memory strategy training;External memory strategy training   Intervention Comments Recommend therapy focusing on attention (divided), memory (STM/LTM), and executive functioning(processing speed).   Clinical Impression, SLP Eval   Criteria for Skilled Therapeutic Interventions Met Yes   SLP Diagnosis Mild-moderate cognitive deficit secondary to stroke.   Influenced by the following factors/impairments Deficts in memory and executive functioning.   Functional limitations due to impairments Decreased level of independence in IADL tasks   Rehab Potential Good, to achieve stated therapy  goals   Rehab potential affected by age, health status   Therapy Frequency Daily   Predicted Duration of Therapy Intervention (days/wks) 2 wks   Anticipated Discharge Disposition Home with Outpatient Therapy   Risks and Benefits of Treatment have been explained. Yes   Patient, Family & other staff in agreement with plan of care Yes   Clinical Impression Comments Patient presents with a mild-moderate cognitive impairment characterized by deficits in memory and slower processing speed. Current level of function is below baseline. Patient would benefit from cognitive therapy focusing on memory and executive functioning to improve functional independence outcomes.   Total Evaluation Time      Total Evaluation Time (Minutes) 60

## 2019-05-01 LAB
GLUCOSE BLDC GLUCOMTR-MCNC: 119 MG/DL (ref 70–99)
GLUCOSE BLDC GLUCOMTR-MCNC: 120 MG/DL (ref 70–99)
GLUCOSE BLDC GLUCOMTR-MCNC: 144 MG/DL (ref 70–99)
GLUCOSE BLDC GLUCOMTR-MCNC: 146 MG/DL (ref 70–99)

## 2019-05-01 PROCEDURE — 25000132 ZZH RX MED GY IP 250 OP 250 PS 637: Performed by: STUDENT IN AN ORGANIZED HEALTH CARE EDUCATION/TRAINING PROGRAM

## 2019-05-01 PROCEDURE — 97530 THERAPEUTIC ACTIVITIES: CPT | Mod: GP | Performed by: STUDENT IN AN ORGANIZED HEALTH CARE EDUCATION/TRAINING PROGRAM

## 2019-05-01 PROCEDURE — 97116 GAIT TRAINING THERAPY: CPT | Mod: GP | Performed by: STUDENT IN AN ORGANIZED HEALTH CARE EDUCATION/TRAINING PROGRAM

## 2019-05-01 PROCEDURE — 97535 SELF CARE MNGMENT TRAINING: CPT | Mod: GO

## 2019-05-01 PROCEDURE — 97127 ZZHC SP THERAPEUTIC INTERVENTION W/FOCUS ON COGNITIVE FUNCTION,EA 15 MIN: CPT | Mod: GN | Performed by: SPEECH-LANGUAGE PATHOLOGIST

## 2019-05-01 PROCEDURE — 97110 THERAPEUTIC EXERCISES: CPT | Mod: GP | Performed by: STUDENT IN AN ORGANIZED HEALTH CARE EDUCATION/TRAINING PROGRAM

## 2019-05-01 PROCEDURE — 00000146 ZZHCL STATISTIC GLUCOSE BY METER IP

## 2019-05-01 PROCEDURE — 12800006 ZZH R&B REHAB

## 2019-05-01 RX ORDER — POLYETHYLENE GLYCOL 3350 17 G/17G
17 POWDER, FOR SOLUTION ORAL DAILY PRN
Status: DISCONTINUED | OUTPATIENT
Start: 2019-05-01 | End: 2019-05-05 | Stop reason: HOSPADM

## 2019-05-01 RX ADMIN — CYCLOBENZAPRINE HYDROCHLORIDE 10 MG: 5 TABLET, FILM COATED ORAL at 01:10

## 2019-05-01 RX ADMIN — ACETAMINOPHEN 650 MG: 325 TABLET, FILM COATED ORAL at 16:29

## 2019-05-01 RX ADMIN — ACETAMINOPHEN 650 MG: 325 TABLET, FILM COATED ORAL at 01:10

## 2019-05-01 RX ADMIN — ACETAMINOPHEN 650 MG: 325 TABLET, FILM COATED ORAL at 09:04

## 2019-05-01 RX ADMIN — CYCLOBENZAPRINE HYDROCHLORIDE 10 MG: 5 TABLET, FILM COATED ORAL at 09:04

## 2019-05-01 RX ADMIN — Medication 10 ML: at 13:20

## 2019-05-01 RX ADMIN — LEVETIRACETAM 500 MG: 500 TABLET, FILM COATED ORAL at 21:53

## 2019-05-01 RX ADMIN — AMLODIPINE BESYLATE 5 MG: 5 TABLET ORAL at 09:04

## 2019-05-01 RX ADMIN — DOCUSATE SODIUM 100 MG: 100 CAPSULE, LIQUID FILLED ORAL at 06:59

## 2019-05-01 RX ADMIN — METFORMIN HYDROCHLORIDE 500 MG: 500 TABLET, EXTENDED RELEASE ORAL at 16:29

## 2019-05-01 RX ADMIN — OMEPRAZOLE 20 MG: 20 CAPSULE, DELAYED RELEASE ORAL at 06:47

## 2019-05-01 RX ADMIN — Medication 10 ML: at 21:53

## 2019-05-01 RX ADMIN — Medication 10 ML: at 16:29

## 2019-05-01 RX ADMIN — LEVETIRACETAM 500 MG: 500 TABLET, FILM COATED ORAL at 09:04

## 2019-05-01 RX ADMIN — ACETAMINOPHEN 650 MG: 325 TABLET, FILM COATED ORAL at 21:53

## 2019-05-01 RX ADMIN — Medication 10 ML: at 09:06

## 2019-05-01 RX ADMIN — TAMSULOSIN HYDROCHLORIDE 0.4 MG: 0.4 CAPSULE ORAL at 09:04

## 2019-05-01 RX ADMIN — LIDOCAINE 1 PATCH: 560 PATCH PERCUTANEOUS; TOPICAL; TRANSDERMAL at 13:21

## 2019-05-01 NOTE — PLAN OF CARE
Discharge Planner OT   Patient plan for discharge: home  Current status: Pt completed full shower and ADL.  Pt able to wash all body parts seated on tub bench. Pt min A for LB drying at times 2/2 hip pain, however pt inconsistent with this as sometimes able to complete figure 4 and sometimes c/o pain.  Pt needs A for catheter management, otherwise set-up for dressing.  Pt ambulating with CGA to SBA with gait belt, no AD.  Barriers to return to prior living situation: functional cognition/safety awareness, activity tolerance, hip pain  Recommendations for discharge: home with A, OP OT  Rationale for recommendations: pt moving well, may need assist/supervision for safety, OP OT follow up for higher level cognition       Entered by: Christel Mejia 05/01/2019 12:41 PM

## 2019-05-01 NOTE — PROGRESS NOTES
05/01/19 1632   Signing Clinician's Name / Credentials   Signing clinician's name / credentials [facility]   Quick Adds   Rehab Discipline SLP   Additional Documentation   SLP Plan SLP: continue with moderate level tasks for memory reasoning, note WJ-R scores, moderate cognitive deficits. planning for d/c home possibly this weekend   Total Session Time   Total Session Time (minutes) 40 minutes   SLP - Acute Rehab Center Time   Individual Time (minutes) - enter zero if not applicable - SLP 40   Group Time (minutes) - enter zero if not applicable  - SLP 0   Concurrent Time (minutes) - enter zero if not applicable  - SLP 0   Co-Treatment Time (minutes) - enter zero if not applicable  - SLP 0   ARC Total Session Time (minutes) - SLP 40   Comprehension (FIM)   Functional Performance Mild difficulty comprehending complex/abstract ideas   Comprehension Comment SLP:needed repetition,clarification of directions in task below   Expression (FIM)   Functional Performance Complete independence expressing complex/abstract ideas   Problem Solving (FIM)   Functional Performance Needs increased time to make decisions and solve complex problems   Problem Solving Comment  WJ-R test of analysis synthesis pt scored 18th percentile   Memory (FIM)   Functional Performance Moderate prompting-recognizes and remembers 50-74% of the time

## 2019-05-01 NOTE — PLAN OF CARE
PT: Amb 400+ ft w/o AD SBA, slow and steady pace w/ increase toe out and decreased step length B. Flight of steps performed w/ single railing and step to pattern CGA. Pt assessed in room mobility, and upgraded to IND in room once dressed during the day and board updated to allow pt to amb outside of room with family/friends w/o AD.

## 2019-05-01 NOTE — PLAN OF CARE
FOCUS/GOAL  Bowel management, Bladder management, Pain management, Medical management and Mobility    ASSESSMENT, INTERVENTIONS AND CONTINUING PLAN FOR GOAL:  Patient is A&O x4. Makes needs known. Assist of 1 w/ gait belt. Hinson is patent. Complained of cramps, received Simethicone x1 PRN with little improvement. No BM during shift but did have 2 earlier in the day. Complained of back stiffness which was relieved w/ heating pack x1 and Aqua-K pad. BG was 117. Continue with plan of care.

## 2019-05-01 NOTE — PROGRESS NOTES
Social Work: Initial Assessment with Discharge Plan    Patient Name: Apolinar Mcgregor  : 1960  Age: 58 year old  MRN: [id number]  Completed assessment with: Pt  Admitted to ARU: 2019    Presenting Information   Date of SW assessment: May 1, 2019  Health Care Directive: Provided education  Primary Health Care Agent: Self  Secondary Health Care Agent: Pt's wife would be NOK per policy   Living Situation: Pt lives with his wife in [place].   Previous Functional Status: Independent   DME available: See therapy notes   Patient and family understanding of hospitalization: Pt stated that he was sent here to get stronger.  Cultural/Language/Spiritual Considerations: English speaking.    BIMS: Completed in flow-sheet.    Physical Health  Reason for admission: Intraparenchymal hemorrhage of brain (H)    Provider Information   Primary Care Physician:Luis Fernando Rodriguez   : HA[facility]EL    Mental Health/Chemical Dependency:   Diagnosis: No concerns   Alcohol/Tobacco/Narcotis: No concerns   Support/Services in Place: NA  Services Needed/Recommended: NA  Sexuality/Intimacy: Pt did not want to discuss.     Support System  Marital Status:    Family support: Pt has the support of his wife Lorraine.   Other support available: Pt has other family (DTR's) that are supportive.     Community Resources  Current in home services: None   Previous services: NA    Financial/Employment/Education  Employment Status: Retired- [employer].   Income Source: [employer]  Education: Unknown   Financial Concerns:  No concerns presented   Insurance: [employer].       Discharge Plan   Patient and family discharge goal: Pt's goal is to return home with his family.   Provided Education on discharge plan: YES  Patient agreeable to discharge plan:  YES  Provided education and attained signature for Medicare IM and IRF Patient Rights and Privacy Information provided to patient : Privacy rights given. NA to Medicare letter.    Provided patient with [place] Brain Injury [place] Resources: YES  Barriers to discharge: Medical clearance    Discharge Recommendations   Disposition: TBD, but goal is to return home.   Transportation Needs: Family to provide  Name of Transportation Company and Phone: NA    Additional comments   Pt is a 58 year old male, admitted to ARU following Intraparenchymal hemorrhage of brain (H). Pt states that he has good support and is wanting to return home asap. Team working towards a safe discharge plan.     Please invite to Care Conference:  Lorraine ([phone])      SHANNA Sharma  Covering Acute Rehab   Phone: [phone]  Pager: [phone]         05/01/19 0800   Living Arrangements   Lives With spouse   Living Arrangements house   Able to Return to Prior Arrangements yes   Home Safety   Patient Feels Safe Living in Home? yes   Discharge Planning   Expected Discharge Date 05/05/19   Patient/Family Anticipates Transition to home with family;home with help/services   Discharge Needs Assessment   Transportation Anticipated family or friend will provide

## 2019-05-01 NOTE — PROGRESS NOTES
[id number]:     Pt feeling well this afternoon. Slight back pain coming back after therapy; Tylenol given with relief. Tracy patent and draining. Plan to remove tracy tomorrow morning and monitor PVRs. Pt has been advanced to be independent in his room today (during the day); plan to ensure belongings are within reach and he has a clutter-free environment for safety.

## 2019-05-01 NOTE — PROGRESS NOTES
05/01/19 1011   Signing Clinician's Name / Credentials   Signing clinician's name / credentials [facility]   Quick Adds   Rehab Discipline SLP   Additional Documentation   SLP Plan SLP: can try WJ-R analysis synthesis, other tasks for memory, reasoning   Total Session Time   Total Session Time (minutes) 30 minutes  (cognitive)   SLP - Acute Rehab Center Time   Individual Time (minutes) - enter zero if not applicable - SLP 30   Group Time (minutes) - enter zero if not applicable  - SLP 0   Concurrent Time (minutes) - enter zero if not applicable  - SLP 0   Co-Treatment Time (minutes) - enter zero if not applicable  - SLP 0   ARC Total Session Time (minutes) - SLP 30   Comprehension (FIM)   Functional Performance Complete independence comprehending complex/abstract ideas   Expression (FIM)   Functional Performance Complete independence expressing complex/abstract ideas   Problem Solving (FIM)   Functional Performance Needs increased time to make decisions and solve complex problems   Problem Solving Comment SLP: WJ-R test of verbal [id number] percentile   Memory (FIM)   Functional Performance Minimal prompting-recognizes and remembers 75-90% of the time   Memory Comment SLP: WJ-R test of audiotory visual learning 3rd percentile

## 2019-05-01 NOTE — PLAN OF CARE
Problem: Goal/Outcome  Goal: Goal Outcome Summary  Outcome: No Change  Note:   FOCUS/GOAL  Bowel management, Bladder management, Pain management and Medical management    ASSESSMENT, INTERVENTIONS AND CONTINUING PLAN FOR GOAL:  Pt's vitals and BGs stable. Pt incontinent of bm at start of shift. He said he had been waiting but wasn't able to hold it. Dependent on staff for inc cares. He had a large bm. Expressed that he didn't have any abdominal cramping after he had the bm. Declined offer of prn simethicone. Hinson cares done. Prn tylenol and prn flexeril given for back pain with good relief this morning However, c/o again of low back pain after lunch. Prn lidocaine patch applied to right lower back with some effect.

## 2019-05-01 NOTE — PROGRESS NOTES
PM&R Daily Note:    Participating well in therapies.  Advance to up independent in room today during the day but still assist at night and with dressing.    Trial of void, Remove tracy 5/2 Thursday AM after waking up.  Had had urine retention so monitor bladder volumes close. Goal to keep bladder volumes under 500 mL. Monitor PVR's till 3 less than 100 mL. Intermittent catheterize if PVR > 300 mL.  Continues with tamsulosin    abd cramping still some - suspect still linked with obstipation. Have senna and prn miralax.     Team rounds tomorrow to review progress. Hope to get home by Sunday 5/5.      Vitals:    05/01/19 0823 05/01/19 0900 05/01/19 1518 05/01/19 1637   BP: 120/79 110/70 125/79 122/79   BP Location: Right arm Left arm Left arm Right arm   Pulse: 79 80 95 85   Resp: 16   16   Temp: 96.3  F (35.7  C)   97.6  F (36.4  C)   TempSrc: Oral   Oral   SpO2: 96%   96%   Weight:       Height:         Alert, conversant  Bright affect  Heart RRR  Lungs clear  Slight abdominal discomfort on RLQ with deeper palpation normal BS's.  No peripheral edema   Strength has remained strong bilaterally.  Indwelling catheter in place with slight concentrated yellow urine.        amLODIPine  5 mg Oral Daily     artificial saliva  10 mL Swish & Spit 4x Daily     levETIRAcetam  500 mg Oral BID     lidocaine   Transdermal Q8H     metFORMIN  500 mg Oral Daily with supper     omeprazole  20 mg Oral QAM AC     tamsulosin  0.4 mg Oral Daily

## 2019-05-01 NOTE — PLAN OF CARE
FOCUS/GOAL  Bowel management, Bladder management, Medical management and Mobility    ASSESSMENT, INTERVENTIONS AND CONTINUING PLAN FOR GOAL:      Patient is A&Ox4 and continent of bowel. No BM but patient feels the urge. PRN Colace given this AM. Sticky note sent to dr for PRN Miralax. Hinson patent and draining rika urine. Encouraged patient to increase fluids. Patient c/o back pain, PRN Tylenol and Flexeril given which provided relief and sleep. Patient set off alarm 2x during the night. Writer reminded patient to call for assistance instead of getting up on his own. Patient transfers Ax1 with no assistive device. Continue POC.

## 2019-05-02 LAB
GLUCOSE BLDC GLUCOMTR-MCNC: 106 MG/DL (ref 70–99)
GLUCOSE BLDC GLUCOMTR-MCNC: 111 MG/DL (ref 70–99)

## 2019-05-02 PROCEDURE — 25000132 ZZH RX MED GY IP 250 OP 250 PS 637: Performed by: STUDENT IN AN ORGANIZED HEALTH CARE EDUCATION/TRAINING PROGRAM

## 2019-05-02 PROCEDURE — 12800006 ZZH R&B REHAB

## 2019-05-02 PROCEDURE — 97127 ZZHC SP THERAPEUTIC INTERVENTION W/FOCUS ON COGNITIVE FUNCTION,EA 15 MIN: CPT | Mod: GN | Performed by: SPEECH-LANGUAGE PATHOLOGIST

## 2019-05-02 PROCEDURE — 97535 SELF CARE MNGMENT TRAINING: CPT | Mod: GO | Performed by: STUDENT IN AN ORGANIZED HEALTH CARE EDUCATION/TRAINING PROGRAM

## 2019-05-02 PROCEDURE — 97535 SELF CARE MNGMENT TRAINING: CPT | Mod: GO | Performed by: OCCUPATIONAL THERAPIST

## 2019-05-02 PROCEDURE — 97750 PHYSICAL PERFORMANCE TEST: CPT | Mod: GP | Performed by: STUDENT IN AN ORGANIZED HEALTH CARE EDUCATION/TRAINING PROGRAM

## 2019-05-02 PROCEDURE — 00000146 ZZHCL STATISTIC GLUCOSE BY METER IP

## 2019-05-02 PROCEDURE — 97530 THERAPEUTIC ACTIVITIES: CPT | Mod: GP | Performed by: STUDENT IN AN ORGANIZED HEALTH CARE EDUCATION/TRAINING PROGRAM

## 2019-05-02 RX ADMIN — Medication 10 ML: at 08:05

## 2019-05-02 RX ADMIN — AMLODIPINE BESYLATE 5 MG: 5 TABLET ORAL at 08:01

## 2019-05-02 RX ADMIN — LEVETIRACETAM 500 MG: 500 TABLET, FILM COATED ORAL at 20:17

## 2019-05-02 RX ADMIN — ACETAMINOPHEN 650 MG: 325 TABLET, FILM COATED ORAL at 18:49

## 2019-05-02 RX ADMIN — TAMSULOSIN HYDROCHLORIDE 0.4 MG: 0.4 CAPSULE ORAL at 08:01

## 2019-05-02 RX ADMIN — Medication 10 ML: at 15:48

## 2019-05-02 RX ADMIN — Medication 10 ML: at 12:28

## 2019-05-02 RX ADMIN — CYCLOBENZAPRINE HYDROCHLORIDE 10 MG: 5 TABLET, FILM COATED ORAL at 05:33

## 2019-05-02 RX ADMIN — OMEPRAZOLE 20 MG: 20 CAPSULE, DELAYED RELEASE ORAL at 05:33

## 2019-05-02 RX ADMIN — ACETAMINOPHEN 650 MG: 325 TABLET, FILM COATED ORAL at 05:33

## 2019-05-02 RX ADMIN — ACETAMINOPHEN 650 MG: 325 TABLET, FILM COATED ORAL at 12:24

## 2019-05-02 RX ADMIN — Medication 10 ML: at 20:17

## 2019-05-02 RX ADMIN — CYCLOBENZAPRINE HYDROCHLORIDE 10 MG: 5 TABLET, FILM COATED ORAL at 17:33

## 2019-05-02 RX ADMIN — METFORMIN HYDROCHLORIDE 500 MG: 500 TABLET, EXTENDED RELEASE ORAL at 17:15

## 2019-05-02 RX ADMIN — LEVETIRACETAM 500 MG: 500 TABLET, FILM COATED ORAL at 08:01

## 2019-05-02 NOTE — PROGRESS NOTES
FOCUS/GOAL  Bladder management, Pain management and Mobility    ASSESSMENT, INTERVENTIONS AND CONTINUING PLAN FOR GOAL:  Patient is alert and oriented x 4 and is forgetful.  Patient's tracy was removed this AM and patient is voiding numerous small amounts spontaneously.  PVR's being monitored (patient is calling after each void) and in Intake/Output flowsheet, all greater than 100. PRN Tylenol given for back and left hip pain.  Results: partially effective.  Aqua K pad in use for comfort.  Team rounds completed, see separate rounds note.  Patient will need medication oversight and does not need MAP.  Blood glucose 111 this AM.  Was cleared for independent on unit.

## 2019-05-02 NOTE — PLAN OF CARE
OT: Therapist talked to pt and wife. Educated pwife on recommendation of 24/7 for at least a week and for assist with IADL such as cooking/meal prep, meds, finances and no driving. Wife agreeable and sister will be coming on Thursday as well. Set up family training for Sat afternoon for therapies.

## 2019-05-02 NOTE — DISCHARGE INSTRUCTIONS
"Follow up appointments (family will schedule)    -- Primary provider, Dr. Rodriguez, within 2-3 weeks.    Address  [address]  Phone   [phone]    -- Neurology as scheduled with Dr. Thompson on Tuesday, May 28th at 10:30 am.     -- Urology with Justin Wynn, PA-C. [facility]  [address] [place] [address]    [phone] (Fax)    -- Endocrinology to follow up with diabetes - Dr. Rodriguez can help coordinate.     To reduce the risk of subsequent stroke there are several important factors including optimal management of anticoagulants, blood pressure, cholesterol, diabetes and smoking abstinence.    Anticoagulation:  You are not on anticoagulation as the type of stroke you had was hemorrhagic (bleeding)    Blood Pressure:  Keeping your blood pressures less than 130/80 has been shown to reduce risk of recurrent stroke. Recording your blood pressure and heart rate once daily in a log book can help you and your providers make decisions on optimal management. You are encouraged to bring your log book with you to your primary physician and/or cardiology doctor visits.    You are currently on amlodipine to help control your blood pressure. Several lifestyle modifications have been associated with blood pressure reduction and are an important part of a comprehensive plan. These include: weight loss (if over-weight); a diet low in salt and cholesterol and rich in fruits and vegetables; regular aerobic physical activity and limited alcohol consumption.    Diabetes:  Optimal management of diabetes not only reduces risk of another stroke, it can help with healing process from your recent stroke. Blood glucose levels measure how well you're controlling your diabetes. An additional test \"hemoglobin A1C\" reflects how you have been overall with glucose control in the prior few months. This should be less than 7 though even lower below 6 is considered normal. Your recent Hgb A1C was " "[insert A1C]. This should be followed up with your primary provider and/or endocrinologist.    Your present plan is to check blood glucose consistently Twice Daily. These should be recorded along with date/time and any relevant notes such as food consumed, insulin/medication taken etc. This helps you and your providers make decisions on optimal management. You're encouraged to bring you log book with to your primary and/or endocrinology doctor visits.  Your current medication specific doses and frequencies listed elsewhere.     Diet:  Diet and exercise are very important for diabetes regardless of being on medication or not. Be aware of and moderate your carbohydrate intake as instructed by doctor, dietitian or nurse.  Regular physical activity may be more difficult since your stroke though doing what you can on a daily basis is helpful.     Cholesterol:  Traditional target levels for LDL cholesterol or \"bad cholesterol\" is less than 130 however once you have had a stroke, your target LDL level is now less than 100. Additional recommendations such as increasing your HDL or \"good\" cholesterol and lowering your triglyceride level can also be important.    Your most recent lipid panel was   Lab Results   Component Value Date    CHOL 116 04/24/2019     Lab Results   Component Value Date    HDL 49 04/24/2019     Lab Results   Component Value Date    LDL 50 04/24/2019     Lab Results   Component Value Date    TRIG 84 04/24/2019     No results found for: CHOLHDLRATIO    This should be followed up in 2-3 months with your primary provider.    Smoking:  Finally one of the most important modifiable risk factors is to not smoke. This includes cigarettes, pipes, cigars, chewing tobacco and second hand smoke. Support through counseling, nicotine replacement, and oral smoking-cessation medications may all be helpful. Often people have been able to quit during their hospitalization but once returning to their familiar environment, " the urges can be stronger. If this is the case, we encourage you to get support. There are numerous options, start by talking with your doctor.

## 2019-05-02 NOTE — PROGRESS NOTES
05/02/19 1316   Signing Clinician's Name / Credentials   Signing clinician's name / credentials Ml Cortez DPT   Dynamic Gait Index (Norm and Darnell [place], 1995)   Gait Level Surface 3   Change in Gait Speed 3   Gait and Horizontal Head Turns 3   Gait with Vertical Head Turns 3   Gait and Pivot Turns 3   Step Over Obstacle 2   Step Around Obstacles 1   Steps 2   Total Dynamic Gait Index Score  (A score of 19 or less has been correlated to an increased risk of falls in community dwelling older adults, patients with vestibular disorders, and patients with MS.)   Total Score (out of 24) 20     Dynamic Gait Index (DGI):The DGI is a measure of balance during gait that is reliable and valid for the elderly and individuals with Parkinson's disease, MS, vestibular disorders, or s/p stroke. Gait assistive device used: none    Patient score: 20/24  Scores ?19/24 indicate an increased risk for falls according to Judy et al 2000  Minimal Detectable Change = 2.9 in community dwelling elderly according to Kahlil et al 2011    Assessment (rationale for performing, application to patient s function & care plan): pt s/p CVA with demonstrated impairments in balance, performed as objective outcome measure to assess risk for falls.      Minutes billed as physical performance test: 15 includes pt education

## 2019-05-02 NOTE — PLAN OF CARE
Acute Rehab Care Conference/Team Rounds      Type: Team Rounds    Present:Dr Alberto Castelan, Amirah Hurst Resident, Amber Omalley LICSW, Chasidy Schuster OT, Ml Cortez PT, Teri Hernandez SLP, Kasie eNw RN, Christel Enamorado Dietician, Lelo Acevedo , Shelley Noble      Discharge Barriers/Treatment/Education    Rehab Diagnosis: Hemorrhagic stroke associated with AV malformation    Active Medical Co-morbidities/Prognosis: Hypertension, urine retention    Safety: Pt is alert and oriented, independent in room without assistive device, pt found walking the halls on NOC and was redirected back into room educated on why we advise against it at this time.      Pain: Pt did not report pain overnight, but has reported pain in lower back and received tylenol, flexeril, and lidocaine patches. Pt reports that lidocaine patches are not effective.      Medications, Skin, Tubes/Lines: Pt would benefit from MAP prior to discharge, taking pills whole, skin is intact, no tubes lines or drains.     Swallowing/Nutrition:    Bowel/Bladder: Pt is intermittently incontinent of bowels due to urgency, LBM 5/1/19, attempting trial void starting on 5/2/2019     Psychosocial: Pt resides with his wife. Goal to return home with her continued support as needed. Team working towards a safe discharge plan.     ADLs/IADLs: Pt is IND in the room. Unsure how well he is managing his ADL independently though. During eval he needed some oversight. Pt is limited by cognition and had poor safety awareness and sequencing during kitchen task. Will do EFPT to further assess cognition and level of assist for other IADL. Pt may have visual deficits impacting safety as well. Anticipate that  He will need oversight with IADL including meds. Would recommend supervision initially upon discharge as pt transitions home. OP OT recommended.     Mobility: Pt participates well, is motivated. Pradhan 39/56 on 4/28 indicates increased risk for  fall, though DGI score 5/2 = 20/24 indicates balance has greatly improved and pt   is now safe for ind gait in room and on unit over even terrain. Recommend SBA in community and over uneven terrain. On track for safe discharge home this weekend with follow up OP PT.     Cognition/Language:SLP: noting deficits for memory, reasoning and executive function.  Pt below average on standardized testing, and noting difficulty with new learning.  At this time anticipate pt will need assistance with IADLS, with further SLP tx.     Community Re-Entry: SBA 2/2 cognition, mild balance impairments    Transportation: will need assist, transfer not a barrier, formal assessment prior to return to drive    Decision maker: self    Plan of Care and goals reviewed and updated.    Discharge Plan/Recommendations    Fall Precautions: continue    Overall plan for the patient: Al has been showing definite functional improvements, just advanced to up independent in his room at least during the day yesterday the hope to throughout his routine and longer distances by discharge.  Cognition does have some impairments. Concerns in kitchen. Trial Hinson catheter out today, so far considerable frequency and retention in 150-330 mL.  Hoping to discharge home this Sunday with ongoing outpatient PT, OT, SLP after discharge. [place] day Sat or Sunday.      Utilization Review and Continued Stay Justification    Medical Necessity Criteria:    For any criteria that is not met, please document reason and plan for discharge, transfer, or modification of plan of care to address.    Requires intensive rehabilitation program to treat functional deficits?: Yes    Requires 3x per week or greater involvement of rehabilitation physician to oversee rehabilitation program?: Yes    Requires rehabilitation nursing interventions?: Yes    Patient is making functional progress?: Yes    There is a potential for additional functional progress? Yes    Patient is  participating in therapy 3 hours per day a minimum of 5 days per week or 15 hours per week in 7 day period?:Yes    Has discharge needs that require coordinated discharge planning approach?:Yes        Final Physician Sign off    Statement of Approval: I agree with all the recommendations detailed in this document.    Patient Goals       1. Pt will discharge home/community setting upon completion of therapy/RN goals.  2. Pt and family will be involved in discharge planning and will be aware of services available to meet pts needs.                 OT goal: upper body dressing: Modified independent  OT goal: lower body dressing: Modified independent  OT goal: upper body bathing: Supervision/stand-by assist  OT goal: lower body bathing: Supervision/stand-by assist  OT Goal: transfer: Supervision/stand-by assist(tub shower)  OT goal: toilet transfer/toileting: Modified independent, cleaning and garment management, toilet transfer  OT goal: meal preparation: Modified independent, with simple meal preparation  OT goal: cognitive: Patient/caregiver will verbalize understanding of cognitive assessment results/recommendations as needed for safe discharge planning  OT goal 1: Pt will be IND with UE HEP to improve UE function for ADL and functional mobility     PT Frequency: 60 min daily  PT goal: bed mobility: Independent, Supine to/from sit  PT goal: transfers: Independent, Sit to/from stand  PT goal: gait: Independent, Greater than 200 feet  PT goal: stairs: Independent, 2 stairs(no rail)  PT goal: perform aerobic activity with stable cardiovascular response: 25 minutes, continuous activity, ambulation, NuStep     PT goal 1: improve Pradhan from 39/56 to >45/56 to indicate decreased risk for falls  PT Goal 2: using handout be ind w/ HEP for strength for improved functional mobility  PT Goal 3: using handout be ind for balance HEP for improved functional mobility  PT Goal 4: perform floor transfer w/ furniture assist, MIN A or <  to demo safe fall recovery  PT Goal 5: following education pt will state 3 or > fall prevention strategies to demo knowledge of how to reduce falls risk     SLP Frequency: Daily     SLP goal 1: Patient will complete moderate level memory tasks with 90% accuracy and use of compensatory strategies, as measured by SLP.  SLP goal 2: Patient will complete moderate level executive functioning tasks targeting processing speed with 90% accuracy and min cues, as measured by SLP.        Patient/Family Goal: Bladder: Patient will participate in a bladder program involving a voiding trial to void without tracy catheter during stay at [facility].     Goal: Skin Integrity: Patient will be remain free of a pressure injury by utilizing two pressure relieving techniques during stay at [facility].    Goal: Safety Management: Patient will demonstrate knowledge of safety techniques by using call light appropriately during stay at [facility].

## 2019-05-02 NOTE — PLAN OF CARE
Pt given 650mg tylenol for lower R back pain, continent of B/B and expresses his needs appropriately. Pt needs assistance x1 with tracy.

## 2019-05-02 NOTE — PROGRESS NOTES
[facility]   Acute Rehabilitation Unit  Daily progress note    INTERVAL HISTORY  Apolinar Mcgregor was seen and examined at bedside.     Patient had tracy removed this morning, has been voiding on his own. Denies urgency, dysuria. He feels he is emptying completely. Thus far is urinary fairly frequently. PVRs 150-284. Patient made to be ind on unit today. The patient endorses continued low back pain, but he feels this has improved with the ability to move around and be more active.       MEDICATIONS  Scheduled meds    amLODIPine  5 mg Oral Daily     artificial saliva  10 mL Swish & Spit 4x Daily     levETIRAcetam  500 mg Oral BID     lidocaine   Transdermal Q8H     metFORMIN  500 mg Oral Daily with supper     omeprazole  20 mg Oral QAM AC     tamsulosin  0.4 mg Oral Daily       PRN meds:  acetaminophen, bisacodyl, calcium carbonate, cyclobenzaprine, docusate sodium, Lidocaine, lidocaine, polyethylene glycol, simethicone      PHYSICAL EXAM  /85 (BP Location: Right arm)   Pulse 84   Temp 95.4  F (35.2  C) (Oral)   Resp 16   Ht 1.829 m (6')   Wt 93.9 kg (207 lb)   SpO2 96%   BMI 28.07 kg/m    Gen: NAD, sitting up at EOB  HEENT: EOMI  Cardio: RRR, no murmurs  Pulm: CTAB, no increased respiratory effort  Abd: nondistended, soft, BS +  Ext: moves all extremities spontaneously  Neuro/MSK: alert and oriented, speech coherent and comprehensible, occasional delays in processing or expression noted. 5/5 strength and ROM of BUE, BLE. 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 Functional impairments with balance, ADLs, cognition.  Medical comorbidities including HTN, GERD, DMT2, chronic back pain, urinary  retention, and constipation. Appropriate for inpatient acute rehabilitation.       1. Impairment of ADL's: patient CGA to min A upon admission with ADLs, also at times requiring verbal cuing. Standing tolerance at least 2 minutes. Sit-stand min A. Goal is to improve strength and cognition in order to perform ADLs with set up assist or better in order to safely discharge home.     2. Impairment of mobility: patient able to ambulate 200-300ft with CGA and without assistive device. Biggest issues with mobility include balance and coordination. Scored 39/56 on PACHECO. Goal is to improve balance, coordination, strength, and endurance in order for patient to discharge home safely as mod ind or better and to navigate at least 2 stairs in order to enter his home.     3. Impairment of cognition/language/swallow: patient presenting with slowed cognitive processing speeds vs. Mild word finding difficulties of speech. Full cognitive-linguistic eval to be performed at [facility]. Goal is for patient to improve speech and cognition in order to complete ADLs, understand safety, and participate in daily communication. No issues with swallow upon admission to rehab. Regular diet, thin liquids.     4. Rehab nursing: to assist and educate regarding environmental safety, medication management, self cares, bowel and bladder function, etc. Goal is for patient and / or family member to be responsible for these things at home.        Medical Conditions  1. 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 Patient is s/p emoblization on 4/26.   ? Goal SBP < 160  ? Keppra started per neurology, follow-up as outpatient  ? PT, OT, SLP for deficits   2. HTN: initially suspected as cause for hemorrhage, but controlled upon presentation. Continue PTA amlodipine 5mg. Monitor, goal as above.   3. GERD: continue omeprazole 20mg, tums PRN   4. DMT2: metformin held during acute stay,  instructed to restart per discharging hospitalist team. Metformin 500mg XR daily.  5. Chronic Back Pain: tylenol PRN, flexiril TID - decreased to PRN 4/30, lidoderm patch PRN  6. Adjustment to disability:  Clinical psychology to eval and treat if needed   7. Bowel: obstipation. Bowel meds as needed.    8. Bladder: TOV 5/2, patient voiding on own but having some elevated PVRs 100-200s. Will continue to monitor.   9. DVT Prophylaxis: SCDs at night, ambulating  10. GI Prophylaxis: omperazole  11. Code: FULL  12. Anticipated disposition: home with family   13. ELOS: 5/5  14. Rehab prognosis:  good  15. Follow up Appointments on Discharge: PCP, Neurology, PM&R     Amirah Hurst DO  PGY2 PM&R Resident

## 2019-05-02 NOTE — PROGRESS NOTES
CLINICAL NUTRITION SERVICES - BRIEF NOTE    Patient seen to obtain nutrition history and follow up re: supplement tolerance.    Patient reports he follows a regular diet at home with baseline good po intake of 3 meals/day. He reports he does not carb count. He reports weight has been stable.     During RD visit yesterday, pt reported he had cramps and poor po intake. Patient reports today that his appetite is great and he states he is eating well. Review of meals ordered suggests pt is inconsistently ordering meals (typically 2 meals/day). Anticipate meal intake my become more consistent now that patient states he is feeling better/ no longer has cramps. Meal intake so far has been documented as 100%. Patient reports he does not want nutrition supplements.    Interventions  1. Discontinue Boost GC shake per pt request. Encouraged intake of TID well-balanced meals.    Recommendations  Consider ordering Room Service with Assist if patient is consistently forgetting to order meal trays.    Follow Up/ Monitoring  RD will continue to follow per plan of care.        Kendra Enaomrado, MS, RD, LD

## 2019-05-03 LAB
GLUCOSE BLDC GLUCOMTR-MCNC: 104 MG/DL (ref 70–99)
GLUCOSE BLDC GLUCOMTR-MCNC: 124 MG/DL (ref 70–99)

## 2019-05-03 PROCEDURE — 25000132 ZZH RX MED GY IP 250 OP 250 PS 637: Performed by: STUDENT IN AN ORGANIZED HEALTH CARE EDUCATION/TRAINING PROGRAM

## 2019-05-03 PROCEDURE — 00000146 ZZHCL STATISTIC GLUCOSE BY METER IP

## 2019-05-03 PROCEDURE — 12800006 ZZH R&B REHAB

## 2019-05-03 PROCEDURE — 97116 GAIT TRAINING THERAPY: CPT | Mod: GP | Performed by: STUDENT IN AN ORGANIZED HEALTH CARE EDUCATION/TRAINING PROGRAM

## 2019-05-03 PROCEDURE — 97530 THERAPEUTIC ACTIVITIES: CPT | Mod: GP | Performed by: REHABILITATION PRACTITIONER

## 2019-05-03 PROCEDURE — 25000132 ZZH RX MED GY IP 250 OP 250 PS 637: Performed by: PHYSICAL MEDICINE & REHABILITATION

## 2019-05-03 PROCEDURE — 96125 COGNITIVE TEST BY HC PRO: CPT | Mod: GO | Performed by: OCCUPATIONAL THERAPIST

## 2019-05-03 PROCEDURE — 97530 THERAPEUTIC ACTIVITIES: CPT | Mod: GP | Performed by: STUDENT IN AN ORGANIZED HEALTH CARE EDUCATION/TRAINING PROGRAM

## 2019-05-03 PROCEDURE — 97127 ZZHC SP THERAPEUTIC INTERVENTION W/FOCUS ON COGNITIVE FUNCTION,EA 15 MIN: CPT | Mod: GN | Performed by: SPEECH-LANGUAGE PATHOLOGIST

## 2019-05-03 PROCEDURE — 97110 THERAPEUTIC EXERCISES: CPT | Mod: GP | Performed by: STUDENT IN AN ORGANIZED HEALTH CARE EDUCATION/TRAINING PROGRAM

## 2019-05-03 PROCEDURE — 97116 GAIT TRAINING THERAPY: CPT | Mod: GP | Performed by: REHABILITATION PRACTITIONER

## 2019-05-03 RX ADMIN — LEVETIRACETAM 500 MG: 500 TABLET, FILM COATED ORAL at 08:28

## 2019-05-03 RX ADMIN — Medication 10 ML: at 20:58

## 2019-05-03 RX ADMIN — Medication 10 ML: at 12:26

## 2019-05-03 RX ADMIN — OMEPRAZOLE 20 MG: 20 CAPSULE, DELAYED RELEASE ORAL at 06:47

## 2019-05-03 RX ADMIN — ACETAMINOPHEN 650 MG: 325 TABLET, FILM COATED ORAL at 06:47

## 2019-05-03 RX ADMIN — Medication 10 ML: at 18:47

## 2019-05-03 RX ADMIN — METFORMIN HYDROCHLORIDE 500 MG: 500 TABLET, EXTENDED RELEASE ORAL at 17:06

## 2019-05-03 RX ADMIN — POLYETHYLENE GLYCOL 3350 17 G: 17 POWDER, FOR SOLUTION ORAL at 08:28

## 2019-05-03 RX ADMIN — AMLODIPINE BESYLATE 5 MG: 5 TABLET ORAL at 08:27

## 2019-05-03 RX ADMIN — Medication 10 ML: at 08:28

## 2019-05-03 RX ADMIN — TAMSULOSIN HYDROCHLORIDE 0.4 MG: 0.4 CAPSULE ORAL at 08:27

## 2019-05-03 RX ADMIN — ACETAMINOPHEN 650 MG: 325 TABLET, FILM COATED ORAL at 21:29

## 2019-05-03 RX ADMIN — LEVETIRACETAM 500 MG: 500 TABLET, FILM COATED ORAL at 20:57

## 2019-05-03 NOTE — PLAN OF CARE
FOCUS/GOAL  Bowel management, Bladder management, Pain management and Mobility    ASSESSMENT, INTERVENTIONS AND CONTINUING PLAN FOR GOAL:  Patient is alert and oriented x 4 and is able to make needs known by using call light.  Patient is forgetful and repetition needed when explaining tasks and requests if task is not immediate. Patient had complaints of constipation and PRN Miralax was given, no results at this time.  Patient states he will consider another PRN medication this evening.  Ate both breakfast and lunch without calling for blood glucose check.  Minimal pain to back this shift, patient declined any pharmaceutical intervention and is using aqua K pad PRN.  Patient is voiding spontaneously, intermittent PVR's obtained due to patient not calling with each void, documented in flowsheet.   Writer reinforced to patient that he should call to be scanned with every void.  Is independent in room and on unit.

## 2019-05-03 NOTE — PLAN OF CARE
Patient has been continent in the urinal this evening. , 210.  A random bladder scan at 2200 was 283 (had not voided recently).  Continue to monitor PVR's to ensure he is not retaining.  Pt and wife are unsure if his BG meter at home works or not.  He did not routinely use it. They state they plan to follow up with endocrine prior to purchasing a new BG meter after discharge.  Pt complained of back pain. Flexeril given at 1730 and tylenol at 1830, both decreased the pain.

## 2019-05-03 NOTE — PROGRESS NOTES
Executive Function Performance Test (EFPT)     SUMMARY OF TEST:  The EFPT assesses patient performance for 4 functional tasks that are essential for self-maintenance and independent living. The tasks include meal prep, use of the telephone, medication administration, and bill pay. Each task assesses the following components of executive function: initiation, organization, sequencing, judgement/ safety, and termination.     Scoring:   Scoring is based on a 5 point scale:   0 = Independent  1 = Verbal Guidance  2 = Gestural Guidance  3 = Verbal Direct Instruction  4 = Physical Assistance  5 = Do for participant  A lower score indicates greater independence; a higher score indicates requirement for greater assistance.    DATE OF TESTIN/3/2019  Tasks   Task Total Task Score Time (min)   Cooking    Telephone 15/25 5   Medication    Bills    TOTAL 52/100 50 min   Details of Cognitive Performance:   Cognitive Construct Cook Telephone Meds Bills Total Score   Initiation 0: Independent 0: Independent 0: Independent 0: Independent 0   Organization 4: Physical Assistance 5: Do for patient 3: Verbal Direct Instruction 5: Do for Participant     Sequencing 4: Physical Assistance 4: Physical Assistance 2: Gestural Guidance 4: Physical Assistance    Judgement & Safety 4: Physical Assistance 3: Verbal Direct Instruction 3: Verbal Direct Instruction 3: Verbal Direct Instruction    Completion 1: Verbal Guidance 3: Verbal Direct Instruction 0: Independent 4: Physical Assistance      ?  INTERPRETATION OF TEST RESULTS:    Based on results of EFPT, patient currently requires assistance for the following: Complete meal preparation   Medication administration  Finances  Use of telephone  Assist with time management/scheduling appointments    Summary of functional cognitive status:   Deficits with organization  Deficits with sequencing  Deficits with judgement and safety    Examples of  "cognitive deficits identified during testing:   -Meal preparation: Requiring physical assistance to gather appropriate materials and sequence task even after directed to instructions. Requiring physical assistance to make accurate measurements of ingredients. Leaving stove on following task.   -Telephone: Requiring assistance to locate \"grocery\" in the phone book. Requiring physical assistance to dial phone number due to difficulties reading numbers and putting in numbers in a timely matter. Cues to recall directions.   -Medications: Requiring cueing to identify appropriate time/special instructions of medications (take with food and water)  -Finances: Significant cueing to organize several materials and sequencing multiple step directions     Factors affecting performance:  Impaired vision  Limited Endurance  Balance impairment    Recommendations:   -Ongoing assessment for medication administration: MAP  -Supervision with simple meal prep. No stove top or oven based meal prep  -Assistance with making appointments and using telephone  -Assistance with finances    TIME ADMINISTERING TEST: 50  TIME FOR INTERPRETATION AND PREPARATION OF REPORT: 15  TOTAL TIME: 65    "

## 2019-05-03 NOTE — PROGRESS NOTES
[facility]   Acute Rehabilitation Unit  Daily progress note    INTERVAL HISTORY  Apolinar Mcgregor was seen and examined at bedside. No acute events overnight. Patient received PRN tylenol and flexiril for low back pain. Patient continues to complain of LBP today. He feels the pain is due to the uncomfortable hospital bed mattress. His family is present, they inform us that at baseline the patient does have chronic LBP but does not complain about it much like he is now. Family was present this morning for stroke education class. Discussed patient going home, need for 24/7 supervision at first. Wife will be taking M-W off work and sister will be in town to take over on Th. PVRs continue to be elevated in 200s, except for most recent of 75. Discussed recommendation of urology f/u with patient and family, especially given his PMH of urologic procedure.     MEDICATIONS  Scheduled meds    amLODIPine  5 mg Oral Daily     artificial saliva  10 mL Swish & Spit 4x Daily     levETIRAcetam  500 mg Oral BID     lidocaine   Transdermal Q8H     metFORMIN  500 mg Oral Daily with supper     omeprazole  20 mg Oral QAM AC     tamsulosin  0.4 mg Oral Daily       PRN meds:  acetaminophen, bisacodyl, calcium carbonate, cyclobenzaprine, docusate sodium, Lidocaine, lidocaine, polyethylene glycol, simethicone      PHYSICAL EXAM  /86 (BP Location: Right arm)   Pulse 82   Temp 97.4  F (36.3  C) (Oral)   Resp 16   Ht 1.829 m (6')   Wt 93.9 kg (207 lb)   SpO2 96%   BMI 28.07 kg/m    Gen: NAD, sitting up in bed  HEENT: EOMI  Cardio: RRR, no murmurs  Pulm: CTAB, no iresp distress  Abd: nondistended, soft, BS +, nontender  Ext: moves all extremities spontaneously  Neuro/MSK: alert and oriented, speech coherent and comprehensible, at least antigravity strength throughout. Gait with appropriate base of support and no LOB without assistance or assistive device.       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 Functional impairments with balance, ADLs, cognition.  Medical comorbidities including HTN, GERD, DMT2, chronic back pain, urinary retention, and constipation. Appropriate for inpatient acute rehabilitation.       1. Impairment of ADL's: patient CGA to min A upon admission with ADLs, also at times requiring verbal cuing. Standing tolerance at least 2 minutes. Sit-stand min A. Goal is to improve strength and cognition in order to perform ADLs with set up assist or better in order to safely discharge home.     2. Impairment of mobility: patient able to ambulate 200-300ft with CGA and without assistive device. Biggest issues with mobility include balance and coordination. Scored 39/56 on PACHECO. Goal is to improve balance, coordination, strength, and endurance in order for patient to discharge home safely as mod ind or better and to navigate at least 2 stairs in order to enter his home.     3. Impairment of cognition/language/swallow: patient presenting with slowed cognitive processing speeds vs. Mild word finding difficulties of speech. Full cognitive-linguistic eval to be performed at ARU. Goal is for patient to improve speech and cognition in order to complete ADLs, understand safety, and participate in daily communication. No issues with swallow upon admission to rehab. Regular diet, thin liquids.     4. Rehab nursing: to assist and educate regarding environmental safety, medication management, self cares, bowel and bladder function, etc. Goal is for patient and / or family member to be responsible for these things at home.        Medical Conditions  1. 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 Patient is s/p emoblization on 4/26.   ? Goal SBP < 160  ? Keppra started per neurology,  follow-up as outpatient  ? PT, OT, SLP for deficits   2. HTN: initially suspected as cause for hemorrhage, but controlled upon presentation. Continue PTA amlodipine 5mg. Monitor, goal as above.   3. GERD: continue omeprazole 20mg, tums PRN   4. DMT2: metformin held during acute stay, instructed to restart per discharging hospitalist team. Metformin 500mg XR daily.  5. Chronic Back Pain: tylenol PRN, flexiril TID - decreased to PRN 4/30, lidoderm patch PRN  6. Adjustment to disability:  Clinical psychology to eval and treat if needed   7. Bowel: obstipation. Bowel meds as needed.    8. Bladder: TOV 5/2, patient voiding on own but having some elevated PVRs 100-200s. 5/3 continued elevations in PVRs, but not enough for SIC. Recommending continued monitoring while here, but also urology f/u as outpatient. Pt with prior hx of surgery for urethral stricture. Also seen by urology on 7/2/18 by Dr. Bijan Negro for urinary frequency.   9. DVT Prophylaxis: ambulating, ind on unit   10. GI Prophylaxis: omperazole  11. Code: FULL  12. Anticipated disposition: home with family. Educated patient and family on no driving at this time.  13. ELOS: 5/5  14. Rehab prognosis:  good  15. Follow up Appointments on Discharge: PCP, Neurology, PM&R, Urology      Amirah Hurst, DO  PGY2 PM&R Resident

## 2019-05-03 NOTE — CONSULTS
/03/19 1030 Klisch, Christine M, RN       Stroke Education Note     The following information has been reviewed with the patient and family:     1. Warning signs of stroke     2. Calling 911 if having warning signs of stroke     3. All modifiable risk factors: hypertension, CAD, atrial fib, diabetes, hypercholesterolemia, smoking, substance abuse, diet, physical inactivity, obesity, sleep apnea.     4. Patient's risk factors for stroke which include: HTN,DM     5. Follow-up plan for after discharge     6. Discharge medications which include: norvasc, metformin     In addition, the [facility] Stroke Class Handout has been given to the patient and family.     Learner's response to risk factors / lifestyle modification education: Taking steps      Christine M Klisch, RN

## 2019-05-03 NOTE — PLAN OF CARE
Pt on track for safe discharge home Sunday with follow up OP PT to address higher level balance in context of vision impairment, also back pain. Pt with greater deficits in SLP and OT domains with significant cognitive impairment.     39/56  PACHECO on 4/28 in acute hospital. DGI 5/2 = 20/24

## 2019-05-03 NOTE — PLAN OF CARE
FOCUS/GOAL  Bowel management, Bladder management, Skin integrity and Cognition/Memory/Judgment/Problem solving    ASSESSMENT, INTERVENTIONS AND CONTINUING PLAN FOR GOAL:  Pt is alert and oriented, reported lower back pain in am and received tylenol, independent without device on unit, reminded to call overnight when voiding for bladder scans, 1 PVR collected of 78, no further care concerns at this time continue with POC.

## 2019-05-03 NOTE — PLAN OF CARE
SLP: Patient seen for cognitive-communication treatment. Patient completed simple 2-digit numerical reasoning tasks with 20% accuracy independently and required min-moderate cues for improved accuracy. Patient used calculator for calculations and occasionally able to break problem into smaller parts on his own. Patient participated in game of [employer] (modified with cards face up first game) and required min cues for following rules of play.

## 2019-05-04 LAB
GLUCOSE BLDC GLUCOMTR-MCNC: 119 MG/DL (ref 70–99)
GLUCOSE BLDC GLUCOMTR-MCNC: 142 MG/DL (ref 70–99)

## 2019-05-04 PROCEDURE — 12800006 ZZH R&B REHAB

## 2019-05-04 PROCEDURE — 25000132 ZZH RX MED GY IP 250 OP 250 PS 637: Performed by: STUDENT IN AN ORGANIZED HEALTH CARE EDUCATION/TRAINING PROGRAM

## 2019-05-04 PROCEDURE — 25000132 ZZH RX MED GY IP 250 OP 250 PS 637: Performed by: PHYSICAL MEDICINE & REHABILITATION

## 2019-05-04 PROCEDURE — 97750 PHYSICAL PERFORMANCE TEST: CPT | Mod: GP

## 2019-05-04 PROCEDURE — 97535 SELF CARE MNGMENT TRAINING: CPT | Mod: GO

## 2019-05-04 PROCEDURE — 97110 THERAPEUTIC EXERCISES: CPT | Mod: GP

## 2019-05-04 PROCEDURE — 00000146 ZZHCL STATISTIC GLUCOSE BY METER IP

## 2019-05-04 PROCEDURE — 97127 ZZHC SP THERAPEUTIC INTERVENTION W/FOCUS ON COGNITIVE FUNCTION,EA 15 MIN: CPT | Mod: GN | Performed by: SPEECH-LANGUAGE PATHOLOGIST

## 2019-05-04 PROCEDURE — 97530 THERAPEUTIC ACTIVITIES: CPT | Mod: GP

## 2019-05-04 PROCEDURE — 97150 GROUP THERAPEUTIC PROCEDURES: CPT | Mod: GO

## 2019-05-04 RX ORDER — TAMSULOSIN HYDROCHLORIDE 0.4 MG/1
0.4 CAPSULE ORAL DAILY
Qty: 30 CAPSULE | Refills: 0 | Status: SHIPPED | OUTPATIENT
Start: 2019-05-04

## 2019-05-04 RX ORDER — AMLODIPINE BESYLATE 5 MG/1
5 TABLET ORAL DAILY
Qty: 30 TABLET | Refills: 0 | Status: SHIPPED | OUTPATIENT
Start: 2019-05-04

## 2019-05-04 RX ORDER — POLYETHYLENE GLYCOL 3350 17 G/17G
17 POWDER, FOR SOLUTION ORAL DAILY PRN
COMMUNITY
Start: 2019-05-04

## 2019-05-04 RX ORDER — LEVETIRACETAM 500 MG/1
500 TABLET ORAL 2 TIMES DAILY
Qty: 60 TABLET | Refills: 0 | Status: SHIPPED | OUTPATIENT
Start: 2019-05-04 | End: 2019-06-04

## 2019-05-04 RX ORDER — ACETAMINOPHEN 325 MG/1
650 TABLET ORAL EVERY 6 HOURS PRN
COMMUNITY
Start: 2019-05-04

## 2019-05-04 RX ORDER — FLUORIDE TOOTHPASTE
10 TOOTHPASTE DENTAL 4 TIMES DAILY PRN
COMMUNITY
Start: 2019-05-04

## 2019-05-04 RX ADMIN — METFORMIN HYDROCHLORIDE 500 MG: 500 TABLET, EXTENDED RELEASE ORAL at 17:07

## 2019-05-04 RX ADMIN — LEVETIRACETAM 500 MG: 500 TABLET, FILM COATED ORAL at 21:06

## 2019-05-04 RX ADMIN — Medication 10 ML: at 08:28

## 2019-05-04 RX ADMIN — LEVETIRACETAM 500 MG: 500 TABLET, FILM COATED ORAL at 08:27

## 2019-05-04 RX ADMIN — OMEPRAZOLE 20 MG: 20 CAPSULE, DELAYED RELEASE ORAL at 05:52

## 2019-05-04 RX ADMIN — Medication 10 ML: at 17:07

## 2019-05-04 RX ADMIN — POLYETHYLENE GLYCOL 3350 17 G: 17 POWDER, FOR SOLUTION ORAL at 12:43

## 2019-05-04 RX ADMIN — Medication 10 ML: at 21:06

## 2019-05-04 RX ADMIN — AMLODIPINE BESYLATE 5 MG: 5 TABLET ORAL at 08:27

## 2019-05-04 RX ADMIN — Medication 10 ML: at 12:40

## 2019-05-04 RX ADMIN — TAMSULOSIN HYDROCHLORIDE 0.4 MG: 0.4 CAPSULE ORAL at 08:27

## 2019-05-04 NOTE — PLAN OF CARE
Up independent in room without any assistive device, steady on feet. Mild low back pain. Declined pain reliever when offered. Per patient he is voiding well. I have asked him twice to call after he voids so we can scan his bladder. Has not called after voiding. Last BM 5-1, PRN Miralax given.

## 2019-05-04 NOTE — PLAN OF CARE
FOCUS/GOAL  Bowel management, Bladder management and Pain management    ASSESSMENT, INTERVENTIONS AND CONTINUING PLAN FOR GOAL:  Patient is alert, disoriented for time. Patient complained headache this shift, PRN Tylenol was given at the bedtime. Patient voided frequently. PVRs less than 300 mL this shift.

## 2019-05-04 NOTE — PLAN OF CARE
FOCUS/GOAL  Bladder management, Pain management, Mobility, Cognition/Memory/Judgment/Problem solving and Safety management    ASSESSMENT, INTERVENTIONS AND CONTINUING PLAN FOR GOAL:  Pt is alert and oriented. Continent of bladder, voiding frequently using urinal independently. Denied pain or discomfort. Pt is independent with bed mobility and in the room. Pt complained of difficulty sleeping d/t uncomfortable bed, egg crate mattress topper repositioned. Pt expressed improved comfort. Uses call light appropriately, able to make needs known. Will continue with POC.

## 2019-05-04 NOTE — PLAN OF CARE
Speech Language Therapy Discharge Summary    Reason for therapy discharge:    Discharged to home with outpatient therapy.    Progress towards therapy goal(s). See goals on Care Plan in [facility] electronic health record for goal details.  Goals met    Therapy recommendation(s):    Continued therapy is recommended.  Rationale/Recommendations:   .SLP: pt to discharge home with initial 24 hour supervision 2/2 cognitive deficits for safety.  Pt noted with moderate deficits for new learning, memory, problem solving/reasoning. Pt will need assist with complex IADLS (medications, finances). Recommend further SLP tx for improvement in cognitive communication skills for increased safety and independence with IADLS

## 2019-05-04 NOTE — PROGRESS NOTES
"OT: Pt participated in the established \"Transitions Group\" for stroke pts. Highlighting topics such as return to meaningful activities, health/wellness, fatigue, depression/anxiety, bowel/bladder considerations w/ community re-integration and caregiver wellness/support. Pt very receptive to class. Pt reports personal goals after discharge are to be able to just live his normal life.      "

## 2019-05-04 NOTE — PLAN OF CARE
Occupational Therapy Discharge Summary    Reason for therapy discharge:    All goals and outcomes met, no further needs identified.    Progress towards therapy goal(s). See goals on Care Plan in [facility] electronic health record for goal details.  Goals partially met.  Barriers to achieving goals:   Pt. Demos cognitive and visual impairments which limit his safety for higher level IADL tasks like cooking. .    Therapy recommendation(s):    Continued therapy is recommended.  Rationale/Recommendations:  Pt. To benefit from continued home health care to progress his home safety and cognition and be able to be home without A safely.

## 2019-05-04 NOTE — PROGRESS NOTES
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 Admitted on 4/29/2019.  Patient progressing through therapies, with current sustaining cares meeting needs. See primary team note for details.  - EMR reviewed, no acute overnight events, vitals stable   - labs, imaging none new  - reviewing medications, preparing for discharge tomorrow   - continue multidisciplinary therapies and plan of care    SUBJECTIVE/INTERVAL HX:    Patient was seen and examined at bedside rounds. Reports feeling well. Slept well. Denies, shortness of breath, chest pain, bowel/bladder changes, or new pain.     PHYSICAL EXAM:  Vitals: Temp: 96.8  F (36  C) Temp src: Oral BP: 130/82 Pulse: 91   Resp: 16 SpO2: 97 % O2 Device: None (Room air)    Gen: No acute distress, pleasant  HEENT: hearing present to speaking voice  CVS: RRR, no extremity edema noted  Resp: breathing unlabored at rest on room air  Abd: nondistended  MSK: moving all limbs spontaneously, walking ind throughout unit  Neuro: AOx3, communicative  Psych: nl affect    Pt discussed with Dr. Castelan.    Amirah Hurst  PGY-2 PM&R Resident  [facility] Acute Rehab  Pager: [phone]

## 2019-05-04 NOTE — PROGRESS NOTES
05/04/19 1300   Signing Clinician's Name / Credentials   Signing clinician's name / credentials Sma Phoenix PT, DPT   Pacheco Balance Scale (PACHECO AMY, DOMENICA S, LEANNE GASPAR, DANAE CANTU: MEASURING BALANCE IN THE ELDERLY: VALIDATION OF AN INSTRUMENT. CAN. J. PUB. HEALTH, JULY/AUGUST SUPPLEMENT 2:S7-11, 1992.)   Sit To Stand 4   Standing Unsupported 4   Sitting Unsupported 4   Stand to Sit 4   Transfers 4   Standing with Eyes Closed 4   Standing Unsupported, Feet Together 4   Reach Forward With Outstretched Arm 3   Retrieve Object From Floor 4   Turning to Look Behind 4   Turn 360 Degrees 4   Placing Alternate Foot on Stool (4-6 inches) 4   Unsupported Tandem Stand (Demonstrate to Subject) 3   One Leg Stand 3   Total Score (A score of 45 or less has been correlated with an increased risk of falls)   Total Score (out of 56) 53     Pacheco Balance Scale (BBS) Cutoff Scores for CVA Population:  Patient Score: 53/56    The BBS is a measure of static and dynamic standing balance that has been validated in community dwelling elderly individuals and individuals who have Parkinson's Disease, MS, and those who are s/p CVA and TBI. The test is administered without an assistive device. Scores from the Pacheco are used to determine the probability of falling based on the patient's previous history of falls and their test performance.     0-20 High risk for falling- Corresponded with w/c bound status  21-40 Medium risk for falling- Able to walk with assistance  41-56 Low risk for falling- Able to walk independently  According to [facility].  Available at http://www.strokecenter.org/.  Accessibility verified April 10, 2013.  Minimal Detectable Change = 6.5 according to Garrett & Carley 2008    Assessment (rationale for performing, application to patient s function & care plan): Re-assessing at discharged to determine fall risk from initial eval in acute care setting. New score indicating low fall risk and ok for IND  ambulation.  Improved from 39  Minutes billed as physical performance test: 10

## 2019-05-04 NOTE — PLAN OF CARE
Physical Therapy Discharge Summary    Reason for therapy discharge:    Discharged to home with outpatient therapy.    Progress towards therapy goal(s). See goals on Care Plan in [facility] electronic health record for goal details.  Goals met    Therapy recommendation(s):    Continued therapy is recommended.  Rationale/Recommendations:  Patient has met all mobility goals including improving PACHECO from 39/56 to 53/56.  Recommending outpatient PT for high level balance as well as for back pain.

## 2019-05-04 NOTE — DISCHARGE SUMMARY
PM&R DISCHARGE SUMMARY   Patient Name: Apolinar Mcgregor : 1960 Medical Record: [id number]    Date of Admission: 2019  Date of Discharge:   Disposition:   Primary Care Physician: Luis Fernando Rodriguez  Attending physician: Alberto Castelan MD    No Known Allergies       DISCHARGE DIAGNOSIS:     Rehab diagnosis/functional impairments:  1. Impaired functional mobility  2. Impaired ADLs  Medical Diagnosis:  1. 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 HTN  3. GERD  4. DMT2  5. 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 Functional impairments with balance, ADLs, cognition.  Medical comorbidities including HTN, GERD, DMT2, chronic back pain, urinary retention, and constipation. Appropriate for inpatient acute rehabilitation.       HOSPITAL COURSE:        1. Impairment of ADL's: patient CGA to min A upon admission with ADLs, also at times requiring verbal cuing. Standing tolerance at least 2 minutes. Sit-stand min A. Goal by discharge was to improve strength and cognition in order to perform ADLs with set up assist or better in order to safely discharge home.      2. Impairment of mobility: patient able to ambulate 200-300ft with CGA and without assistive device. Biggest issues with mobility included balance and coordination. Scored 39/56 on PACHECO. Goal was to improve balance, coordination, strength, and endurance in order for patient to discharge home safely as mod ind or better and to navigate at least 2 stairs in order to enter his home.      3. Impairment of cognition/language/swallow: patient presenting with slowed cognitive processing speeds vs. Mild word finding difficulties of speech. Full cognitive-linguistic eval performed at ARU. Goal was for patient to improve speech and cognition in order to complete ADLs,  understand safety, and participate in daily communication. No issues with swallow upon admission to rehab. Regular diet, thin liquids.      4. Rehab nursing: to assist and educate regarding environmental safety, medication management, self cares, bowel and bladder function, etc. Goal was for patient to be responsible for these things at home.         Medical Conditions  1. Acute Parenchymal Hemorrhage due to dAVF: hemorrhage present in occipital and parietal lobes, caused 4mm rightward midline shift. Patient is s/p emoblization on 4/26.   ? Goal SBP < 160  ? Keppra started per neurology, follow-up as outpatient to determine duration  ? PT, OT, SLP for deficits - continue all as outpatient  2. HTN: initially suspected as cause for hemorrhage, but controlled upon presentation. Continue PTA amlodipine 5mg. Monitor, goal as above. Refill provided at discharge, follow-up with PCP.  3. GERD: omeprazole 20mg, tums PRN   4. DMT2: metformin held during acute stay, instructed to restart per discharging hospitalist team. Metformin 500mg XR daily. Did not refill at discharge as patient said he had supply at home.  5. Chronic Back Pain: tylenol PRN, can continue at home. Flexiril TID scheduled was decreased to PRN on 4/30, patient only utilized occasionally. Patient felt his back pain was much to do with the bed being uncomfortable, he requested to not have a script for flexiril upon dicharge. Lidoderm patch PRN utilized minimally, patient felt slight relief, instructed to continue OTC if helpful.   6. Adjustment to disability: Clinical psychology available, but was not needed  7. Bowel: obstipation. Bowel meds provided as needed. Patient had issues with this before admission, follows with GI. PRNs given, can continue at home.   8. Bladder: admitted with tracy due to urinary retention. TOV 5/2, patient voided on own but had some elevated PVRs 100-200s, but not enough for SIC. PVRs ultimately improved, with some continued  retention. Recommend urology f/u as outpatient for urinary frequency and retention. Pt with prior hx of surgery for urethral stricture as well. Seen by urology on 7/2/18 by Dr. Bijan Negro for urinary frequency, recommend f/u with same provider.   9. DVT Prophylaxis: ambulating, ind on unit   10. GI Prophylaxis: omperazole  11. Code: FULL  12. Rehab prognosis:  good  13. Follow up Appointments on Discharge: PCP, Neurology, PM&R, Urology             PHYSICAL EXAM AT DISCHARGE   Vital Signs:   Vitals:    05/04/19 0731 05/04/19 1629 05/05/19 0748 05/05/19 0844   BP: 130/82 132/83 126/76 124/84   BP Location: Left arm Right arm Right arm Left arm   Pulse: 91 85 76 84   Resp: 16 16 17 16   Temp: 96.8  F (36  C) 96.2  F (35.7  C) 96  F (35.6  C) 96.5  F (35.8  C)   TempSrc: Oral Oral Oral Oral   SpO2: 97% 97% 99% 96%   Weight:       Height:         Gen: NAD, sitting in chair  HEENT: EOMI  Cardio: RRR, no murmurs  Pulm: CTAB, no resp distress  Abd: nondistended, soft, BS +, nontender  Ext: moves all extremities spontaneously, no BLE edema   Neuro/MSK: alert and oriented, speech coherent and comprehensible, 5/5 strength of BUE, BLE. Reflexes equal and symmetric. CNs II-XII grossly intact.         RECENT LAB STUDIES     Lab Results   Component Value Date    CR 0.82 04/29/2019     Lab Results   Component Value Date     04/29/2019    POTASSIUM 3.7 04/29/2019    CHLORIDE 107 04/29/2019    CO2 25 04/29/2019     (H) 04/29/2019     Lab Results   Component Value Date     (H) 04/29/2019     Lab Results   Component Value Date    HGB 13.5 04/29/2019     Lab Results   Component Value Date    WBC 7.5 04/29/2019            DISCHARGE INSTRUCTIONS and FOLLOW-UPS     After Care Instructions     Activity      Your activity upon discharge: activity as tolerated, please have 24/7 supervision for first 1-2 weeks until you, your family, and your outpatient providers can come up with a plan for safety while your function  continues to improve.         Diet      Follow this diet upon discharge: Orders Placed This Encounter      Room Service      Combination Diet Regular Diet Adult; Thin Liquids (water, ice chips, juice, milk, gelatin, ice cream, etc)             No driving at this time.          DISCHARGE MEDICATIONS      Apolinar Mcgregor   Home Medication Instructions BERTHA:[id number]    Printed on:05/04/19 6759   Medication Information                      acetaminophen (TYLENOL) 325 MG tablet  Take 2 tablets (650 mg) by mouth every 6 hours as needed for mild pain or fever             amLODIPine (NORVASC) 5 MG tablet  Take 1 tablet (5 mg) by mouth daily             artificial saliva (BIOTENE DRY MOUTHWASH) LIQD liquid  Swish and spit 10 mLs in mouth 4 times daily as needed for dry mouth             levETIRAcetam (KEPPRA) 500 MG tablet  Take 1 tablet (500 mg) by mouth 2 times daily             metFORMIN (GLUCOPHAGE-XR) 500 MG 24 hr tablet  Take 500-1,000 mg by mouth daily (with breakfast)             Omeprazole (PRILOSEC PO)  Take 20 mg by mouth every morning.             polyethylene glycol (MIRALAX/GLYCOLAX) packet  Take 17 g by mouth daily as needed for constipation             tamsulosin (FLOMAX) 0.4 MG capsule  Take 1 capsule (0.4 mg) by mouth daily                 Patient/family counselled and verbalizes good understanding of all discharge instructions.    Amirah Hurst DO  PGY2 PM&R Resident  Pager # [phone]

## 2019-05-05 VITALS
HEIGHT: 72 IN | BODY MASS INDEX: 28.04 KG/M2 | TEMPERATURE: 96.5 F | OXYGEN SATURATION: 96 % | DIASTOLIC BLOOD PRESSURE: 84 MMHG | RESPIRATION RATE: 16 BRPM | HEART RATE: 84 BPM | WEIGHT: 207 LBS | SYSTOLIC BLOOD PRESSURE: 124 MMHG

## 2019-05-05 LAB — GLUCOSE BLDC GLUCOMTR-MCNC: 118 MG/DL (ref 70–99)

## 2019-05-05 PROCEDURE — 00000146 ZZHCL STATISTIC GLUCOSE BY METER IP

## 2019-05-05 PROCEDURE — 25000132 ZZH RX MED GY IP 250 OP 250 PS 637: Performed by: STUDENT IN AN ORGANIZED HEALTH CARE EDUCATION/TRAINING PROGRAM

## 2019-05-05 RX ADMIN — Medication 10 ML: at 07:54

## 2019-05-05 RX ADMIN — ACETAMINOPHEN 650 MG: 325 TABLET, FILM COATED ORAL at 03:30

## 2019-05-05 RX ADMIN — TAMSULOSIN HYDROCHLORIDE 0.4 MG: 0.4 CAPSULE ORAL at 07:53

## 2019-05-05 RX ADMIN — OMEPRAZOLE 20 MG: 20 CAPSULE, DELAYED RELEASE ORAL at 06:29

## 2019-05-05 RX ADMIN — AMLODIPINE BESYLATE 5 MG: 5 TABLET ORAL at 07:53

## 2019-05-05 RX ADMIN — LEVETIRACETAM 500 MG: 500 TABLET, FILM COATED ORAL at 07:53

## 2019-05-05 NOTE — PROGRESS NOTES
OK to discharge home today  Feeling well overall. Some similar low back pain - chronic  Will  his prescriptions from his preferred [facility] pharmacy   New meds with amlodipine, keppra and tamsulosin.    See full discharge summary.

## 2019-05-05 NOTE — PLAN OF CARE
FOCUS/GOAL  Bowel management, Bladder management and discharge management    ASSESSMENT, INTERVENTIONS AND CONTINUING PLAN FOR GOAL:  Patient is alert, oriented x 4. No pain this shift. Independently ambulation this shift.  Last BM 5/3/2019. Patient voided frequently. PVRs less than 200 mL this shift. Planned discharge home with the family support tommorrow 5/5/2109.

## 2019-05-05 NOTE — PLAN OF CARE
FOCUS/GOAL  Bowel management, Bladder management, Pain management and Cognition/Memory/Judgment/Problem solving    ASSESSMENT, INTERVENTIONS AND CONTINUING PLAN FOR GOAL:  Pt is alert and oriented, reported lower back pain and received tylenol by request, denied CP, lightheadedness, dizziness, or new changes in sensation, independent on the unit without assistive device, reported having trouble sleeping and stated that is was due to the uncomfortable beds. Pt was reminded that he should be calling when he voids so a PVR can be assessed, no further care concerns at this time continue with POC and discharge.

## 2019-05-05 NOTE — PLAN OF CARE
Problem: Goal/Outcome  Goal: Goal Outcome Summary  Outcome: Adequate for Discharge  Note:   FOCUS/GOAL  Medical management, Discharge planning and Mobility    ASSESSMENT, INTERVENTIONS AND CONTINUING PLAN FOR GOAL:  Pt's vitals and BG stable this morning. Pt denied any pain. He is independent in room and unit. He has been walking around the unit this morning while waiting for his wife to pick him up for discharge. Discharge instructions reviewed with pt and wife. Discharged from unit ambulatory with assist from [facility].

## 2019-05-06 ENCOUNTER — DOCUMENTATION ONLY (OUTPATIENT)
Dept: CARE COORDINATION | Facility: CLINIC | Age: 59
End: 2019-05-06

## 2019-05-09 ENCOUNTER — HOSPITAL ENCOUNTER (OUTPATIENT)
Dept: SPEECH THERAPY | Facility: CLINIC | Age: 59
Setting detail: THERAPIES SERIES
End: 2019-05-09
Attending: PHYSICAL MEDICINE & REHABILITATION
Payer: COMMERCIAL

## 2019-05-09 DIAGNOSIS — I61.9 INTRAPARENCHYMAL HEMORRHAGE OF BRAIN (H): ICD-10-CM

## 2019-05-09 DIAGNOSIS — G31.84 MILD COGNITIVE IMPAIRMENT: ICD-10-CM

## 2019-05-09 PROCEDURE — 92523 SPEECH SOUND LANG COMPREHEN: CPT | Mod: GN,52 | Performed by: STUDENT IN AN ORGANIZED HEALTH CARE EDUCATION/TRAINING PROGRAM

## 2019-05-14 ENCOUNTER — HOSPITAL ENCOUNTER (OUTPATIENT)
Dept: OCCUPATIONAL THERAPY | Facility: CLINIC | Age: 59
Setting detail: THERAPIES SERIES
End: 2019-05-14
Attending: PHYSICAL MEDICINE & REHABILITATION
Payer: COMMERCIAL

## 2019-05-14 ENCOUNTER — HOSPITAL ENCOUNTER (OUTPATIENT)
Dept: SPEECH THERAPY | Facility: CLINIC | Age: 59
Setting detail: THERAPIES SERIES
End: 2019-05-14
Attending: PHYSICAL MEDICINE & REHABILITATION
Payer: COMMERCIAL

## 2019-05-14 DIAGNOSIS — I61.9 INTRAPARENCHYMAL HEMORRHAGE OF BRAIN (H): ICD-10-CM

## 2019-05-14 PROCEDURE — 97530 THERAPEUTIC ACTIVITIES: CPT | Mod: GO | Performed by: OCCUPATIONAL THERAPIST

## 2019-05-14 PROCEDURE — 97166 OT EVAL MOD COMPLEX 45 MIN: CPT | Mod: GO | Performed by: OCCUPATIONAL THERAPIST

## 2019-05-14 PROCEDURE — 92507 TX SP LANG VOICE COMM INDIV: CPT | Mod: GN | Performed by: STUDENT IN AN ORGANIZED HEALTH CARE EDUCATION/TRAINING PROGRAM

## 2019-05-14 NOTE — PROGRESS NOTES
[facility] OP SLP Language Evaluation  05/09/19 7059   General Information   Type of Evaluation Speech and Language   Type Of Visit Initial   Start Of Care Date 05/09/19   Referring Physician Alberto Castelan MD   Orders Evaluate And Treat   Medical Diagnosis Intraparenchymal hemorrhage of brain   Onset Of Illness/injury Or Date Of Surgery 04/22/19   Precautions/Limitations  no known precautions/limitations   Hearing WFL for 1:1 conversation in session   Surgical/Medical history reviewed Yes   Pertinent History Of Current Problem 58yo RHD male presenting to [facility] on 4/22/19, ultimately found to have intraparenchymal hemorrhage of the brain (occipital and parietal lobes) d/t dural arteriovenous fistula (dAVF), s/p emoldization on 4/26/19.  Pt received PT/OT/SLP services in the ARU from 4/29/19-5/5/19 when he was discharged home.  Pt reports some continued difficulty word finding in verbal and written tasks, as well as some slight difficulties with reading.  He notes that he frequently gets distracted and will forget what he is doing throughout the day.  For example, he was working on paying bills this morning with assistance from family members, but he would frequently forget which bills he had already paid.  He uses a calendar to remember appointments, and does not have difficulties with remembering to take medications.  PMH significant for HTN, GERD, DMT2.   Prior Level Of Function Comment No previous problems.  Pt reports that he was never a good speller, and he feels his spelling skills are at baseline.   Patient Role/employment History Employed  ([employer])   General Observations Pt was accompanied by a family member, Pari.  Pt frequently expressing pain during today's evaluation, reporting that it was difficult for him to concentrate on the evaluation because of the pain.   Patient/family Goals To improve word finding and reading abilities as much as possible   Pain Assessment   Pain Reported Yes    Pain Location Right lower back/hip   Pain Scale 8/10   Speech   Speech Comments Not formally assessed.  Judged to be 100% intelligible in conversation during evaluation.   Language: Auditory Comprehension (understanding of spoken language)   Tests were administered at the following levels Complex (vocation/community/social activities)   Commands; [place] Diagnostic Aphasia Exam 3 (out of 15 total) 11   Paragraph; Discourse Comprehension Test (out of 8 total; less than 7 is below mean) 5   Functional Assessment Scale (Auditory Comprehension) Minimal Impairment   Comments (Auditory Comprehension) Pt able to follow short verbal directions, but omits portions of longer directions (3 or more components).  Difficulty with stated and implied details for paragraph length material.   Language: Verbal Expression (use of spoken language to express information)   Tests were administered at the following levels Complex (vocation/community/social activities)   [place] Naming Test, short form (out of 15 total) 13  (Mean=13.75, SD=1.29; Improves to 14/15 with phonemic cue)   Define Words; [place] Test for Differential Diagnosis Of Aphasia (out of 10 total) 7   Generative Naming Score; Cognitive Linguistic Quick Test 4   Generative Naming; Cognitive Linguistic Quick Test Result Below mean  (Mean=6.57, SD=1.49)   Functional Assessment Scale (Verbal Expression) Mild Impairment   Comments (Verbal Expression) Pt demonstrating word finding impairment in conversational speech, as well as in generative naming tasks.  In define words task, pt demonstrating difficulty defining more abstract words without using a form of the word itself.   Reading Comprehension (understanding of written language)   Tests were administered at the following levels Moderate (routine daily activities);Complex (vocation/community/social activities)   Sentences and Paragraphs; [place] Diagnostic Aphasia Exam (out of 10 total) 9   Practical Reading (out of 7 total)  "0   Functional Assessment Scale (Reading Comprehension) Mild Impairment   Comments (Reading Comprehension) Practical Reading (prescription label) attempted, but pt unable to complete d/t confusion regarding the task.  Pt benefitting from increased processing time to read paragraph length material.   Written Expression (use of writing to express information)   Tests were administered at the following levels Basic (rote activities);Moderate (routine daily activities)   Mechanics of Writing; [place] Diagnostic Aphasia Exam (out of 5 total) 4   Generate Sentences; [place] Test for Differential Diagnosis Of Aphasia (out of 6 total) 6   Functional Assessment Scale (Written Expression) Minimal Impairment   Comments (Written Expression) Some impairments in handwriting.  Pt reports some word finding difficulty in writing as well as in verbal expression.  Sentences are grammatical and complete, but tend to be simple in structure (e.g. \"I want this.\").   Cognitive Status Examination   Cognitive Status Exam Comments Not completed today.  Informal screen to be completed at first therapy session.  Pt electing to end the evaluation early d/t fatigue from back pain.   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 "   Functional limitations due to impairments Reported difficulty with paying bills independently   Rehab potential affected by Age, motivation   Therapy Frequency 2 times;1 time   Predicted Duration of Therapy Intervention (days/wks) 2x/week for 4 weeks, then 1x/week for 4 weeks pending progress   Risks and Benefits of Treatment have been explained. Yes   Patient, Family & other staff in agreement with plan of care Yes   Clinical Impression Comments Mr. Mcgregor presents with impairments in language expression and reception on evaluation today s/p intraparenchymal hemorrhage of the occipital and parietal lobes on 4/22/19.  Cognitive-linguistic deficits are reported but not formally evaluated today d/t time contraints, as pt wished to end the evaluation early d/t significant pain in his lower back/hip.  Aphasia is mild in severity for verbal expression and reading comprehension, and minimal in severity for auditory comprehension and written expression.  Deficits are most apparent in lengthier verbal material (e.g. 3+part directions, paragraphs, conversation).  RECOMMEND: a course of skilled speech therapy targeting expressive/receptive language and cognitive-linguistic skills (screen to be completed at next session), in order to improve pt's ability to meet the demands of his job for an eventual return to work.   Language/Cognition Goals   Language/Cognition Goals 1;2;3;4   Language/Cognition Goal 1   Goal Identifier Verbal expression   Goal Description Patient will learn, implement, and demonstrate use of word-finding strategies to support verbal expression for everyday language tasks at work and at home across 3/4 communication breakdowns in session with min cues.     Target Date 08/09/19   Language/Cognition Goal 2   Goal Identifier Reading comprehension   Goal Description Patient will complete a moderately complex reading task (e.g. read a food/drug label, read a short letter or story and answer questions) with >90%  accy when given min assist to improve ability to meet daily reading needs at work and at home.   Target Date 08/09/19   Language/Cognition Goal 3   Goal Identifier Written expression   Goal Description Patient will complete a moderately complex writing task (e.g. write an email 1 paragraph in length) with 90% accy for word findings and grammar when given min assist to meet daily writing needs at work.   Target Date 08/09/19   Language/Cognition Goal 4   Goal Identifier Auditory comprehension   Goal Description To improve auditory comprehension for everyday tasks, patient will implement compensatory strategies to complete a moderately complex listening task (e.g. follow 3-part instructions, listen to a short story and answer questions) with 90% accy when given min assist.     Target Date 08/09/19   Total Session Time   Sound production with lang comprehension and expression minutes ([id number]) 50   Total Evaluation Time 50     Thank you for the referral of this patient.    Allison Alpers, B.A. (music), M.A., CCC-SLP  Speech-Language Pathologist  Certificate of Vocology  [facility]  [phone]

## 2019-05-14 NOTE — PROGRESS NOTES
05/14/19 0800   Quick Adds   Type of Visit Initial Outpatient Occupational Therapy Evaluation   General Information   Start Of Care Date 05/14/19   Referring Physician Dr. Alberto Castelan MD   Orders Evaluate and treat as indicated   Other Orders PT, SLP   Orders Date 05/03/19   Medical Diagnosis Intraparenchymal hemorrhage of brain   Onset of Illness/Injury or Date of Surgery 04/22/19   Precautions/Limitations No known precautions/limitations   Surgical/Medical History Reviewed Yes   Additional Occupational Profile Info/Pertinent History of Current Problem Pt is a 59 year old male. PMH significant for HTN, GERD, DMT2, chronic back pain, urinary retention, and constipation who presented to [facility] ED on 4/22/19 with altered mental status and was found to have an acute parenchymal hemorrhage. The hemorrhage was found in the left parietal and occipital lobes, measuring ~4.7cm with 4mm rightward midline shift. Repeat CT and MRI after presentation showed stable hemorrhage and mass effect. CTA showed left occipital dural AVF. Patient underwent dAVF emobolization on 4/26. Keppra was initiated per neurology. NSGY consulted during acute admission, no surgical intervention required   Comments/Observations Sister present for evaluation, reports that patien'ts wife is not helpful at home.   Role/Living Environment   Current Community Support Family/friend caregiver   Patient role/Employment history Retired   Community/Avocational Activities Yardwork   Current Living Environment House   Number of Stairs to Enter Home 2   Home/Community Accessibility Comments Pt's home is 1-story rambler with basement that pt does not need to access.   Prior Level - Transfers Independent   Prior Level - Ambulation Independent   Prior Level - ADLS Independent   Prior Responsibilities - IADL Yardwork;Medication management;Finances;Driving;Shopping;Meal Preparation   Role/Living Environment Comments Pt's sister staying with him  for 2 more weeks.   Patient/family Goals Statement To get back to where he was, motor coordination, meal prep, memory, safety with visual scanning, driving   Pain   Patient currently in pain Yes   Pain location Lower back   Pain rating Gets worse with increased standing time   Fall Risk Screen   Fall screen comments PT will complete.   Cognitive Status Examination   Orientation Orientation to person, place and time   Level of Consciousness Alert   Follows Commands and Answers Questions 100% of the time  (Additional processing time)   Personal Safety and Judgment Intact   Memory Impaired   Memory Comments Pt reports it is hard to concentrate; STM is hard   Attention Distractible during evaluation  (Pain)   Organization/Problem Solving Reports problems with organization;Reports problems with problem solving during evaluation;Sequencing impaired;Problem solving impaired;Prioritizing of information/tasks impaired   Executive Function Impulsive   Cognitive Comment Completed MOCA d/t memory/cognitive concerns, pt scored 19/30 on version B.   Visual Perception   Visual Perception Wears glasses   Visual Field L gaze preference and body posture noted, will further assess   Oculomotor Poor convergence (blurring at 12 inches, double vision at 3 inches)   Visual Perception Comments Pt reports eye muscle weakness but no visual changes. Pursuits and saccades intatct, will further assess vision.   Sensation   Sensation Comments Pt reports numbness in his face but none in hands/feet   Hand Strength   Hand Dominance Right   Left Hand  (pounds) 79.6 pounds   Right Hand  (pounds) 78.6 pounds   Hand Strength Comments B  strength 1SD below the mean.   Coordination   Upper Extremity Coordination Right UE impaired;Left UE impaired   Left Hand, Nine Hole Peg Test (seconds) 30   Right Hand, Nine Hole Peg Test (seconds) 35   Coordination Comments B FMC >2SD below the mean, pt reports no difficulties with fine motor aspects of  ADLs (buttoning, zippering, etc)   Functional Mobility   Ambulation Pt able to independently navigate the environment with extra time.   Bathing   Bathing Comments Pt independent in all ADL tasks   Instrumental Activities of Daily Living Assessment   IADL Assessment/Observations Pt's wife has taken over the finances; pt managing his own medications without the use of a pillbox; pt has only used microwave since returning home from hospital, would like to return to more complex meal prep   Planned Therapy Interventions   Planned Therapy Interventions ADL training;Cognitive skills;Coordination training;Neuromuscular re-education;ROM;Self care/Home management;Strengthening;Stretching;Therapeutic activities;Visual perception   Adult OT Eval Goals   OT Eval Goals (Adult) 1;2;3;4;5;6    OT Goal 1   Goal Identifier 1-Memory   Goal Description Pt will identify 3 memory compensation strategies to increase safety and independence with ADL activities (to remember appointments, to take medications, etc)   Target Date 08/06/19    OT Goal 2   Goal Identifier 2-Problem solving   Goal Description Pt will complete moderately complex problem solving activity with 90% accuracy to set up new medications, cooking tasks, etc.   Target Date 08/06/19    OT Goal 3   Goal Identifier 3-Sequencing   Goal Description Pt will complete multi-item errands list with 90% accuracy in an appropriate amount of time and be most efficient to improve problem solving ability, attention to details, and sequencing for ADL activities and managing his schedule.   Target Date 08/06/19   OT Goal 4   Goal Identifier 4-Meal prep   Goal Description Pt will demonstrate mod I with moderately complex meal preparation task to increase safety and independence with meal preparation.   Target Date 08/06/19   OT Goal 5   Goal Identifier 5-Visual scanning   Goal Description Patient to complete visual scanning tasks (pen/paper and scanning in dynamic environment) with 90%  accuracy using compensatory strategies prn for increased safety and independence with reading-based ADLs, cooking, etc.   Target Date 08/06/19    OT Goal 6   Goal Identifier 6-Coordination   Goal Description Patient to demonstrate improved  hand coordination by completing the 9-Hole-Peg Test in 25 seconds in both hands for increased independence with FM ADLs (manipulating buttons, zippers, handwriting, etc.).   Target Date 08/06/19   Clinical Impression   Criteria for Skilled Therapeutic Interventions Met Yes, treatment indicated   OT Diagnosis Decreased ADL performance   Influenced by the following impairments R inattention, impaired memory, impaired sequencing/problem solving   Assessment of Occupational Performance 1-3 Performance Deficits   Clinical Decision Making (Complexity) Moderate complexity   Therapy Frequency 2x/week   Predicted Duration of Therapy Intervention (days/wks) 8 weeks   Risks and Benefits of Treatment have been explained. Yes   Patient, Family & other staff in agreement with plan of care Yes   Clinical Impression Comments Pt will benefit from skilled OT to address above impairments for increase safety and independence in ADLs, meal prep, etc. to return to prior level of functioning.   Education Assessment   Barriers To Learning Language;Visual;Cognitive   Preferred Learning Style Reading;Demonstration;Pictures/video   Total Evaluation Time   OT Eval, Moderate Complexity Minutes (27843) 45

## 2019-05-14 NOTE — ADDENDUM NOTE
Encounter addended by: Alpers, Allison E, SLP on: 5/14/2019 9:16 AM   Actions taken: Sign clinical note, Flowsheet accepted

## 2019-05-15 ENCOUNTER — HOSPITAL ENCOUNTER (OUTPATIENT)
Dept: PHYSICAL THERAPY | Facility: CLINIC | Age: 59
Setting detail: THERAPIES SERIES
End: 2019-05-15
Attending: PHYSICAL MEDICINE & REHABILITATION
Payer: COMMERCIAL

## 2019-05-15 ENCOUNTER — HOSPITAL ENCOUNTER (OUTPATIENT)
Dept: SPEECH THERAPY | Facility: CLINIC | Age: 59
Setting detail: THERAPIES SERIES
End: 2019-05-15
Attending: PHYSICAL MEDICINE & REHABILITATION
Payer: COMMERCIAL

## 2019-05-15 DIAGNOSIS — I61.9 INTRAPARENCHYMAL HEMORRHAGE OF BRAIN (H): ICD-10-CM

## 2019-05-15 PROCEDURE — 97161 PT EVAL LOW COMPLEX 20 MIN: CPT | Mod: GP | Performed by: PHYSICAL THERAPIST

## 2019-05-15 PROCEDURE — 97110 THERAPEUTIC EXERCISES: CPT | Mod: GP | Performed by: PHYSICAL THERAPIST

## 2019-05-15 PROCEDURE — 92507 TX SP LANG VOICE COMM INDIV: CPT | Mod: GN | Performed by: STUDENT IN AN ORGANIZED HEALTH CARE EDUCATION/TRAINING PROGRAM

## 2019-05-15 NOTE — PROGRESS NOTES
05/15/19 0800   Quick Adds   Type of Visit Initial OP PT Evaluation   General Information   Start of Care Date 05/15/19   Referring Physician Alberto Castelan MD   Orders Evaluate and Treat as Indicated   Order Date 05/03/19   Medical Diagnosis Intraparenchymal hemorrhage of brain   Onset of illness/injury or Date of Surgery 04/22/19   Surgical/Medical history reviewed Yes   Pertinent history of current problem (include personal factors and/or comorbidities that impact the POC) presented to [facility] ED on 4/22/19 with altered mental status and was found to have an acute parenchymal hemorrhage. The hemorrhage was found in the left parietal and occipital lobes, measuring ~4.7cm with 4mm rightward midline shift. Repeat CT and MRI after presentation showed stable hemorrhage and mass effect. CTA showed left occipital dural AVF. Patient underwent dAVF emobolization on 4/26. The client reports having significant increase in low back pain since stroke as well as some concerns for strength and balance. Back pain is the worst with sitting for long periods.  Walking seems to help low back pain, heat also seems to help. Also taking some tylenol for back pain.    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  Would walk about 30-40 minutes on track at [employer]. Has been doing about 20 minute walks since leaving hospital.    Patient role/Employment history Retired   Living environment House/[facility]   Patient/Family Goals Statement Improve back pain, get back to normal   Fall Risk Screen   Timed Up and Go score (seconds) 10.2 seconds   Is patient a fall risk? No   Pain   Patient currently in pain Yes   Pain comments Low back pain, sometimes into flank at times as well   Cognitive Status Examination   Cognitive Comment Refer to OT evaluation   Posture   Posture Forward head position;Protracted shoulders;Kyphosis   Palpation   Palpation  Significant increase in muscle tightness in lumbar parspinals, QL   Range of Motion (ROM)   ROM Comment Grossly WFL in LEs- pain limiting hip flexion at this time   Strength   Strength Comments MMT is performed cautiously due to low back pain today- able to demonstrate functional strength with sit>stand and at least 4/5 with hip flexion, hip abduction and hip extension   Bed Mobility   Bed Mobility Comments slow and painful due to back pain   Gait   Gait Comments Ambulates with slow gait speed, demonstrates external rotation of legs with gait   Balance   Balance Comments Able to stand NBOS EO and EC with SBA, unable to stand SLS   Sensory Examination   Sensory Perception Comments not formally assessed today   Coordination   Coordination no deficits were identified   Muscle Tone   Muscle Tone no deficits were identified   Modality Interventions   Planned Modality Interventions TENS   Planned Therapy Interventions   Planned Therapy Interventions balance training;gait training;neuromuscular re-education;strengthening;stretching;manual therapy   Clinical Impression   Criteria for Skilled Therapeutic Interventions Met yes, treatment indicated   PT Diagnosis Impaired functional mobility, back pain   Influenced by the following impairments Increased low back pain, impaired LE functional strength, impaired activity tolerance, impaired static and dynamic balance   Functional limitations due to impairments Decreased tolerance to role as caregiver, impaired independence with mobility, 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   Clinical Presentation Stable/Uncomplicated   Clinical Decision Making (Complexity) Low complexity   Therapy Frequency 1 time/week   Predicted Duration of Therapy Intervention (days/wks) 8 weeks   Risk & Benefits of therapy have been explained Yes   Patient, Family & other staff in agreement with plan of care Yes   GOALS   PT Eval Goals 1;2;3   Goal 1   Goal Identifier Low back Pain   Goal  Description The client will report an average low back with sitting at 2/10 or less for improved tolerance for cars   Target Date 06/14/19   Goal 2   Goal Identifier 6MWT   Goal Description The client will improve 6MWT by at least 150 feet for improved activity tolerance and gait speed.    Target Date 07/13/19   Goal 3   Goal Identifier FGA   Goal Description The client will score at least 23/30 on FGA in order to demonstrate a decreased risk for falling.    Target Date 07/13/19   Total Evaluation Time   PT Eval, Low Complexity Minutes (14877) 12

## 2019-05-16 ENCOUNTER — HOSPITAL ENCOUNTER (OUTPATIENT)
Dept: SPEECH THERAPY | Facility: CLINIC | Age: 59
Setting detail: THERAPIES SERIES
End: 2019-05-16
Attending: PHYSICAL MEDICINE & REHABILITATION
Payer: COMMERCIAL

## 2019-05-16 PROCEDURE — 92507 TX SP LANG VOICE COMM INDIV: CPT | Mod: GN | Performed by: STUDENT IN AN ORGANIZED HEALTH CARE EDUCATION/TRAINING PROGRAM

## 2019-05-17 ENCOUNTER — HOSPITAL ENCOUNTER (OUTPATIENT)
Dept: OCCUPATIONAL THERAPY | Facility: CLINIC | Age: 59
Setting detail: THERAPIES SERIES
End: 2019-05-17
Attending: PHYSICAL MEDICINE & REHABILITATION
Payer: COMMERCIAL

## 2019-05-17 PROCEDURE — 97530 THERAPEUTIC ACTIVITIES: CPT | Mod: GO | Performed by: OCCUPATIONAL THERAPIST

## 2019-05-19 NOTE — ADDENDUM NOTE
Addendum  created 05/19/19 1500 by Henri Simpson MD    Attestation recorded in Intraprocedure, Intraprocedure Attestations filed

## 2019-05-21 ENCOUNTER — HOSPITAL ENCOUNTER (OUTPATIENT)
Dept: OCCUPATIONAL THERAPY | Facility: CLINIC | Age: 59
Setting detail: THERAPIES SERIES
End: 2019-05-21
Attending: PHYSICAL MEDICINE & REHABILITATION
Payer: COMMERCIAL

## 2019-05-21 ENCOUNTER — HOSPITAL ENCOUNTER (OUTPATIENT)
Dept: SPEECH THERAPY | Facility: CLINIC | Age: 59
Setting detail: THERAPIES SERIES
End: 2019-05-21
Attending: PHYSICAL MEDICINE & REHABILITATION
Payer: COMMERCIAL

## 2019-05-21 PROCEDURE — 92507 TX SP LANG VOICE COMM INDIV: CPT | Mod: GN | Performed by: STUDENT IN AN ORGANIZED HEALTH CARE EDUCATION/TRAINING PROGRAM

## 2019-05-21 PROCEDURE — 97535 SELF CARE MNGMENT TRAINING: CPT | Mod: GO | Performed by: OCCUPATIONAL THERAPIST

## 2019-05-21 PROCEDURE — 97530 THERAPEUTIC ACTIVITIES: CPT | Mod: GO,59 | Performed by: OCCUPATIONAL THERAPIST

## 2019-05-22 ENCOUNTER — HOSPITAL ENCOUNTER (OUTPATIENT)
Dept: OCCUPATIONAL THERAPY | Facility: CLINIC | Age: 59
Setting detail: THERAPIES SERIES
End: 2019-05-22
Attending: PHYSICAL MEDICINE & REHABILITATION
Payer: COMMERCIAL

## 2019-05-22 ENCOUNTER — HOSPITAL ENCOUNTER (OUTPATIENT)
Dept: SPEECH THERAPY | Facility: CLINIC | Age: 59
Setting detail: THERAPIES SERIES
End: 2019-05-22
Attending: PHYSICAL MEDICINE & REHABILITATION
Payer: COMMERCIAL

## 2019-05-22 PROCEDURE — 97535 SELF CARE MNGMENT TRAINING: CPT | Mod: GO | Performed by: REHABILITATION PRACTITIONER

## 2019-05-22 PROCEDURE — 92507 TX SP LANG VOICE COMM INDIV: CPT | Mod: GN | Performed by: STUDENT IN AN ORGANIZED HEALTH CARE EDUCATION/TRAINING PROGRAM

## 2019-05-28 ENCOUNTER — OFFICE VISIT (OUTPATIENT)
Dept: NEUROLOGY | Facility: CLINIC | Age: 59
End: 2019-05-28

## 2019-05-28 ENCOUNTER — HOSPITAL ENCOUNTER (OUTPATIENT)
Dept: SPEECH THERAPY | Facility: CLINIC | Age: 59
Setting detail: THERAPIES SERIES
End: 2019-05-28
Attending: PHYSICAL MEDICINE & REHABILITATION
Payer: COMMERCIAL

## 2019-05-28 VITALS
HEIGHT: 70 IN | SYSTOLIC BLOOD PRESSURE: 128 MMHG | DIASTOLIC BLOOD PRESSURE: 76 MMHG | WEIGHT: 199.8 LBS | TEMPERATURE: 97.7 F | BODY MASS INDEX: 28.6 KG/M2 | HEART RATE: 68 BPM | OXYGEN SATURATION: 97 %

## 2019-05-28 DIAGNOSIS — I61.0 NONTRAUMATIC SUBCORTICAL HEMORRHAGE OF LEFT CEREBRAL HEMISPHERE (H): ICD-10-CM

## 2019-05-28 DIAGNOSIS — I67.1 DAVF (DURAL ARTERIOVENOUS FISTULA): Primary | ICD-10-CM

## 2019-05-28 PROCEDURE — 92507 TX SP LANG VOICE COMM INDIV: CPT | Mod: GN | Performed by: STUDENT IN AN ORGANIZED HEALTH CARE EDUCATION/TRAINING PROGRAM

## 2019-05-28 ASSESSMENT — ENCOUNTER SYMPTOMS
JOINT SWELLING: 0
EYE PAIN: 0
DISTURBANCES IN COORDINATION: 1
MUSCLE CRAMPS: 0
DECREASED CONCENTRATION: 0
NECK PAIN: 0
SEIZURES: 1
BLOOD IN STOOL: 0
BACK PAIN: 1
PARALYSIS: 0
LOSS OF CONSCIOUSNESS: 0
MYALGIAS: 0
DOUBLE VISION: 0
NERVOUS/ANXIOUS: 0
CONSTIPATION: 0
DIFFICULTY URINATING: 1
HEADACHES: 0
HEMATURIA: 0
BOWEL INCONTINENCE: 0
RECTAL PAIN: 0
TREMORS: 0
FLANK PAIN: 0
EYE IRRITATION: 0
EYE WATERING: 0
NAUSEA: 0
DYSURIA: 0
DEPRESSION: 0
BLOATING: 0
MEMORY LOSS: 1
HEARTBURN: 1
DIZZINESS: 0
EYE REDNESS: 0
WEAKNESS: 0
SPEECH CHANGE: 0
MUSCLE WEAKNESS: 0
VOMITING: 0
JAUNDICE: 0
INSOMNIA: 0
PANIC: 0
NUMBNESS: 0
DIARRHEA: 0
TINGLING: 0
ABDOMINAL PAIN: 0
STIFFNESS: 0

## 2019-05-28 ASSESSMENT — MIFFLIN-ST. JEOR: SCORE: 1727.54

## 2019-05-28 ASSESSMENT — PAIN SCALES - GENERAL: PAINLEVEL: SEVERE PAIN (6)

## 2019-05-28 NOTE — PROGRESS NOTES
"Dear Dr. Rodriguez,    It was a pleasure seeing Mr. Mcgregor in our vascular neurology clinic today. As you know, he is a 59 year old man who presents for follow up of ruptured left occipital dAVF s/p embolization. He had presented on 4/2019 with confusion and was found to have left parieto-occipital ICH. Cerebral angiogram revealed left occipital dAVF Cognard type III. He subsequently underwent successful endovascular embolization with total obliteration of daVF. He is doing well at this time. He states that his confusion (not knowing what to do in all situation) which was present initially has completely resolved. He does state that he is clumsy at times with his right leg at times and trips it with his left leg. He is improving over all though. He has also noted right back pain which does not radiate anywhere. It is \"gnawing\" in quality. It is improved by lying down and worsened with walking or sitting. No urinary or bowel incontinence. He has not seen anybody for this.     Above imaging and laboratory personally reviewed.    ROS: 10 point ROS neg other than the symptoms noted above in the HPI.    Past medical history:   Past Medical History:   Diagnosis Date     Gastro-oesophageal reflux disease      Hypertension      Urinary retention        Past surgical history:   Past Surgical History:   Procedure Laterality Date     CHOLECYSTECTOMY  2000     IR CAROTID CEREBRAL ANGIOGRAM BILATERAL  4/24/2019     IR CAROTID CEREBRAL ANGIOGRAM LEFT  4/26/2019     URETHROPLASTY WITH BUCCAL GRAFT  6/12/2013    Procedure: URETHROPLASTY WITH BUCCAL GRAFT;  Anterior Urethroplasty with Buccal Graft ;  Surgeon: Dmitry Moore MD;  Location: [facility] OR       Family history:  No history of dAVF/ICH in family.    Social history:  Social History     Socioeconomic History     Marital status:      Spouse name: Not on file     Number of children: Not on file     Years of education: Not on file     Highest education level: Not on file " "  Occupational History     Not on file   Social Needs     Financial resource strain: Not on file     Food insecurity:     Worry: Not on file     Inability: Not on file     Transportation needs:     Medical: Not on file     Non-medical: Not on file   Tobacco Use     Smoking status: Former Smoker     Smokeless tobacco: Never Used   Substance and Sexual Activity     Alcohol use: Yes     Comment: occasional     Drug use: No     Sexual activity: Never   Lifestyle     Physical activity:     Days per week: Not on file     Minutes per session: Not on file     Stress: Not on file   Relationships     Social connections:     Talks on phone: Not on file     Gets together: Not on file     Attends [employer] service: Not on file     Active member of club or organization: Not on file     Attends meetings of clubs or organizations: Not on file     Relationship status: Not on file     Intimate partner violence:     Fear of current or ex partner: Not on file     Emotionally abused: Not on file     Physically abused: Not on file     Forced sexual activity: Not on file   Other Topics Concern     Not on file   Social History Narrative     Not on file       Medications:  Current Outpatient Medications   Medication     acetaminophen (TYLENOL) 325 MG tablet     amLODIPine (NORVASC) 5 MG tablet     levETIRAcetam (KEPPRA) 500 MG tablet     metFORMIN (GLUCOPHAGE-XR) 500 MG 24 hr tablet     Omeprazole (PRILOSEC PO)     polyethylene glycol (MIRALAX/GLYCOLAX) packet     artificial saliva (BIOTENE DRY MOUTHWASH) LIQD liquid     tamsulosin (FLOMAX) 0.4 MG capsule     No current facility-administered medications for this visit.        Physical examination:  /76 (BP Location: Left arm, Patient Position: Sitting, Cuff Size: Adult Large)   Pulse 68   Temp 97.7  F (36.5  C) (Oral)   Ht 1.778 m (5' 10\")   Wt 90.6 kg (199 lb 12.8 oz)   SpO2 97%   BMI 28.67 kg/m      HEENT: No carotid bruits  Lungs: Clear to auscultation bilaterally  CVS: +S1S2 " no murmurs  Abd/back: Non-distended, non-tender with palpation of spine  Neuro:  Awake, alert, oriented x 3, follows all commands, fluent speech, no neglect  PERRL, EOMI, no nystagmus, VF full, no facial drooping, no slurring of speech  Motor: Moves all ext well, no abnormal movements  Sensory: No sensory deficit throughout to gross tough  FTN is normal  Gait is normal, tandem walk is normal    mRS - 1, NIHSS - 0    Impression and Plan:    Mr. Mcgregor is doing well post dAVF embolization. He will need follow up cerebral angiogram in 5 months to make sure the fistula is obliterated. We will call him around 10/2019 to schedule the angiogram. As for the back pain, I suspect musculoskeletal origin. He will see PMR physician for follow up.    Thank you for allowing us to participate in Mr. Mcgregor's case.    Raffy Thompson MD  Vascular/Interventional Neurology

## 2019-05-28 NOTE — LETTER
"5/28/2019       RE: Apolinar Mcgregor  [address] [id number]     Dear Colleague,    Thank you for referring your patient, Apolinar Mcgregor, to the [facility] NEUROLOGY at [facility]. Please see a copy of my visit note below.    Dear Dr. Rodriguez,    It was a pleasure seeing Mr. Mcgregor in our vascular neurology clinic today. As you know, he is a 59 year old man who presents for follow up of ruptured left occipital dAVF s/p embolization. He had presented on 4/2019 with confusion and was found to have left parieto-occipital ICH. Cerebral angiogram revealed left occipital dAVF Cognard type III. He subsequently underwent successful endovascular embolization with total obliteration of daVF. He is doing well at this time. He states that his confusion (not knowing what to do in all situation) which was present initially has completely resolved. He does state that he is clumsy at times with his right leg at times and trips it with his left leg. He is improving over all though. He has also noted right back pain which does not radiate anywhere. It is \"gnawing\" in quality. It is improved by lying down and worsened with walking or sitting. No urinary or bowel incontinence. He has not seen anybody for this.     Above imaging and laboratory personally reviewed.    ROS: 10 point ROS neg other than the symptoms noted above in the HPI.    Past medical history:   Past Medical History:   Diagnosis Date     Gastro-oesophageal reflux disease      Hypertension      Urinary retention        Past surgical history:   Past Surgical History:   Procedure Laterality Date     CHOLECYSTECTOMY  2000     IR CAROTID CEREBRAL ANGIOGRAM BILATERAL  4/24/2019     IR CAROTID CEREBRAL ANGIOGRAM LEFT  4/26/2019     URETHROPLASTY WITH BUCCAL GRAFT  6/12/2013    Procedure: URETHROPLASTY WITH BUCCAL GRAFT;  Anterior Urethroplasty with Buccal Graft ;  Surgeon: Dmitry Moore MD;  Location: [facility] OR "       Family history:  No history of dAVF/ICH in family.    Social history:  Social History     Socioeconomic History     Marital status:      Spouse name: Not on file     Number of children: Not on file     Years of education: Not on file     Highest education level: Not on file   Occupational History     Not on file   Social Needs     Financial resource strain: Not on file     Food insecurity:     Worry: Not on file     Inability: Not on file     Transportation needs:     Medical: Not on file     Non-medical: Not on file   Tobacco Use     Smoking status: Former Smoker     Smokeless tobacco: Never Used   Substance and Sexual Activity     Alcohol use: Yes     Comment: occasional     Drug use: No     Sexual activity: Never   Lifestyle     Physical activity:     Days per week: Not on file     Minutes per session: Not on file     Stress: Not on file   Relationships     Social connections:     Talks on phone: Not on file     Gets together: Not on file     Attends Anabaptism service: Not on file     Active member of club or organization: Not on file     Attends meetings of clubs or organizations: Not on file     Relationship status: Not on file     Intimate partner violence:     Fear of current or ex partner: Not on file     Emotionally abused: Not on file     Physically abused: Not on file     Forced sexual activity: Not on file   Other Topics Concern     Not on file   Social History Narrative     Not on file       Medications:  Current Outpatient Medications   Medication     acetaminophen (TYLENOL) 325 MG tablet     amLODIPine (NORVASC) 5 MG tablet     levETIRAcetam (KEPPRA) 500 MG tablet     metFORMIN (GLUCOPHAGE-XR) 500 MG 24 hr tablet     Omeprazole (PRILOSEC PO)     polyethylene glycol (MIRALAX/GLYCOLAX) packet     artificial saliva (BIOTENE DRY MOUTHWASH) LIQD liquid     tamsulosin (FLOMAX) 0.4 MG capsule     No current facility-administered medications for this visit.        Physical examination:  /76  "(BP Location: Left arm, Patient Position: Sitting, Cuff Size: Adult Large)   Pulse 68   Temp 97.7  F (36.5  C) (Oral)   Ht 1.778 m (5' 10\")   Wt 90.6 kg (199 lb 12.8 oz)   SpO2 97%   BMI 28.67 kg/m       HEENT: No carotid bruits  Lungs: Clear to auscultation bilaterally  CVS: +S1S2 no murmurs  Abd/back: Non-distended, non-tender with palpation of spine  Neuro:  Awake, alert, oriented x 3, follows all commands, fluent speech, no neglect  PERRL, EOMI, no nystagmus, VF full, no facial drooping, no slurring of speech  Motor: Moves all ext well, no abnormal movements  Sensory: No sensory deficit throughout to gross tough  FTN is normal  Gait is normal, tandem walk is normal    mRS - 1, NIHSS - 0    Impression and Plan:    Mr. Mcgregor is doing well post dAVF embolization. He will need follow up cerebral angiogram in 5 months to make sure the fistula is obliterated. We will call him around 10/2019 to schedule the angiogram. As for the back pain, I suspect musculoskeletal origin. He will see PMR physician for follow up.    Thank you for allowing us to participate in Mr. Mcgregor's case.    Raffy Thompson MD  Vascular/Interventional Neurology      "

## 2019-05-29 ENCOUNTER — HOSPITAL ENCOUNTER (OUTPATIENT)
Dept: OCCUPATIONAL THERAPY | Facility: CLINIC | Age: 59
Setting detail: THERAPIES SERIES
End: 2019-05-29
Attending: PHYSICAL MEDICINE & REHABILITATION
Payer: COMMERCIAL

## 2019-05-29 PROCEDURE — 97535 SELF CARE MNGMENT TRAINING: CPT | Mod: GO,59 | Performed by: OCCUPATIONAL THERAPIST

## 2019-05-29 PROCEDURE — 97530 THERAPEUTIC ACTIVITIES: CPT | Mod: GO | Performed by: OCCUPATIONAL THERAPIST

## 2019-05-30 ENCOUNTER — HOSPITAL ENCOUNTER (OUTPATIENT)
Dept: PHYSICAL THERAPY | Facility: CLINIC | Age: 59
Setting detail: THERAPIES SERIES
End: 2019-05-30
Attending: PHYSICAL MEDICINE & REHABILITATION
Payer: COMMERCIAL

## 2019-05-30 ENCOUNTER — HOSPITAL ENCOUNTER (OUTPATIENT)
Dept: SPEECH THERAPY | Facility: CLINIC | Age: 59
Setting detail: THERAPIES SERIES
End: 2019-05-30
Attending: PHYSICAL MEDICINE & REHABILITATION
Payer: COMMERCIAL

## 2019-05-30 PROCEDURE — 92507 TX SP LANG VOICE COMM INDIV: CPT | Mod: GN | Performed by: STUDENT IN AN ORGANIZED HEALTH CARE EDUCATION/TRAINING PROGRAM

## 2019-05-30 PROCEDURE — 97112 NEUROMUSCULAR REEDUCATION: CPT | Mod: GP,59 | Performed by: PHYSICAL THERAPIST

## 2019-05-30 PROCEDURE — 97140 MANUAL THERAPY 1/> REGIONS: CPT | Mod: GP | Performed by: PHYSICAL THERAPIST

## 2019-05-31 ENCOUNTER — HOSPITAL ENCOUNTER (OUTPATIENT)
Dept: OCCUPATIONAL THERAPY | Facility: CLINIC | Age: 59
Setting detail: THERAPIES SERIES
End: 2019-05-31
Attending: PHYSICAL MEDICINE & REHABILITATION
Payer: COMMERCIAL

## 2019-05-31 PROCEDURE — 97110 THERAPEUTIC EXERCISES: CPT | Mod: GO | Performed by: OCCUPATIONAL THERAPIST

## 2019-05-31 PROCEDURE — 97112 NEUROMUSCULAR REEDUCATION: CPT | Mod: GO | Performed by: OCCUPATIONAL THERAPIST

## 2019-05-31 PROCEDURE — 97535 SELF CARE MNGMENT TRAINING: CPT | Mod: GO | Performed by: OCCUPATIONAL THERAPIST

## 2019-06-04 ENCOUNTER — HOSPITAL ENCOUNTER (OUTPATIENT)
Dept: SPEECH THERAPY | Facility: CLINIC | Age: 59
Setting detail: THERAPIES SERIES
End: 2019-06-04
Attending: PHYSICAL MEDICINE & REHABILITATION
Payer: COMMERCIAL

## 2019-06-04 ENCOUNTER — HOSPITAL ENCOUNTER (OUTPATIENT)
Dept: OCCUPATIONAL THERAPY | Facility: CLINIC | Age: 59
Setting detail: THERAPIES SERIES
End: 2019-06-04
Attending: PHYSICAL MEDICINE & REHABILITATION
Payer: COMMERCIAL

## 2019-06-04 ENCOUNTER — HOSPITAL ENCOUNTER (OUTPATIENT)
Dept: PHYSICAL THERAPY | Facility: CLINIC | Age: 59
Setting detail: THERAPIES SERIES
End: 2019-06-04
Attending: PHYSICAL MEDICINE & REHABILITATION
Payer: COMMERCIAL

## 2019-06-04 ENCOUNTER — TELEPHONE (OUTPATIENT)
Dept: NEUROLOGY | Facility: CLINIC | Age: 59
End: 2019-06-04

## 2019-06-04 DIAGNOSIS — G40.909 SEIZURE DISORDER (H): ICD-10-CM

## 2019-06-04 PROCEDURE — 92507 TX SP LANG VOICE COMM INDIV: CPT | Mod: GN | Performed by: SPEECH-LANGUAGE PATHOLOGIST

## 2019-06-04 PROCEDURE — 97127 ZZHC OT THERAPEUTIC INTERVENTION W/FOCUS ON COGNITIVE FUNCTION,EA 15 MIN: CPT | Mod: GO | Performed by: OCCUPATIONAL THERAPIST

## 2019-06-04 PROCEDURE — 97140 MANUAL THERAPY 1/> REGIONS: CPT | Mod: GP | Performed by: PHYSICAL THERAPIST

## 2019-06-04 PROCEDURE — 97112 NEUROMUSCULAR REEDUCATION: CPT | Mod: GP,59 | Performed by: PHYSICAL THERAPIST

## 2019-06-04 RX ORDER — LEVETIRACETAM 500 MG/1
500 TABLET ORAL 2 TIMES DAILY
Qty: 60 TABLET | Refills: 0 | Status: SHIPPED | OUTPATIENT
Start: 2019-06-04 | End: 2019-06-06

## 2019-06-04 NOTE — TELEPHONE ENCOUNTER
"Consent to communicate signed 5/9/19.   [facility] informing Lorraine that call will be routed to Dr. Thompson to advise and that patient should not abruptly discontinue levetiracetam.     Per Dr. Tavarez's 4/28/19 progess note: \"On admission patient was agitated with right lateral tongue bite most likely secondary to seizure. Recommendations: Keppra 500mg BID until Neuro follow-up for seizure associated with ICH.\"      "

## 2019-06-04 NOTE — TELEPHONE ENCOUNTER
Spoke with wife, Lorraine. Levetiracetam refilled for 30 days. Will contact Lorraine with follow-up plan.

## 2019-06-04 NOTE — TELEPHONE ENCOUNTER
Health Call Center    Phone Message    May a detailed message be left on voicemail: no    Reason for Call: Medication Question or concern regarding medication   Prescription Clarification  Name of Medication: levETIRAcetam (KEPPRA) 500 MG tablet  Prescribing Provider: Dr. Castelan, related to stroke dx   Pharmacy: [facility], [place]   What on the order needs clarification? Pt's wife wants to know if the Pt should get a refill on the medication or not or keep taking it after his stroke. Pt has 1 pill left and 0 refills. Please call Lorraine back to discuss.          Action Taken: Message routed to:  [facility]

## 2019-06-06 RX ORDER — LEVETIRACETAM 500 MG/1
500 TABLET ORAL 2 TIMES DAILY
Qty: 60 TABLET | Refills: 3 | Status: SHIPPED | OUTPATIENT
Start: 2019-06-06 | End: 2019-07-18

## 2019-06-06 NOTE — TELEPHONE ENCOUNTER
Lorraine informed of Dr. Thompson's recommendations. Levetiracetam refilled with 3 additional fills. Lorraine voiced understanding and has no further questions at this time. Sonia Caraballo RN 6/6/2019 9:24 AM

## 2019-06-07 ENCOUNTER — HOSPITAL ENCOUNTER (OUTPATIENT)
Dept: SPEECH THERAPY | Facility: CLINIC | Age: 59
Setting detail: THERAPIES SERIES
End: 2019-06-07
Attending: PHYSICAL MEDICINE & REHABILITATION
Payer: COMMERCIAL

## 2019-06-07 ENCOUNTER — HOSPITAL ENCOUNTER (OUTPATIENT)
Dept: OCCUPATIONAL THERAPY | Facility: CLINIC | Age: 59
Setting detail: THERAPIES SERIES
End: 2019-06-07
Attending: PHYSICAL MEDICINE & REHABILITATION
Payer: COMMERCIAL

## 2019-06-07 PROCEDURE — 97535 SELF CARE MNGMENT TRAINING: CPT | Mod: GO | Performed by: OCCUPATIONAL THERAPIST

## 2019-06-07 PROCEDURE — 92507 TX SP LANG VOICE COMM INDIV: CPT | Mod: GN | Performed by: SPEECH-LANGUAGE PATHOLOGIST

## 2019-06-11 ENCOUNTER — HOSPITAL ENCOUNTER (OUTPATIENT)
Dept: OCCUPATIONAL THERAPY | Facility: CLINIC | Age: 59
Setting detail: THERAPIES SERIES
End: 2019-06-11
Attending: PHYSICAL MEDICINE & REHABILITATION
Payer: COMMERCIAL

## 2019-06-11 PROCEDURE — 97127 ZZHC OT THERAPEUTIC INTERVENTION W/FOCUS ON COGNITIVE FUNCTION,EA 15 MIN: CPT | Mod: GO | Performed by: OCCUPATIONAL THERAPIST

## 2019-06-11 PROCEDURE — 97535 SELF CARE MNGMENT TRAINING: CPT | Mod: GO | Performed by: OCCUPATIONAL THERAPIST

## 2019-06-14 ENCOUNTER — HOSPITAL ENCOUNTER (OUTPATIENT)
Dept: OCCUPATIONAL THERAPY | Facility: CLINIC | Age: 59
Setting detail: THERAPIES SERIES
End: 2019-06-14
Attending: PHYSICAL MEDICINE & REHABILITATION
Payer: COMMERCIAL

## 2019-06-14 ENCOUNTER — HOSPITAL ENCOUNTER (OUTPATIENT)
Dept: SPEECH THERAPY | Facility: CLINIC | Age: 59
Setting detail: THERAPIES SERIES
End: 2019-06-14
Attending: PHYSICAL MEDICINE & REHABILITATION
Payer: COMMERCIAL

## 2019-06-14 PROCEDURE — 97127 ZZHC OT THERAPEUTIC INTERVENTION W/FOCUS ON COGNITIVE FUNCTION,EA 15 MIN: CPT | Mod: GO | Performed by: OCCUPATIONAL THERAPIST

## 2019-06-14 PROCEDURE — 92507 TX SP LANG VOICE COMM INDIV: CPT | Mod: GN | Performed by: SPEECH-LANGUAGE PATHOLOGIST

## 2019-06-18 ENCOUNTER — HOSPITAL ENCOUNTER (OUTPATIENT)
Dept: OCCUPATIONAL THERAPY | Facility: CLINIC | Age: 59
Setting detail: THERAPIES SERIES
End: 2019-06-18
Attending: PHYSICAL MEDICINE & REHABILITATION
Payer: COMMERCIAL

## 2019-06-18 ENCOUNTER — HOSPITAL ENCOUNTER (OUTPATIENT)
Dept: SPEECH THERAPY | Facility: CLINIC | Age: 59
Setting detail: THERAPIES SERIES
End: 2019-06-18
Attending: PHYSICAL MEDICINE & REHABILITATION
Payer: COMMERCIAL

## 2019-06-18 PROCEDURE — 92507 TX SP LANG VOICE COMM INDIV: CPT | Mod: GN | Performed by: STUDENT IN AN ORGANIZED HEALTH CARE EDUCATION/TRAINING PROGRAM

## 2019-06-18 PROCEDURE — 97127 ZZHC OT THERAPEUTIC INTERVENTION W/FOCUS ON COGNITIVE FUNCTION,EA 15 MIN: CPT | Mod: GO,59 | Performed by: OCCUPATIONAL THERAPIST

## 2019-06-20 ENCOUNTER — HOSPITAL ENCOUNTER (OUTPATIENT)
Dept: OCCUPATIONAL THERAPY | Facility: CLINIC | Age: 59
Setting detail: THERAPIES SERIES
End: 2019-06-20
Attending: PHYSICAL MEDICINE & REHABILITATION
Payer: COMMERCIAL

## 2019-06-20 PROCEDURE — 97530 THERAPEUTIC ACTIVITIES: CPT | Mod: GO | Performed by: OCCUPATIONAL THERAPIST

## 2019-06-24 ENCOUNTER — HOSPITAL ENCOUNTER (OUTPATIENT)
Dept: OCCUPATIONAL THERAPY | Facility: CLINIC | Age: 59
Setting detail: THERAPIES SERIES
End: 2019-06-24
Attending: PHYSICAL MEDICINE & REHABILITATION
Payer: COMMERCIAL

## 2019-06-24 PROCEDURE — 97535 SELF CARE MNGMENT TRAINING: CPT | Mod: GO | Performed by: REHABILITATION PRACTITIONER

## 2019-06-25 ENCOUNTER — HOSPITAL ENCOUNTER (OUTPATIENT)
Dept: SPEECH THERAPY | Facility: CLINIC | Age: 59
Setting detail: THERAPIES SERIES
End: 2019-06-25
Attending: PHYSICAL MEDICINE & REHABILITATION
Payer: COMMERCIAL

## 2019-06-25 PROCEDURE — 92507 TX SP LANG VOICE COMM INDIV: CPT | Mod: GN | Performed by: STUDENT IN AN ORGANIZED HEALTH CARE EDUCATION/TRAINING PROGRAM

## 2019-07-02 ENCOUNTER — HOSPITAL ENCOUNTER (OUTPATIENT)
Dept: OCCUPATIONAL THERAPY | Facility: CLINIC | Age: 59
Setting detail: THERAPIES SERIES
End: 2019-07-02
Attending: PHYSICAL MEDICINE & REHABILITATION
Payer: COMMERCIAL

## 2019-07-02 PROCEDURE — 97127 ZZHC OT THERAPEUTIC INTERVENTION W/FOCUS ON COGNITIVE FUNCTION,EA 15 MIN: CPT | Mod: GO | Performed by: OCCUPATIONAL THERAPIST

## 2019-07-09 ENCOUNTER — HOSPITAL ENCOUNTER (OUTPATIENT)
Dept: OCCUPATIONAL THERAPY | Facility: CLINIC | Age: 59
Setting detail: THERAPIES SERIES
End: 2019-07-09
Attending: PHYSICAL MEDICINE & REHABILITATION
Payer: COMMERCIAL

## 2019-07-09 PROCEDURE — 97535 SELF CARE MNGMENT TRAINING: CPT | Mod: GO | Performed by: OCCUPATIONAL THERAPIST

## 2019-07-09 NOTE — PROGRESS NOTES
Outpatient Occupational Therapy Discharge Note     Patient: Apolinar Mcgregor  : 1960    Beginning/End Dates of Reporting Period:  2019 to 2019    Referring Provider: Dr. Alberto Castelan MD    Therapy Diagnosis: Intraparenchymal hemorrhage of brain    Client Self Report: Patient feeling that he is ready to be done with OT.   He has been driving and has no concerns.  He reports his energy level is improving but his back is his limiting factor with activity.     Objective Measurements:     Objective Measure: BiVaba Scancourse   Details:  (did not stop walking)     Objective Measure: Dynavision Mode A   Details: 62 (average is 52+); Mode A continuous: 285 (average is 200+)     Objective Measure: Pepper visual skills for reading test   Details: 2M size, 98.6% accuracy corrected reading rate 61.7 (100-120 WNL)   Objective Measure: MN read   Details: Critical print size 1.0M, Speed at .6     Objective Measure: Dynavision Mode B   Details: 70 (average is 42+)     Objective Measure: Dynavision Mode B divided attentions   Details: 48 (9/10 #s) (average is 35+)      Goals:     Goal Identifier 1-Memory   Goal Description Pt will identify 3 memory compensation strategies to increase safety and independence with ADL activities (to remember appointments, to take medications, etc)   Target Date 19   Date Met      Progress: Goal partially met.  Patient educated on memory strategies to improve short-term recall but demonstrates difficulty with using during tasks.  He was able to verbalize strategies he could use but difficulty initiating.  Continue to encourage patient to engage in activities to maintain cognitive fitness.     Goal Identifier 2-Problem solving   Goal Description Pt will complete moderately complex problem solving activity with 90% accuracy to set up new medications, cooking tasks, etc.   Target Date 19   Date Met  19   Progress:  Goal met.     Goal Identifier 3-Sequencing    Goal Description Pt will complete multi-item errands list with 90% accuracy in an appropriate amount of time and be most efficient to improve problem solving ability, attention to details, and sequencing for ADL activities and managing his schedule.   Target Date 08/06/19   Date Met      Progress:  Goal not met-patient reports this was difficult for him to complete prior to his ICH.  He was given a daily schedule and has been following.     Goal Identifier Goal discharged-Meal prep   Goal Description Pt will demonstrate mod I with moderately complex meal preparation task to increase safety and independence with meal preparation.   Target Date 08/06/19   Date Met  (no longer a goal)   Progress:     Goal Identifier 5-Visual scanning   Goal Description Patient to complete visual scanning tasks (pen/paper and scanning in dynamic environment) with 90% accuracy using compensatory strategies prn for increased safety and independence with reading-based ADLs, cooking, etc.   Target Date 08/06/19   Date Met  07/02/19   Progress:     Goal Identifier 6-Coordination   Goal Description Patient to demonstrate improved  hand coordination by completing the 9-Hole-Peg Test in 25 seconds in both hands for increased independence with FM ADLs (manipulating buttons, zippers, handwriting, etc.).   Target Date 08/06/19   Date Met  07/09/19   Progress:     Goal Identifier 7-Strength/endurance   Goal Description Pt will demonstrate mod I with BUE HEP for increased strength and endurance for ADL household tasks.   Target Date 08/06/19   Date Met  (deferred to PT due to recent back fracture)   Progress:       Progress Toward Goals:   Progress this reporting period: Patient has been seen for a total of 15 OP OT sessions following his ICH for cognitive testing/retraining, fatigue management, fine motor coordination training, and education on strategies to improve R visual inattention.  Patient has made excellent progress and is ready to be  discharged from OT.  He has improved his FMC, has been educated on fatigue management (although recent compression fracture impacting his fatigue), educated on memory compensation and improved executive functioning skills.      Plan:  Discharge from therapy.    Discharge:    Reason for Discharge: Patient has met all goals.  No further expectation of progress.  Patient chooses to discontinue therapy.    Discharge Plan: Patient to continue home program.

## 2019-07-17 ENCOUNTER — TELEPHONE (OUTPATIENT)
Dept: NEUROLOGY | Facility: CLINIC | Age: 59
End: 2019-07-17

## 2019-07-17 DIAGNOSIS — G40.909 SEIZURE DISORDER (H): ICD-10-CM

## 2019-07-17 NOTE — TELEPHONE ENCOUNTER
"Patient states since he was discharged from the hospital on 5/5/19 he has had a shakiness in his hands, which worsens 20-30 minutes after he takes levetiracetam. When he holds his hands out they shake. Also reports a general feeling of \"nervousness\".     This morning he was sleeping in his chair, felt fine, then took medications and started to get jittery again. States he seems calm until he takes medications.    He takes all medications together. He does not have medication list at time of call, but recalls amlodipine, metformin, flomax and crestor, levetiracetam as medications he is taking.     States he was seen in urgent care for increased urination. UC MD reportedly thought it was anxiety and wanted to prescribe buspar. Patient declined.     Patient would like to change anticonvulsant medications, if appropriate.     Routed to Dr. Thompson to advise.   "

## 2019-07-17 NOTE — TELEPHONE ENCOUNTER
[facility] Call Center    Phone Message    May a detailed message be left on voicemail: yes    Reason for Call: Other: Pt called in stated that since hes been taking levETIRAcetam (KEPPRA) 500 MG tablet for the last month and has been feeling shaky and had anxiety wants to know if its from medication please call him back, Thank you!     Action Taken: Message routed to:  [facility] ([facility]): neuro

## 2019-07-18 ENCOUNTER — TELEPHONE (OUTPATIENT)
Dept: MEDSURG UNIT | Facility: CLINIC | Age: 59
End: 2019-07-18

## 2019-07-18 RX ORDER — LEVETIRACETAM 500 MG/1
500 TABLET ORAL DAILY
Qty: 30 TABLET | Refills: 0 | COMMUNITY
Start: 2019-07-18

## 2019-07-18 NOTE — TELEPHONE ENCOUNTER
Patient informed to decrease levetiracetam to 500 mg daily. Patient verbalized understanding. Medication list updated to reflect change.

## 2019-07-18 NOTE — TELEPHONE ENCOUNTER
[facility] Call Center    Phone Message    May a detailed message be left on voicemail: yes    Reason for Call: Other: Pt is calling back, he needs to know can he maybe just take one of the Keppra pills?  Pt states this medication is not working and he wants to speak with the nurse today     Action Taken: Message routed to:  [facility] Neurology

## 2019-07-18 NOTE — TELEPHONE ENCOUNTER
Spoke with patient. Need clarification of order. Patient takes 500 mg levetiracetam twice daily. Decrease to 250 twice daily? He experiences symptoms in evening as well.

## 2019-09-26 ENCOUNTER — TELEPHONE (OUTPATIENT)
Dept: NEUROSURGERY | Facility: CLINIC | Age: 59
End: 2019-09-26

## 2019-09-26 DIAGNOSIS — I67.1 DAVF (DURAL ARTERIOVENOUS FISTULA): Primary | ICD-10-CM

## 2019-09-26 NOTE — TELEPHONE ENCOUNTER
REASON FOR CALL: Pt spouse called to schedule an appt. She was not sure if pt should have appt or do an angiogram. Per Dr. Thompson's notes, pt will f/u with cerebral angiogram in 5 months (from 05/28/2019). Please place order and call spouse to help coordinate the appt w/IR, or send to scheduling team.

## 2019-09-26 NOTE — TELEPHONE ENCOUNTER
Called and spoke to wife. Informed her that the order has been placed for cerebral angiogram. Gave her the number for IR at [facility] to call and schedule. Routed message to Dr. Thompson to see if he would like us to schedule a follow up appointment as well.

## 2019-11-21 ENCOUNTER — HOSPITAL ENCOUNTER (OUTPATIENT)
Facility: CLINIC | Age: 59
Discharge: HOME | End: 2019-11-21
Attending: PSYCHIATRY & NEUROLOGY | Admitting: PSYCHIATRY & NEUROLOGY
Payer: COMMERCIAL

## 2019-11-21 ENCOUNTER — APPOINTMENT (OUTPATIENT)
Dept: INTERVENTIONAL RADIOLOGY/VASCULAR | Facility: CLINIC | Age: 59
End: 2019-11-21
Attending: PSYCHIATRY & NEUROLOGY
Payer: COMMERCIAL

## 2019-11-21 VITALS
HEART RATE: 64 BPM | RESPIRATION RATE: 15 BRPM | HEIGHT: 72 IN | SYSTOLIC BLOOD PRESSURE: 131 MMHG | BODY MASS INDEX: 26.13 KG/M2 | OXYGEN SATURATION: 95 % | DIASTOLIC BLOOD PRESSURE: 80 MMHG | WEIGHT: 192.9 LBS | TEMPERATURE: 98.2 F

## 2019-11-21 DIAGNOSIS — I67.1 DAVF (DURAL ARTERIOVENOUS FISTULA): ICD-10-CM

## 2019-11-21 LAB
CREAT SERPL-MCNC: 0.88 MG/DL (ref 0.66–1.25)
GFR SERPL CREATININE-BSD FRML MDRD: >90 ML/MIN/{1.73_M2}
HGB BLD-MCNC: 14.6 G/DL (ref 13.3–17.7)
PLATELET # BLD AUTO: 167 10E9/L (ref 150–450)

## 2019-11-21 PROCEDURE — 36224 PLACE CATH CAROTD ART: CPT

## 2019-11-21 PROCEDURE — 25800030 ZZH RX IP 258 OP 636: Performed by: NEUROLOGICAL SURGERY

## 2019-11-21 PROCEDURE — 27210894 ZZH CATH CR6

## 2019-11-21 PROCEDURE — 25000125 ZZHC RX 250: Performed by: NEUROLOGICAL SURGERY

## 2019-11-21 PROCEDURE — 36415 COLL VENOUS BLD VENIPUNCTURE: CPT

## 2019-11-21 PROCEDURE — 82565 ASSAY OF CREATININE: CPT | Performed by: PSYCHIATRY & NEUROLOGY

## 2019-11-21 PROCEDURE — C1769 GUIDE WIRE: HCPCS

## 2019-11-21 PROCEDURE — 27210732 ZZH ACCESSORY CR1

## 2019-11-21 PROCEDURE — 25800030 ZZH RX IP 258 OP 636: Performed by: PSYCHIATRY & NEUROLOGY

## 2019-11-21 PROCEDURE — 25500064 ZZH RX 255 OP 636: Performed by: NEUROLOGICAL SURGERY

## 2019-11-21 PROCEDURE — 27210805 ZZH SHEATH CR4

## 2019-11-21 PROCEDURE — 40000853 ZZH STATISTIC ANGIOGRAM, STENT, VERTEBRO PLASTY

## 2019-11-21 PROCEDURE — 85018 HEMOGLOBIN: CPT | Performed by: PSYCHIATRY & NEUROLOGY

## 2019-11-21 PROCEDURE — 25000128 H RX IP 250 OP 636: Performed by: NEUROLOGICAL SURGERY

## 2019-11-21 PROCEDURE — 27210886 ZZH ACCESSORY CR5

## 2019-11-21 PROCEDURE — 85049 AUTOMATED PLATELET COUNT: CPT | Performed by: PSYCHIATRY & NEUROLOGY

## 2019-11-21 RX ORDER — NICOTINE POLACRILEX 4 MG
15-30 LOZENGE BUCCAL
Status: DISCONTINUED | OUTPATIENT
Start: 2019-11-21 | End: 2019-11-21 | Stop reason: HOSPADM

## 2019-11-21 RX ORDER — FENTANYL CITRATE 50 UG/ML
INJECTION, SOLUTION INTRAMUSCULAR; INTRAVENOUS
Status: DISCONTINUED
Start: 2019-11-21 | End: 2019-11-21 | Stop reason: HOSPADM

## 2019-11-21 RX ORDER — SODIUM CHLORIDE 9 MG/ML
INJECTION, SOLUTION INTRAVENOUS CONTINUOUS
Status: DISCONTINUED | OUTPATIENT
Start: 2019-11-21 | End: 2019-11-21 | Stop reason: HOSPADM

## 2019-11-21 RX ORDER — ACETAMINOPHEN 500 MG
500 TABLET ORAL EVERY 6 HOURS PRN
Status: DISCONTINUED | OUTPATIENT
Start: 2019-11-21 | End: 2019-11-21 | Stop reason: HOSPADM

## 2019-11-21 RX ORDER — HEPARIN SODIUM 1000 [USP'U]/ML
500-6000 INJECTION, SOLUTION INTRAVENOUS; SUBCUTANEOUS
Status: DISCONTINUED | OUTPATIENT
Start: 2019-11-21 | End: 2019-11-21 | Stop reason: HOSPADM

## 2019-11-21 RX ORDER — DEXTROSE MONOHYDRATE 25 G/50ML
25-50 INJECTION, SOLUTION INTRAVENOUS
Status: DISCONTINUED | OUTPATIENT
Start: 2019-11-21 | End: 2019-11-21 | Stop reason: HOSPADM

## 2019-11-21 RX ORDER — HEPARIN SODIUM 200 [USP'U]/100ML
INJECTION, SOLUTION INTRAVENOUS
Status: DISCONTINUED
Start: 2019-11-21 | End: 2019-11-21 | Stop reason: HOSPADM

## 2019-11-21 RX ORDER — IOPAMIDOL 612 MG/ML
150 INJECTION, SOLUTION INTRAVASCULAR ONCE
Status: COMPLETED | OUTPATIENT
Start: 2019-11-21 | End: 2019-11-21

## 2019-11-21 RX ORDER — FLUMAZENIL 0.1 MG/ML
0.2 INJECTION, SOLUTION INTRAVENOUS
Status: DISCONTINUED | OUTPATIENT
Start: 2019-11-21 | End: 2019-11-21 | Stop reason: HOSPADM

## 2019-11-21 RX ORDER — NALOXONE HYDROCHLORIDE 0.4 MG/ML
.1-.4 INJECTION, SOLUTION INTRAMUSCULAR; INTRAVENOUS; SUBCUTANEOUS
Status: DISCONTINUED | OUTPATIENT
Start: 2019-11-21 | End: 2019-11-21 | Stop reason: HOSPADM

## 2019-11-21 RX ORDER — LIDOCAINE HYDROCHLORIDE 10 MG/ML
INJECTION, SOLUTION INFILTRATION; PERINEURAL
Status: DISCONTINUED
Start: 2019-11-21 | End: 2019-11-21 | Stop reason: HOSPADM

## 2019-11-21 RX ORDER — FENTANYL CITRATE 50 UG/ML
25-50 INJECTION, SOLUTION INTRAMUSCULAR; INTRAVENOUS EVERY 5 MIN PRN
Status: DISCONTINUED | OUTPATIENT
Start: 2019-11-21 | End: 2019-11-21 | Stop reason: HOSPADM

## 2019-11-21 RX ADMIN — SODIUM CHLORIDE: 9 INJECTION, SOLUTION INTRAVENOUS at 07:13

## 2019-11-21 RX ADMIN — HEPARIN SODIUM 10000 UNITS: 10000 INJECTION INTRAVENOUS; SUBCUTANEOUS at 08:24

## 2019-11-21 RX ADMIN — IOPAMIDOL 65 ML: 612 INJECTION, SOLUTION INTRAVENOUS at 09:13

## 2019-11-21 RX ADMIN — MIDAZOLAM HYDROCHLORIDE 1 MG: 1 INJECTION, SOLUTION INTRAMUSCULAR; INTRAVENOUS at 08:23

## 2019-11-21 RX ADMIN — FENTANYL CITRATE 50 MCG: 50 INJECTION, SOLUTION INTRAMUSCULAR; INTRAVENOUS at 09:08

## 2019-11-21 RX ADMIN — FENTANYL CITRATE 50 MCG: 50 INJECTION, SOLUTION INTRAMUSCULAR; INTRAVENOUS at 08:23

## 2019-11-21 RX ADMIN — LIDOCAINE HYDROCHLORIDE 10 ML: 10 INJECTION, SOLUTION INFILTRATION; PERINEURAL at 08:34

## 2019-11-21 ASSESSMENT — MIFFLIN-ST. JEOR: SCORE: 1727.99

## 2019-11-21 NOTE — PROGRESS NOTES
Pt given urinal voided 200cc, HOB raised given courtesy meal.  VS and neuros stable denies pain  Pt took own AM meds

## 2019-11-21 NOTE — SEDATION DOCUMENTATION
Interventional Radiology Intra-procedural Nursing Note    Patient Name: Apolinar Mcgregor  Medical Record Number: [id number]  Today's Date: November 21, 2019    Start Time: 0823  End of procedure time: 0908  Procedure: Diagnostic cerebral angiogram  Report given to: Ivon Isaac RN in [facility]  Time pt departs:  0935  :     Other Notes: Pt transferred to IR table. Prepped and draped appropriately. Monitoring equipment applied. NC applied. No complaints of pain at this time. Timeout recorded.  Right groin CDI, soft. Tyrone pressure held for 20 minutes. 5f sheath. Hemostasis at 0930. VSS.   1mg versed, 100mcg fentanyl. No c/o pain at this time.    SELINA JAMES RN

## 2019-11-21 NOTE — PROCEDURES
[facility]     Endovascular Surgical Neuroradiology Post-Procedure Note    Pre-Procedure Diagnosis:  Left occipital hemorrhage, embolized dural AVF fistula  Post-Procedure Diagnosis:  Same    Procedure(s):   Cerebral angiography RCCA, LICA, LECA, LSCA    Findings:  Some motion artifact, no recurrence seen of dural AVF    Plan:  Per Dr. Thompson    Primary Surgeon: Sidra    Prior to the start of the procedure and with procedural staff participation, I verbally confirmed: the patient s identity using two indicators, relevant allergies, that the procedure was appropriate and matched the consent or emergent situation, and that the correct equipment/implants were available. Immediately prior to starting the procedure I conducted the Time Out with the procedural staff and re-confirmed the patient s name, procedure, and site/side. (The Joint Commission universal protocol was followed.) YES      Anesthesia: Conscious sedation and local anesthesia  Medications:  Versed 1 mg, Fentanyl 100 mcg, 1% lidocaine 10 mL  Puncture site:  Right common femoral artery    Fluoroscopy time (minutes):  11.6   Radiation dose (mGy):    866.58   Contrast amount (mL):  65    Estimated blood loss (mL):  10    Closure:  Manual pressure    Disposition:  To observation suite      Sedation Post-Procedure Summary    Sedatives: See above    Vital signs and pulse oximetry were monitored and remained stable throughout the procedure, and sedation was maintained until the procedure was complete.  The patient was monitored by staff until sedation discharge criteria were met.    Patient tolerance: No immediate complications    Time of sedation in minutes: 45 minutes from beginning to end of physician one to one monitoring.    Vicki Valente MD  Pager:  [phone]

## 2019-11-21 NOTE — PROGRESS NOTES
Care Suites Post-Procedure Note    Procedure: cerebral angiogram  CS arrival time: 0940  Accompanied by: BELEN RN  Concerns/abnormal assessment after procedure: none, neuros intact no change from pre procedure  Plan: discharge to home after bedrest

## 2019-11-21 NOTE — H&P
[facility]     Endovascular Surgical Neuroradiology Pre-Procedure Note      HPI:  Apolinar Mcgregor is a 59 year old man with a prior left occipital dural AVF rupture. This was embolized earlier this year through a transarterial approach and an angiographic cure was obtained. He is doing relatively well. No headaches. Continues to have some cognitive deficits such as short term memory loss. No vision changes. He returns today for follow up cerebral angiogram to evaluate for any recurrence of the dural AVF.    Allergies:  NKDA    Is there a contrast allergy?  No    Medications:    ROS:  Bilateral ear fullness, some nasal drainage and post nasal drip, no cough    PHYSICAL EXAMINATION  Vital Signs:  B/P: 112/83,  T: 98.2,  P: 64,  R: 16    Cardio:  RRR  Pulmonary:  Chest clear  Abdomen: no tenderness or distention    Neurologic  Mental Status:  Fully oriented, blunted affect  Cranial Nerves: All normal  Motor:  No drift, moves all extremities with normal strength  Sensory:  No deficits  Coordination: Normal    Pre-procedure National Institutes of Health Stroke Scale:   0    LABS  (most recent Cr, BUN, GFR, PLT, INR, PTT within the past 7 days):  Recent Labs   Lab 11/21/19  0710   CR 0.88   GFRESTIMATED >90   GFRESTBLACK >90           Platelet Function P2Y12 (PRU):  N/A      ASSESSMENT: 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 Patient understands risks of procedure including stroke, arterial injury, groin hematoma, need for retreatment and agrees to proceed.        PRE-PROCEDURE SEDATION ASSESSMENT     Pre-Procedure Sedation Assessment done at 0800    Expected Level:  Moderate    Indication:  Sedation is required to allow for neurointerventional procedure.    Consent done    PO Intake:  NPO since midnight    ASA Class:  2    Mallampati: II    I have reviewed the lab findings, diagnostic data, medications, and the plan for  sedation. I have determined this patient to be an appropriate candidate for the planned sedation and procedure and have reassessed the patient IMMEDIATELY PRIOR to sedation and procedure.    Vicki Valente MD   Pager: [phone]

## 2019-11-21 NOTE — PROGRESS NOTES
Care Suites Discharge Nursing Note    Education/questions answered: yes, discharge instructions reviewed and given  Patient DC location: to home  Accompanied by: wife Lorraine  ANTHONY discharge time: 1428

## 2019-11-21 NOTE — PROGRESS NOTES
Pt up to BR voided, ambulated in hallway right groin site remains D & I.  No difficulty ambulating denies dizziness

## 2019-11-21 NOTE — DISCHARGE INSTRUCTIONS
Cerebral Angiogram Discharge Instructions - Femoral     After you go home:      Have an adult stay with you until tomorrow.    Drink extra fluids for 2 days.    You may resume your normal diet.    No smoking       For 24 hours - due to the sedation you received:    Relax and take it easy.    Do NOT make any important or legal decisions.    Do NOT drive or operate machines at home or at work.    Do NOT drink alcohol.    Care of Groin Puncture Site:      For the first 24 hrs - check the puncture site every 1-2 hours while awake.    For 2 days, when you cough, sneeze, laugh or move your bowels, hold your hand over the puncture site and press firmly.    Remove the bandaid after 24 hours. If there is minor oozing, apply another bandaid and remove it after 12 hours.    It is normal to have a small bruise or pea size lump at the site.    You may shower tomorrow. Do NOT take a bath, or use a hot tub or pool for at least 3 days. Do NOT scrub the site. Do not use lotion or powder near the puncture site.     Activity:            For 2 days:    No stooping or squatting    Do NOT do any heavy activity such as exercise, lifting, or straining.     No housework, yard work or any activity that make you sweat    Do NOT lift more than 10 pounds    Bleeding:      If you start bleeding from the site in your groin, lie down flat and press firmly on/above the site for 10 minutes.     Once bleeding stops, lay flat for 2 hours.     Call Dr. Valente as soon as you can.       Call 911 right away if you have heavy bleeding or bleeding that does not stop.      Medicines:      If you are on Metformin (Glucophage) and your GFR (kidney function level) is >30, you may continue taking your Metformin.  .    If you have stopped any medicines, check with your provider about when to restart them.        Follow Up Appointments:      Follow up with Dr. Thompson  as directed.    Call the clinic if:      You have increased pain or a large or growing hard lump  around the site.    The site is red, swollen, hot or tender.    Blood or fluid is draining from the site.    You have chills or a fever greater than 101 F (38 C).    Your leg feels numb, cool or changes color.    You have hives, a rash or unusual itching.    New pain in the back or belly that you cannot control with Tylenol.    You experience changes in your vision, hearing, balance, coordination, speech, thinking or memory.    You experience weakness in one or more extremity.    Any questions or concerns.    If you have questions or your original symptoms do not improve, call:         Dr. Valente at [phone]

## 2019-11-21 NOTE — PROGRESS NOTES
Care Suites Admission Nursing Note    Reason for admission: cerebral angiogran  CS arrival time: 0630  Accompanied by: Lorraine wife  Name/phone of DC : [phone]    Medications held: none  Consent signed: yes per MD  Abnormal assessment/labs: none  If abnormal, provider notified: na  Education/questions answered: yes  Plan: discharge to home

## 2019-12-09 NOTE — OP NOTE
Procedure Date: 11/21/2019      PREOPERATIVE DIAGNOSES:   1.  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      POSTOPERATIVE DIAGNOSES:   1.  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   2.  No residual or recurrent dural arteriovenous fistula.      TITLE OF PROCEDURES:   1.  Catheterization of right common femoral artery.   2.  Catheterization of right common carotid artery.   3.  Catheterization of left internal carotid artery.   4.  Catheterization of left external carotid artery.   5.  Catheterization of left subclavian artery.   6.  Multiview diagnostic cerebral angiography and interpretation of images.      ANESTHESIA:  Conscious sedation and local anesthesia were provided.  The patient received 1 mg IV Versed, 100 mcg IV fentanyl, and 10 mL of 1% lidocaine locally.  All medications were administered by nursing staff under my supervision.  Nursing staff monitored the patient's vital signs and sedation level throughout the procedure.      FLUOROSCOPY TIME:  11.6 minutes.      FLUOROSCOPY DOSE:  866.58 mGy.      CONTRAST:  65 mL of Isovue.      ESTIMATED BLOOD LOSS:  10 mL.      INDICATIONS:  Mr. Mcgregor is a 59-year-old man who presented with a left occipital lobe hemorrhage earlier this year.  He underwent a transarterial embolization through the left middle meningeal and occipital arteries to achieve an angiographic cure.  He is doing relatively well.  He denies any headaches or tinnitus.  He continues to have some cognitive deficits, including short-term memory loss secondary to the hemorrhage.  He presents for a followup cerebral angiogram to check for any recurrence of the embolized fistula.      DESCRIPTION OF PROCEDURE:  The patient was placed in the supine position on the angiography table.  His right groin was prepared and draped in the usual sterile fashion.  A timeout was performed.  Upon administration of conscious  sedation, the right groin was infiltrated with 1% lidocaine.  Right common femoral artery was palpated and a 5-Samoan sheath was placed using modified Seldinger technique.  The sheath was connected to a continuous heparinized saline flush.      A 5-Samoan TapPress 2 catheter was advanced over a 0.035-inch Glidewire.  The right common carotid artery was catheterized and angiography over the cranium was performed.  Next, the left common carotid artery was catheterized.  Under roadmap guidance, the left external and internal carotid arteries were catheterized selectively over the Glidewire.  Angiography centered over the cranium was performed for each of these injections.  The catheter was then advanced into the left subclavian artery.  Because the origin of the left vertebral artery was tortuous, we decided to do cranial runs through the subclavian artery injection.  At the end of the procedure, the catheter and sheath were removed and hemostasis was achieved with manual pressure.  The patient was transported back to the Observation Suite without incident.  I was present for the entire procedure, including the key portions.      INTERPRETATION OF IMAGES:   1.  Right common femoral artery runoff:  Shows insertion of the sheath about 1 cm above the common femoral artery bifurcation.  There is some mild tortuosity of the iliofemoral arterial system.  There is no contrast extravasation, dissection, or stenosis.   2.  Right common carotid artery injection, AP and lateral views intracranially:  These views show normal filling of the external carotid branches.  The right occipital artery is relatively diminutive.  There is an Devonte embolic cast over the left temporo-occipital convexity.  The right internal carotid artery fills normally.  The intracranial segments appear normal.  The ophthalmic artery is the first intradural branch.  The posterior communicating artery is not seen on this injection.  The ICA bifurcates into the  anterior middle cerebral arteries.  Both of these territories fill normally.  There is no intracranial arterial stenosis, aneurysm, or vascular malformation.  There is some motion artifact degrading the images.  The capillary and venous phases appear normal.   3.  Left subclavian artery injection, AP, lateral, and oblique views intracranially:  These views show that the PICA has an extradural origin.  Once again, there is motion artifact affecting image quality.  The basilar artery fills normally.  The basilar artery continues as the right posterior cerebral artery.  The left posterior cerebral artery does not fill well on these images.  There is no evidence of any vascular malformation or aneurysm.  The right transverse-sigmoid sinus system fills normally.  The left transverse sinus fills proximally only.  There is no filling seen distal to this.  There is no evidence of a dural arteriovenous fistula on these images.   4.  Left common carotid artery injection, AP and lateral views, in the cervical region:  This view shows the common carotid artery bifurcation lies along the inferior edge of the C2 vertebral body.  There is no stenosis at the origin of the ICA.   5.  Left external carotid artery injection, AP, lateral and oblique views intracranially:  These views show normal filling of the external carotid branches.  The parietal branch of the middle meningeal artery fills distally, but no longer contributes to any early venous filling.  Similarly, the occipital artery fills normally, but there is no early venous filling.  There is no evidence of vascular malformation on these runs.  Once again, the Devonte embolic cast is seen in the retroauricular region along the temporo-occipital convexity.   6.  Left internal carotid artery injection, AP and lateral views intracranially:  These views show normal filling of the intracranial segments of the internal carotid artery.  The ophthalmic artery is the first intradural  branch.  The posterior communicating artery is filling, but diminutive.  The ICA bifurcates into the anterior middle cerebral arteries.  There is flash filling of the contralateral anterior cerebral artery territory.  Both KINGSLEY and MCA territories fill normally.  There is no aneurysm or vascular malformation seen.  There is motion artifact limiting image quality.  The left transverse sigmoid sinus system is filling on the venous phase, but this system is nondominant.  The right transverse sigmoid system is much larger in caliber.      OVERALL IMPRESSION:  This follow up cerebral angiogram shows no recurrent or residual left occipital dural arteriovenous fistula that was previously treated with transarterial embolization.         QUE KIMBROUGH MD             D: 2019   T: 2019   MT: LUCY      Name:     PEG RIVERA   MRN:      [id number]        Account:        [id number]   :      1960           Procedure Date: 2019      Document: [id number]

## 2020-03-25 NOTE — ADDENDUM NOTE
Encounter addended by: Alpers, Allison E, SLP on: 3/25/2020 2:05 PM   Actions taken: Episode resolved

## 2022-05-25 NOTE — TELEPHONE ENCOUNTER
Gave her a new lab slip to recheck her sodium level  I can follow up with him after cerebral angiogram regarding AED taper/discontinuation. Thank you.    J

## 2023-11-09 ENCOUNTER — APPOINTMENT (OUTPATIENT)
Dept: CT IMAGING | Facility: HOSPITAL | Age: 63
End: 2023-11-09
Attending: EMERGENCY MEDICINE
Payer: COMMERCIAL

## 2023-11-09 ENCOUNTER — HOSPITAL ENCOUNTER (EMERGENCY)
Facility: HOSPITAL | Age: 63
Discharge: HOME | End: 2023-11-10
Attending: EMERGENCY MEDICINE | Admitting: EMERGENCY MEDICINE
Payer: COMMERCIAL

## 2023-11-09 DIAGNOSIS — I95.1 ORTHOSTATIC HYPOTENSION: ICD-10-CM

## 2023-11-09 DIAGNOSIS — R42 LIGHTHEADEDNESS: ICD-10-CM

## 2023-11-09 LAB
ANION GAP SERPL CALCULATED.3IONS-SCNC: 9 MMOL/L (ref 7–15)
APTT PPP: 28 SECONDS (ref 22–38)
BASOPHILS # BLD AUTO: 0 10E3/UL (ref 0–0.2)
BASOPHILS NFR BLD AUTO: 1 %
BUN SERPL-MCNC: 20.6 MG/DL (ref 8–23)
CALCIUM SERPL-MCNC: 9.8 MG/DL (ref 8.8–10.2)
CHLORIDE SERPL-SCNC: 107 MMOL/L (ref 98–107)
CREAT BLD-MCNC: 1 MG/DL (ref 0.7–1.3)
CREAT SERPL-MCNC: 1 MG/DL (ref 0.67–1.17)
DEPRECATED HCO3 PLAS-SCNC: 27 MMOL/L (ref 22–29)
EGFRCR SERPLBLD CKD-EPI 2021: 85 ML/MIN/1.73M2
EGFRCR SERPLBLD CKD-EPI 2021: >60 ML/MIN/1.73M2
EOSINOPHIL # BLD AUTO: 0.3 10E3/UL (ref 0–0.7)
EOSINOPHIL NFR BLD AUTO: 5 %
ERYTHROCYTE [DISTWIDTH] IN BLOOD BY AUTOMATED COUNT: 13.2 % (ref 10–15)
GLUCOSE BLDC GLUCOMTR-MCNC: 137 MG/DL (ref 70–99)
GLUCOSE SERPL-MCNC: 141 MG/DL (ref 70–99)
HCT VFR BLD AUTO: 44 % (ref 40–53)
HGB BLD-MCNC: 14.7 G/DL (ref 13.3–17.7)
IMM GRANULOCYTES # BLD: 0 10E3/UL
IMM GRANULOCYTES NFR BLD: 1 %
INR PPP: 1.03 (ref 0.85–1.15)
LACTATE SERPL-SCNC: 0.8 MMOL/L (ref 0.7–2)
LYMPHOCYTES # BLD AUTO: 1.5 10E3/UL (ref 0.8–5.3)
LYMPHOCYTES NFR BLD AUTO: 24 %
MCH RBC QN AUTO: 30.6 PG (ref 26.5–33)
MCHC RBC AUTO-ENTMCNC: 33.4 G/DL (ref 31.5–36.5)
MCV RBC AUTO: 92 FL (ref 78–100)
MONOCYTES # BLD AUTO: 0.5 10E3/UL (ref 0–1.3)
MONOCYTES NFR BLD AUTO: 9 %
NEUTROPHILS # BLD AUTO: 3.6 10E3/UL (ref 1.6–8.3)
NEUTROPHILS NFR BLD AUTO: 60 %
NRBC # BLD AUTO: 0 10E3/UL
NRBC BLD AUTO-RTO: 0 /100
PLATELET # BLD AUTO: 154 10E3/UL (ref 150–450)
POTASSIUM SERPL-SCNC: 4.3 MMOL/L (ref 3.4–5.3)
RBC # BLD AUTO: 4.81 10E6/UL (ref 4.4–5.9)
SODIUM SERPL-SCNC: 143 MMOL/L (ref 135–145)
TROPONIN T SERPL HS-MCNC: 8 NG/L
TROPONIN T SERPL HS-MCNC: 9 NG/L
WBC # BLD AUTO: 6 10E3/UL (ref 4–11)

## 2023-11-09 PROCEDURE — 82962 GLUCOSE BLOOD TEST: CPT

## 2023-11-09 PROCEDURE — 82565 ASSAY OF CREATININE: CPT | Mod: 91

## 2023-11-09 PROCEDURE — 85730 THROMBOPLASTIN TIME PARTIAL: CPT | Performed by: EMERGENCY MEDICINE

## 2023-11-09 PROCEDURE — 250N000011 HC RX IP 250 OP 636: Performed by: EMERGENCY MEDICINE

## 2023-11-09 PROCEDURE — 80048 BASIC METABOLIC PNL TOTAL CA: CPT | Performed by: EMERGENCY MEDICINE

## 2023-11-09 PROCEDURE — 80235 DRUG ASSAY LACOSAMIDE: CPT | Performed by: EMERGENCY MEDICINE

## 2023-11-09 PROCEDURE — 70498 CT ANGIOGRAPHY NECK: CPT

## 2023-11-09 PROCEDURE — 250N000013 HC RX MED GY IP 250 OP 250 PS 637: Performed by: EMERGENCY MEDICINE

## 2023-11-09 PROCEDURE — 99285 EMERGENCY DEPT VISIT HI MDM: CPT | Mod: 25

## 2023-11-09 PROCEDURE — 85025 COMPLETE CBC W/AUTO DIFF WBC: CPT | Performed by: EMERGENCY MEDICINE

## 2023-11-09 PROCEDURE — 36415 COLL VENOUS BLD VENIPUNCTURE: CPT | Performed by: EMERGENCY MEDICINE

## 2023-11-09 PROCEDURE — 93005 ELECTROCARDIOGRAM TRACING: CPT | Performed by: EMERGENCY MEDICINE

## 2023-11-09 PROCEDURE — 70496 CT ANGIOGRAPHY HEAD: CPT

## 2023-11-09 PROCEDURE — 83605 ASSAY OF LACTIC ACID: CPT | Performed by: EMERGENCY MEDICINE

## 2023-11-09 PROCEDURE — 85610 PROTHROMBIN TIME: CPT | Performed by: EMERGENCY MEDICINE

## 2023-11-09 PROCEDURE — 84484 ASSAY OF TROPONIN QUANT: CPT | Mod: 91 | Performed by: EMERGENCY MEDICINE

## 2023-11-09 RX ORDER — LABETALOL HYDROCHLORIDE 5 MG/ML
10 INJECTION, SOLUTION INTRAVENOUS EVERY 10 MIN PRN
Status: DISCONTINUED | OUTPATIENT
Start: 2023-11-09 | End: 2023-11-10 | Stop reason: HOSPADM

## 2023-11-09 RX ORDER — LACOSAMIDE 50 MG/1
200 TABLET ORAL ONCE
Status: COMPLETED | OUTPATIENT
Start: 2023-11-09 | End: 2023-11-09

## 2023-11-09 RX ORDER — IOPAMIDOL 755 MG/ML
75 INJECTION, SOLUTION INTRAVASCULAR ONCE
Status: COMPLETED | OUTPATIENT
Start: 2023-11-09 | End: 2023-11-09

## 2023-11-09 RX ORDER — HYDRALAZINE HYDROCHLORIDE 20 MG/ML
10 INJECTION INTRAMUSCULAR; INTRAVENOUS EVERY 10 MIN PRN
Status: DISCONTINUED | OUTPATIENT
Start: 2023-11-09 | End: 2023-11-10 | Stop reason: HOSPADM

## 2023-11-09 RX ORDER — SODIUM CHLORIDE 9 MG/ML
INJECTION, SOLUTION INTRAVENOUS CONTINUOUS PRN
Status: DISCONTINUED | OUTPATIENT
Start: 2023-11-09 | End: 2023-11-10 | Stop reason: HOSPADM

## 2023-11-09 RX ADMIN — LACOSAMIDE 200 MG: 50 TABLET, FILM COATED ORAL at 22:58

## 2023-11-09 RX ADMIN — IOPAMIDOL 75 ML: 755 INJECTION, SOLUTION INTRAVENOUS at 21:47

## 2023-11-09 ASSESSMENT — ENCOUNTER SYMPTOMS
NECK PAIN: 0
NAUSEA: 0
LIGHT-HEADEDNESS: 1
PALPITATIONS: 0

## 2023-11-09 ASSESSMENT — ACTIVITIES OF DAILY LIVING (ADL)
ADLS_ACUITY_SCORE: 35
ADLS_ACUITY_SCORE: 35

## 2023-11-10 VITALS
HEART RATE: 65 BPM | WEIGHT: 200 LBS | OXYGEN SATURATION: 97 % | DIASTOLIC BLOOD PRESSURE: 69 MMHG | RESPIRATION RATE: 16 BRPM | HEIGHT: 72 IN | SYSTOLIC BLOOD PRESSURE: 140 MMHG | TEMPERATURE: 98.1 F | BODY MASS INDEX: 27.09 KG/M2

## 2023-11-10 PROCEDURE — 258N000003 HC RX IP 258 OP 636: Performed by: EMERGENCY MEDICINE

## 2023-11-10 RX ADMIN — SODIUM CHLORIDE 500 ML: 9 INJECTION, SOLUTION INTRAVENOUS at 00:30

## 2023-11-10 NOTE — ED PROVIDER NOTES
NAME: Apolinar Mcgregor  AGE: 63 year old male  YOB: 1960  MRN: [id number]  EVALUATION DATE & TIME: 2023  8:49 PM    PCP: Luis Fernando Rodriguez    ED PROVIDER: Uche Hanson M.D.      Chief Complaint   Patient presents with    Dizziness     FINAL IMPRESSION:  1. Lightheadedness    2. Orthostatic hypotension      MEDICAL DECISION MAKIN:54 PM Patient was clinically assessed and consented to treatment. After assessment, medical decision making and workup were discussed with the patient. The patient was agreeable to plan for testing, workup, and treatment.  Pertinent Labs & Imaging studies reviewed. (See chart for details)  10:08 PM I checked on the patient and discussed work-up findings thus far.  11:16 PM I checked on the patient. He reports feeling light-headed when he raises his head up.  12:23 AM I checked on the patient and he states that he is back to normal and would like to go home. Patient to be discharged by ED RN.        Medical Decision Making    History:  Supplemental history from: Documented in chart, if applicable  External Record(s) reviewed: Urgent Care visit on 2023 and Family Medicine Office visit on 2023    Work Up:  Chart documentation includes differential considered and any EKGs or imaging independently interpreted by provider, where specified.  In additional to work up documented, I considered the following work up: Documented in chart, if applicable.    External consultation:  Discussion of management with another provider: Documented in chart, if applicable    Complicating factors:  Care impacted by chronic illness: Hypertension and diabetes  Care affected by social determinants of health: Access to Medical Care    Disposition considerations: Discharge. No recommendations on prescription strength medication(s). I considered admission, but discharged the patient after share decision making conversation.    Apolinar Mcgregor is a 63 year old male who presents  with dizziness or lightheadedness.   Differential diagnosis includes but not limited to vertigo, CVA, TIA, intracranial hemorrhage, acute coronary syndrome, dehydration, hypovolemia, encephalopathy.  Patient with history of intracranial bleed in 2019.  Patient also had AVM malformation that was treated with endovascular treatment later that year.  Patient with similar symptoms of slight headache and then lightheaded feeling more specifically.  He does not describe vertigo or spinning and no nausea.  He describes a feeling like he is going to pass out which would fit with lightheadedness rather than vertigo.  Patient unsure exact onset but possibly 4-5.5 hours ago.  At this time that would be out of the window for tenecteplase and given his history of intracranial bleed would not be a candidate.  Patient plan for CT scan with CTA.  Labs obtained and EKG.  EKG did not show any ischemia.  Initial labs showed negative troponin, unremarkable CBC, normal metabolic panel and i-STAT creatinine was done in order to expedite CT.  Noncontrast CT did not show any bleed and CTA the showed past areas of bleed but no acute findings and also showed past embolization.  Initially patient reported that endovascular treatment but unclear whether it was coiling or possibly chemical embolization.  Given patient's chemical immobilization I did consider MRI however patient without any vertigo and negative CT at this time we discussed observation.  Patient reports that when he would sit up he felt lightheaded but blood pressure still elevated and patient thought this might be related.  He did not have any change in blood pressure from laying down to sitting and heart rate remains the same.  This time patient observed in blood pressure continually coming down patient still comfortable.  He still had some slight lightheadedness but was less so during course of observation.  We discussed possible admission and observing him or MRI but after  observation and repeat troponin he reported symptoms were gone.  I did at that point proceed from sitting and lying down orthostatics to standing.  With the standing he did report slight lightheadedness as he stood but then it quickly went away.  With standing his blood pressure did drop to the 1 teens which he reports is his normal blood pressure.  Given this drop I do feel hypovolemia could be contributory.  Patient given small bolus of fluid so he did not wish to much as he was concerned about the earlier elevation of blood pressure.  He had no drop in blood pressure below the 1 teens which is his typical and received fluids which did show a increase back to the 140s of blood pressure.  After fluids he stood without any symptoms and I feel this is likely orthostatic hypotension contributing to his symptoms.  We discussed continued plan for observation however patient now without symptoms wishes to go home.  I did recommend follow-up with his primary doctor as well with his neurologist for continued check.  Patient did of note he received his Vimpat while in the ER and a level was sent but did not return.    0 minutes of critical care time    MEDICATIONS GIVEN IN THE EMERGENCY:  Medications   iopamidol (ISOVUE-370) solution 75 mL (75 mLs Intravenous $Given 11/9/23 2147)   lacosamide (VIMPAT) tablet 200 mg (200 mg Oral $Given 11/9/23 0288)   sodium chloride 0.9% BOLUS 500 mL (0 mLs Intravenous Stopped 11/10/23 0055)     NEW PRESCRIPTIONS STARTED AT TODAY'S ER VISIT:  Discharge Medication List as of 11/10/2023 12:55 AM          =================================================================    HPI    Patient information was obtained from: The patient    Use of : N/A    Apolinar Mcgregor is a 63 year old male with a past medical history of hypertension, intracranial hemorrhage, diabetes who presents to the ER via walk-in for dizziness.    The patient was driving back from [place], [place] around 5:30 PM  "and was experiencing light-headedness and double vision. He further describes his double vision as if he sees two objects when it comes closer to him. He is finding himself to concentrate more when doing things and just feels \"off\". He notes his headache has improved slightly since initial onset. He states his symptoms feels similar to a past stroke. He is currently taking hypertensive medications (Amlodipine) and states his blood pressure is typically low. He denies any known allergies to any medication.    Otherwise, the patient denied having neck pain, palpitations, nausea, and any other medical complaints or concerns at this time.    Per chart review, the patient was seen at Urgent Care on 04/17/2023 for an evaluation of lightheadedness and dizziness. He developed a sudden onset of lightheadedness, and dizziness with a mild headache. Glucose was WNL (102). ECG was normal. He was discussed to be further evaluated at [facility], but he left without being seen.    He saw his primary care on 05/24/2023 and there were notes that he had difficulty concentrating and felt foggy on 03/14/22, thus his Vimpat was decreased to 200mg. Recommended the idea of treatment for SHABANA with CPAP and follow-up with [facility]. Also suggested lowering amlodipine 5mg tablet to just half of a tablet a day.    REVIEW OF SYSTEMS   Review of Systems   Eyes:  Positive for visual disturbance (double vision).   Cardiovascular:  Negative for palpitations.   Gastrointestinal:  Negative for nausea.   Musculoskeletal:  Negative for neck pain.   Neurological:  Positive for light-headedness.   All other systems reviewed and are negative.     PAST MEDICAL HISTORY:  Past Medical History:   Diagnosis Date    Gastro-oesophageal reflux disease     Hypertension     Urinary retention      PAST SURGICAL HISTORY:  Past Surgical History:   Procedure Laterality Date    CHOLECYSTECTOMY  2000    IR CAROTID CEREBRAL ANGIOGRAM BILATERAL  4/24/2019    IR " CAROTID CEREBRAL ANGIOGRAM BILATERAL  11/21/2019    IR CAROTID CEREBRAL ANGIOGRAM LEFT  4/26/2019    URETHROPLASTY WITH BUCCAL GRAFT  6/12/2013    Procedure: URETHROPLASTY WITH BUCCAL GRAFT;  Anterior Urethroplasty with Buccal Graft ;  Surgeon: Dmitry Moore MD;  Location:  OR     CURRENT MEDICATIONS:    No current facility-administered medications for this encounter.    Current Outpatient Medications:     acetaminophen (TYLENOL) 325 MG tablet, Take 2 tablets (650 mg) by mouth every 6 hours as needed for mild pain or fever, Disp: , Rfl:     amLODIPine (NORVASC) 5 MG tablet, Take 1 tablet (5 mg) by mouth daily, Disp: 30 tablet, Rfl: 0    artificial saliva (BIOTENE DRY MOUTHWASH) LIQD liquid, Swish and spit 10 mLs in mouth 4 times daily as needed for dry mouth (Patient not taking: Reported on 5/28/2019), Disp: , Rfl:     levETIRAcetam (KEPPRA) 500 MG tablet, Take 1 tablet (500 mg) by mouth daily Remain on levetiracetam until you have angiogram with Dr. Thompson in October., Disp: 30 tablet, Rfl: 0    metFORMIN (GLUCOPHAGE-XR) 500 MG 24 hr tablet, Take 500-1,000 mg by mouth daily (with breakfast), Disp: , Rfl:     Omeprazole (PRILOSEC PO), Take 20 mg by mouth every morning., Disp: , Rfl:     polyethylene glycol (MIRALAX/GLYCOLAX) packet, Take 17 g by mouth daily as needed for constipation, Disp: , Rfl:     tamsulosin (FLOMAX) 0.4 MG capsule, Take 1 capsule (0.4 mg) by mouth daily, Disp: 30 capsule, Rfl: 0    ALLERGIES:  No Known Allergies    FAMILY HISTORY:  No family history on file.    SOCIAL HISTORY:   Social History     Socioeconomic History    Marital status:    Tobacco Use    Smoking status: Former    Smokeless tobacco: Never   Substance and Sexual Activity    Alcohol use: Not Currently     Comment: occasional    Drug use: No    Sexual activity: Never     PHYSICAL EXAM:    Vitals: BP (!) 140/69   Pulse 65   Temp 98.1  F (36.7  C) (Oral)   Resp 16   Ht 1.829 m (6')   Wt 90.7 kg (200 lb)   SpO2  97%   BMI 27.12 kg/m     Physical Exam  Vitals and nursing note reviewed.   Constitutional:       General: He is not in acute distress.     Appearance: Normal appearance. He is normal weight. He is not ill-appearing, toxic-appearing or diaphoretic.   HENT:      Head: Normocephalic and atraumatic.   Eyes:      General: No visual field deficit.     Extraocular Movements: Extraocular movements intact.      Pupils: Pupils are equal, round, and reactive to light.   Cardiovascular:      Rate and Rhythm: Normal rate and regular rhythm.      Heart sounds: Normal heart sounds.   Pulmonary:      Effort: Pulmonary effort is normal. No respiratory distress.      Breath sounds: Normal breath sounds.   Musculoskeletal:      Cervical back: Normal range of motion and neck supple.   Neurological:      General: No focal deficit present.      Mental Status: He is alert and oriented to person, place, and time.      Cranial Nerves: No cranial nerve deficit, dysarthria or facial asymmetry.      Sensory: Sensation is intact.      Motor: No weakness, tremor, abnormal muscle tone, seizure activity or pronator drift.      Coordination: Coordination is intact. Coordination normal. Finger-Nose-Finger Test normal.   Psychiatric:         Mood and Affect: Mood normal.         Behavior: Behavior normal.        LAB:  All pertinent labs reviewed and interpreted.  Labs Ordered and Resulted from Time of ED Arrival to Time of ED Departure   BASIC METABOLIC PANEL - Abnormal       Result Value    Sodium 143      Potassium 4.3      Chloride 107      Carbon Dioxide (CO2) 27      Anion Gap 9      Urea Nitrogen 20.6      Creatinine 1.00      GFR Estimate 85      Calcium 9.8      Glucose 141 (*)    GLUCOSE BY METER - Abnormal    GLUCOSE BY METER POCT 137 (*)    INR - Normal    INR 1.03     PARTIAL THROMBOPLASTIN TIME - Normal    aPTT 28     TROPONIN T, HIGH SENSITIVITY - Normal    Troponin T, High Sensitivity 8     ISTAT CREATININE POCT - Normal     Creatinine POCT 1.0      GFR, ESTIMATED POCT >60     TROPONIN T, HIGH SENSITIVITY - Normal    Troponin T, High Sensitivity 9     LACTIC ACID WHOLE BLOOD - Normal    Lactic Acid 0.8     CBC WITH PLATELETS AND DIFFERENTIAL    WBC Count 6.0      RBC Count 4.81      Hemoglobin 14.7      Hematocrit 44.0      MCV 92      MCH 30.6      MCHC 33.4      RDW 13.2      Platelet Count 154      % Neutrophils 60      % Lymphocytes 24      % Monocytes 9      % Eosinophils 5      % Basophils 1      % Immature Granulocytes 1      NRBCs per 100 WBC 0      Absolute Neutrophils 3.6      Absolute Lymphocytes 1.5      Absolute Monocytes 0.5      Absolute Eosinophils 0.3      Absolute Basophils 0.0      Absolute Immature Granulocytes 0.0      Absolute NRBCs 0.0     LACOSAMIDE LEVEL     RADIOLOGY:  CTA Head Neck with Contrast   Final Result   IMPRESSION:    HEAD CT:   1.  No discrete mass lesion, hemorrhage or focal area suggestive of acute infarct by CT.   2.  Minimal age related changes.   3.  Stable encephalomalacia posterior medial left occipital to left parietal lobes with embolization material visualized.   4.  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      HEAD CTA:    1.  No discrete vessel occlusion, significant stenosis, aneurysm or high flow vascular malformation involving the arteries of the [place].   2.  No discrete contrast visualized within left transverse sinus, left sigmoid sinus and left jugular bulb consistent with prior embolization.      NECK CTA:   1.  Normal configuration of the great vessels off the aortic arch with no significant stenosis of their origins.   2.  No significant stenosis or irregularity involving the arteries of the neck by NASCET criteria.   3.  No radiographic evidence of dissection.        EKG:   Performed at: 11/09/2023 at 21:50  Impression: Sinus rhythm with moderate LVH criteria, no signs of acute ST elevation ischemia, no irregular rhythm or  signs of atrial fibrillation.  Compared to prior EKG from April 22, 2019 no longer tachycardic and improvement in rate related amplitude  Rate: 67 bpm  Rhythm: Sinus rhythm  QRS Interval: 102 ms  QTc Interval: 424 ms  Comparison: ECG of 04/22/2019 at 10:54  I have independently reviewed and interpreted the EKG(s) documented above.     PROCEDURES:   Procedures     I, Fredo Shaw, am serving as a scribe to document services personally performed by Dr. Uche Hanson  based on my observation and the provider's statements to me. I, Uche Hanson MD attest that Fredo Shaw is acting in a scribe capacity, has observed my performance of the services and has documented them in accordance with my direction.    Uche Hanson M.D.  Emergency Medicine  [facility] Emergency Department       Uche Hanson MD  11/10/23 9647

## 2023-11-10 NOTE — ED TRIAGE NOTES
Pt arrives via EMS. He complains of feeling lightheaded and dizzy that started around 7 pm. History of stroke in 2019.

## 2023-11-10 NOTE — ED NOTES
Bed: JNED26  Expected date: 11/9/23  Expected time:   Means of arrival:   Comments:  [facility]   67 male  Pt is concerned he is having a stroke. [place] stroke scale per EMS is negative. C/O feeling dizzy. Hypertensive.

## 2023-11-12 LAB
ATRIAL RATE - MUSE: 67 BPM
DIASTOLIC BLOOD PRESSURE - MUSE: NORMAL MMHG
INTERPRETATION ECG - MUSE: NORMAL
LACOSAMIDE SERPL-MCNC: 8.7 UG/ML
P AXIS - MUSE: 53 DEGREES
PR INTERVAL - MUSE: 178 MS
QRS DURATION - MUSE: 102 MS
QT - MUSE: 402 MS
QTC - MUSE: 424 MS
R AXIS - MUSE: -16 DEGREES
SYSTOLIC BLOOD PRESSURE - MUSE: NORMAL MMHG
T AXIS - MUSE: 41 DEGREES
VENTRICULAR RATE- MUSE: 67 BPM

## (undated) RX ORDER — FENTANYL CITRATE 50 UG/ML
INJECTION, SOLUTION INTRAMUSCULAR; INTRAVENOUS
Status: DISPENSED
Start: 2019-04-26

## (undated) RX ORDER — LABETALOL 20 MG/4 ML (5 MG/ML) INTRAVENOUS SYRINGE
Status: DISPENSED
Start: 2019-04-26

## (undated) RX ORDER — HYDRALAZINE HYDROCHLORIDE 20 MG/ML
INJECTION INTRAMUSCULAR; INTRAVENOUS
Status: DISPENSED
Start: 2019-04-26